# Patient Record
Sex: FEMALE | Race: WHITE | Employment: UNEMPLOYED | URBAN - METROPOLITAN AREA
[De-identification: names, ages, dates, MRNs, and addresses within clinical notes are randomized per-mention and may not be internally consistent; named-entity substitution may affect disease eponyms.]

---

## 2017-03-16 ENCOUNTER — TELEPHONE (OUTPATIENT)
Dept: FAMILY MEDICINE CLINIC | Age: 57
End: 2017-03-16

## 2017-08-18 RX ORDER — VALSARTAN 320 MG/1
TABLET ORAL
Qty: 90 TABLET | Refills: 0 | Status: SHIPPED | OUTPATIENT
Start: 2017-08-18 | End: 2017-10-26 | Stop reason: SDUPTHER

## 2017-08-18 RX ORDER — ROSUVASTATIN CALCIUM 10 MG/1
TABLET, COATED ORAL
Qty: 45 TABLET | Refills: 0 | Status: SHIPPED | OUTPATIENT
Start: 2017-08-18 | End: 2017-10-26 | Stop reason: SDUPTHER

## 2017-10-13 ENCOUNTER — TELEPHONE (OUTPATIENT)
Dept: FAMILY MEDICINE CLINIC | Age: 57
End: 2017-10-13

## 2017-10-25 NOTE — TELEPHONE ENCOUNTER
Patient called to request a 90 day prescriptions for    metFORMIN (GLUCOPHAGE) 1000 MG tablet [[id number]]    rosuvastatin (CRESTOR) 10 MG tablet [[id number]]     valsartan (DIOVAN) 320 MG tablet [[id number]]     Insulin Pen Needle (B-D ULTRAFINE III SHORT PEN) 31G X 8 MM MISC [[id number]]     She needs this sent in to [address] as soon as possible. She had an appointment scheduled with Dr. Steve Cope on 10/12/17 which due to his schedule change was canceled and rescheduled for 11/15/17. She will be out of these medications soon because of that.

## 2017-10-26 RX ORDER — ROSUVASTATIN CALCIUM 10 MG/1
TABLET, COATED ORAL
Qty: 45 TABLET | Refills: 0 | Status: SHIPPED | OUTPATIENT
Start: 2017-10-26 | End: 2018-01-24 | Stop reason: SDUPTHER

## 2017-10-26 RX ORDER — VALSARTAN 320 MG/1
TABLET ORAL
Qty: 90 TABLET | Refills: 0 | Status: SHIPPED | OUTPATIENT
Start: 2017-10-26 | End: 2018-01-24 | Stop reason: SDUPTHER

## 2017-12-26 ENCOUNTER — TELEPHONE (OUTPATIENT)
Dept: FAMILY MEDICINE CLINIC | Age: 57
End: 2017-12-26

## 2017-12-26 NOTE — TELEPHONE ENCOUNTER
Patient is needing to know if we have any samples of Lantus Solostar to hold her over for three weeks because insurance will not pay again until January 2018. Please call [phone] to advise. Can speak to patient's mother, Yazmin Walker. Patient is at work and is unable to speak on the phone.

## 2018-02-17 ENCOUNTER — HOSPITAL ENCOUNTER (OUTPATIENT)
Dept: MAMMOGRAPHY | Age: 58
Discharge: HOME | End: 2018-02-17
Attending: FAMILY MEDICINE | Admitting: FAMILY MEDICINE

## 2018-02-17 DIAGNOSIS — Z12.31 ENCOUNTER FOR SCREENING MAMMOGRAM FOR BREAST CANCER: ICD-10-CM

## 2018-02-23 ENCOUNTER — PRE-PROCEDURE TELEPHONE (OUTPATIENT)
Dept: CASE MANAGEMENT | Age: 58
End: 2018-02-23

## 2018-02-23 ENCOUNTER — HOSPITAL ENCOUNTER (OUTPATIENT)
Dept: MAMMOGRAPHY | Age: 58
Discharge: HOME | End: 2018-02-23
Attending: RADIOLOGY | Admitting: RADIOLOGY

## 2018-02-23 DIAGNOSIS — R92.8 ABNORMAL MAMMOGRAM: ICD-10-CM

## 2018-02-23 DIAGNOSIS — R92.0 ABNORMAL FINDING ON MAMMOGRAPHY, MICROCALCIFICATION: ICD-10-CM

## 2018-02-24 DIAGNOSIS — N63.20 LEFT BREAST MASS: ICD-10-CM

## 2018-02-26 ENCOUNTER — TELEPHONE (OUTPATIENT)
Dept: FAMILY MEDICINE CLINIC | Age: 58
End: 2018-02-26

## 2018-02-26 DIAGNOSIS — N63.20 MASS OF BREAST, LEFT: Primary | ICD-10-CM

## 2018-03-01 ENCOUNTER — TELEPHONE (OUTPATIENT)
Dept: FAMILY MEDICINE CLINIC | Age: 58
End: 2018-03-01

## 2018-03-01 ENCOUNTER — HOSPITAL ENCOUNTER (OUTPATIENT)
Dept: INTERVENTIONAL RADIOLOGY/VASCULAR | Age: 58
Discharge: HOME | End: 2018-03-01
Attending: FAMILY MEDICINE | Admitting: FAMILY MEDICINE

## 2018-03-01 ENCOUNTER — HOSPITAL ENCOUNTER (OUTPATIENT)
Dept: ULTRASOUND IMAGING | Age: 58
Discharge: HOME | End: 2018-02-24

## 2018-03-01 DIAGNOSIS — R92.0 ABNORMAL FINDING ON MAMMOGRAPHY, MICROCALCIFICATION: ICD-10-CM

## 2018-03-01 DIAGNOSIS — D05.12 DUCTAL CARCINOMA IN SITU (DCIS) OF LEFT BREAST: ICD-10-CM

## 2018-03-01 DIAGNOSIS — N63.20 LEFT BREAST MASS: ICD-10-CM

## 2018-03-01 RX ORDER — BACITRACIN ZINC AND POLYMYXIN B SULFATE 500; 1000 [USP'U]/G; [USP'U]/G
OINTMENT TOPICAL ONCE
Status: COMPLETED | OUTPATIENT
Start: 2018-03-01 | End: 2018-03-01

## 2018-03-01 RX ORDER — LIDOCAINE HYDROCHLORIDE 10 MG/ML
5 INJECTION, SOLUTION EPIDURAL; INFILTRATION; INTRACAUDAL; PERINEURAL ONCE
Status: COMPLETED | OUTPATIENT
Start: 2018-03-01 | End: 2018-03-01

## 2018-03-01 RX ADMIN — LIDOCAINE HYDROCHLORIDE 5 ML: 10 INJECTION, SOLUTION EPIDURAL; INFILTRATION; INTRACAUDAL; PERINEURAL at 13:10

## 2018-03-01 RX ADMIN — BACITRACIN ZINC AND POLYMYXIN B SULFATE: 500; 1000 OINTMENT TOPICAL at 13:40

## 2018-03-01 ASSESSMENT — PAIN SCALES - GENERAL: PAINLEVEL_OUTOF10: 3

## 2018-03-01 NOTE — PROGRESS NOTES
If it continues, or your breast is enlarging or becoming hard on the inside, you may have bleeding inside the breast. You should continue to hold pressure and go to the emergency department. 11. Watch for signs of infection, swelling, pain, fever, tenderness, heat or redness around the biopsy site. Call a  Nurse Navigator, Nirav Cunningham or Isa Bowie at [phone] for questions. 12. If you have a problem that cant wait until the next business day, call the Radiology Department at [phone] and ask to speak to the radiologist.  13. The final results of your biopsy are usually available in two to three working days. The results of your biopsy will be sent to your referring physician and either they or the nurse navigator will call you. Patient verbalized understanding and was given a copy of these instructions.

## 2018-03-01 NOTE — TELEPHONE ENCOUNTER
----- Message from Kristina Owens MD sent at 2/25/2018  5:17 PM EST -----  This was forwarded to me; looks like CM ordered stereotactic, but I don't see US guided bx order. Not sure if that was needed still? Just wanted to make sure that Gwenevere Phalen was taken care of. Please copy note in chart message that she is all set or let me know if she is not. Thanks!  ----- Message -----  From: Lakesha Rogers MD  Sent: 2/24/2018   5:12 PM  To: Kristina Owens MD    See note  ----- Message -----  From: Alcira Hurtado RN  Sent: 2/23/2018   5:35 PM  To: Kristina Owesn MD, Lakesha Rogers MD    Hi Dr. Dyana Brennan,   I don't believe you have met Gwenevere Phalen yet but I think she is going to be under your care, following Dr. Green Net assisted. She also needs an order both both a left stereotactic and left ultrasound guided biopsy put in EPIC. She is on the schedule for both procedures on Thursday. Thank you.   Aicha Kate

## 2018-03-05 PROBLEM — D05.12 DUCTAL CARCINOMA IN SITU (DCIS) OF LEFT BREAST: Status: ACTIVE | Noted: 2018-03-05

## 2018-03-05 PROBLEM — N63.20 LEFT BREAST MASS: Status: RESOLVED | Noted: 2018-02-24 | Resolved: 2018-03-05

## 2018-03-06 ENCOUNTER — OFFICE VISIT (OUTPATIENT)
Dept: FAMILY MEDICINE CLINIC | Age: 58
End: 2018-03-06

## 2018-03-06 VITALS
SYSTOLIC BLOOD PRESSURE: 170 MMHG | DIASTOLIC BLOOD PRESSURE: 90 MMHG | HEART RATE: 85 BPM | WEIGHT: 155 LBS | HEIGHT: 62 IN | BODY MASS INDEX: 28.52 KG/M2 | OXYGEN SATURATION: 97 %

## 2018-03-06 DIAGNOSIS — I10 ESSENTIAL HYPERTENSION: ICD-10-CM

## 2018-03-06 DIAGNOSIS — E78.5 HYPERLIPIDEMIA, UNSPECIFIED HYPERLIPIDEMIA TYPE: ICD-10-CM

## 2018-03-06 DIAGNOSIS — D05.12 DUCTAL CARCINOMA IN SITU (DCIS) OF LEFT BREAST: ICD-10-CM

## 2018-03-06 PROCEDURE — 99214 OFFICE O/P EST MOD 30 MIN: CPT | Performed by: FAMILY MEDICINE

## 2018-03-06 NOTE — PROGRESS NOTES
Landon Rivers  : 1960  Encounter date: 3/6/2018    This is a 61 y.o. female who presents for a chronic condition visit. Chief Complaint   Patient presents with    3 Month Follow-Up     Has reached out to HR already with concerns for [facility] paperwork. Has been very stressful and hectic with getting diagnosed with breast cancer last night. Trying to deal with this. Father passed away in September. Didn't get bloodwork in midst of all this. Just forgot. Diabetes Mellitus Type 2: Current symptoms/problems include none. Stress at work plays into sugar control she feels. Medication compliance:  compliant most of the time  Diabetic diet compliance:  noncompliant: not always compliant; likes sweets,  Weight trend: stable  Current exercise: walks 5 time(s) per week  Barriers: lack of motivation    Home blood sugar records: patient does not test  Any episodes of hypoglycemia? no  Eye exam current (within one year): unknown   reports that she has never smoked. She has never used smokeless tobacco.   Daily Aspirin? Yes    Hypertension:  Home blood pressure monitoring: No.  She is not adherent to a low sodium diet. Patient denies chest pain, shortness of breath, headache and peripheral edema. Antihypertensive medication side effects: no medication side effects noted. Use of agents associated with hypertension: none. Hyperlipidemia:  No new myalgias or GI upset on rosuvastatin (Crestor).        A1C:  Lab Results   Component Value Date    LABA1C 10.0 03/10/2017    LABA1C 10.7 2015     Micro:   Lab Results   Component Value Date    LABMICR 49.7 03/10/2017    CREATININE 0.71 03/10/2017     CBC:   Lab Results   Component Value Date    WBC 5.7 03/10/2017    HGB 13.7 03/10/2017    HCT 40.7 03/10/2017    MCH 30.0 03/10/2017    MCHC 33.7 03/10/2017    RDW 13.6 03/10/2017     03/10/2017     CMP:   Lab Results   Component Value Date     03/10/2017    K 4.2 03/10/2017    CL 99 03/10/2017 tablet Take 20 mg by mouth daily.  Omega 3-6-9 Fatty Acids (OMEGA-3 & OMEGA-6 FISH OIL PO) Take  by mouth daily. Review of Systems      Objective:    BP (!) 170/90   Pulse 85   Ht 5' 2\" (1.575 m)   Wt 155 lb (70.3 kg)   SpO2 97%   BMI 28.35 kg/m²   Weight: 155 lb (70.3 kg)     BP Readings from Last 3 Encounters:   03/06/18 (!) 170/90   11/15/17 (!) 158/96   03/16/17 134/80     Wt Readings from Last 3 Encounters:   03/06/18 155 lb (70.3 kg)   11/15/17 154 lb (69.9 kg)   03/16/17 154 lb (69.9 kg)       Physical Exam    Assessment/Plan:  Health Maintenance Due   Topic Date Due    Hepatitis C screen  1960    Diabetic retinal exam  12/29/1969    HIV screen  12/29/1974    DTaP/Tdap/Td vaccine (1 - Tdap) 12/29/1978    Pneumococcal med risk (1 of 1 - PPSV23) 12/29/1978    Cervical cancer screen  12/29/1980    Colon cancer screen colonoscopy  12/29/2009    Shingles Vaccine (1 of 2 - 2 Dose Series) 01/19/2010    Diabetic foot exam  07/10/2014    A1C test (Diabetic or Prediabetic)  06/10/2017    Diabetic microalbuminuria test  03/10/2018    Lipid screen  03/10/2018    Potassium monitoring  03/10/2018    Creatinine monitoring  03/10/2018     Health Maintenance reviewed     Goals Addressed     None          1. Uncontrolled type 2 diabetes mellitus without complication, with long-term current use of insulin ([facility])  Complete blood work. Further changes to medications may be made pending these results. - insulin glargine (LANTUS SOLOSTAR) 100 UNIT/ML injection pen; INJECT 47 UNITS UNDER THE SKIN EVERY NIGHT  Dispense: 10 pen; Refill: 5  - GLYCOSYLATED HGB  - Lipid Panel; Future  - Albumin, Random Urine; Future  - Comprehensive Metabolic Panel; Future  - CBC with Differential; Future  -  DIABETES FOOT EXAM    2. Essential hypertension  Check daily at home and report numbers back to me in 1-2 weeks' time. - CBC with Differential; Future    3.  Hyperlipidemia, unspecified hyperlipidemia type  -

## 2018-03-07 ENCOUNTER — TELEPHONE (OUTPATIENT)
Dept: FAMILY MEDICINE CLINIC | Age: 58
End: 2018-03-07

## 2018-03-08 ENCOUNTER — INITIAL CONSULT (OUTPATIENT)
Dept: SURGERY | Age: 58
End: 2018-03-08

## 2018-03-08 VITALS
WEIGHT: 155 LBS | HEART RATE: 88 BPM | DIASTOLIC BLOOD PRESSURE: 80 MMHG | HEIGHT: 62 IN | BODY MASS INDEX: 28.52 KG/M2 | TEMPERATURE: 96.9 F | SYSTOLIC BLOOD PRESSURE: 150 MMHG

## 2018-03-08 DIAGNOSIS — C50.912 PRIMARY BREAST MALIGNANCY, LEFT (HCC): Primary | ICD-10-CM

## 2018-03-08 PROCEDURE — 99245 OFF/OP CONSLTJ NEW/EST HI 55: CPT | Performed by: SURGERY

## 2018-03-08 RX ORDER — ACETAMINOPHEN 160 MG
TABLET,DISINTEGRATING ORAL DAILY
COMMUNITY

## 2018-03-08 ASSESSMENT — ENCOUNTER SYMPTOMS
EYES NEGATIVE: 1
RESPIRATORY NEGATIVE: 1
GASTROINTESTINAL NEGATIVE: 1

## 2018-03-08 NOTE — PROGRESS NOTES
(ASR)  disclaimer: The use of one or more reagents in the above tests is  regulated as an analyte specific reagent. These tests were developed and  their performance characteristics determined by the [facility]  of [facility]. They have not been cleared by the [employer] and Drug  Administration. The FDA has determined that such clearance or approval is  not necessary.      ELIU/ELIU        Department of Pathology  FINAL SURGICAL PATHOLOGY REPORT  Patient Name: Dena Marina        Accession No:  [id number]   Age Sex:   1960    57 Y / F       Location:      [facility]  Account No:   [de-identified]                 Collected:     2018  Med Rec No:    [id number]                 Received:      2018  Attend Phys:   Victoria Shaw MD         Completed:     2018  Perform Phys: Victoria Shaw MD            FINAL DIAGNOSIS:    Breast, left, biopsy (stereotactic):     - Ductal carcinoma in situ - see comment. COMMENT:  SYNOPTIC REPORT - NEEDLE CORE BIOPSIES, BREAST  Location: left breast  Invasive carcinoma: not identified  Ductal carcinoma in-situ: present      Note: p63 immunohistochemical staining highlights foci of DCIS, while  display an absence of myoepithelial cells in foci of invasive carcinoma. Number of cores involved/total cores: 2 of approximately 6 tissue  fragments involved by DCIS  Nuclear grade: high  Necrosis: present  Architectural pattern: comedo, cribriform  Microcalcifications: present  Ancillary studies  Immunohistochemical staining was performed on the concurrently diagnosed  invasive carcinoma (SFS-).   Studies have not been repeated on the  current case, but are available upon clinical request.  Ethyl Rasp    Past Medical History:   Diagnosis Date    Allergic rhinitis, cause unspecified     Diaphragmatic hernia without mention of obstruction or gangrene     Generalized osteoarthrosis, involving multiple sites     Pure contour. There were no skin changes of the breast or nipple areolar complex. There was no nipple inversion or discharge. Left: There were no new masses or changes in breast contour. There was minimal biopsy related ecchymosis. There were no additional skin changes of the breast or nipple areolar complex. There was no nipple inversion or discharge. There is no axillary lymphadenopathy palpated bilaterally. Head: Normocephalic and atraumatic. Eyes: EOM are normal. Pupils are equal, round, and reactive to light. Neck: Neck supple. No tracheal deviation present. Cardiovascular: regular rate. Extremities appear well perfused. Pulmonary: respirations are non-labored and without audible distress  Lymphatics: no palpable axillary or cervical lymphadenopathy. Skin: No rash noted. No erythema. Neurologic: alert and oriented. Assessment/Plan:  [id number] STAGE:  IA left multicentric breast cancer  ER + UT- HER2 Pending. I have reviewed the results of her most recent breast imaging and pathology with her. The surgical rational and technical details of undergoing breast conservation therapy versus a mastectomy were reviewed. She understood that patients who undergo breast conservation therapy, systemic therapy, and radiotherapy have comparable local recurrences and overall survival to those patients undergoing a mastectomy. Unfortunately due to the multifocal and multicentric nature of the disease, a mastectomy was recommended. We discussed the surgical technique of a mastectomy. We discussed that although we remove the breast in this procedure, there is still a risk of recurrent disease. We reviewed hospitalization and the need for surgical drains. We discussed additional risk and benefits of surgery which include but are not limited to anesthesia related risks, bleeding, infection (<4%), wound complications and unappealing cosmetics. We discussed the risk of contralateral breast cancer.  We also discussed the possibility of future recurrences. We reviewed expectations for recovery. We discussed a sentinel lymph node biopsy in great detail. I described the procedure of both an injection of a radioisotope as well as blue dye with migration to the axilla. I discussed the side effects including discoloration of the urine, stool, and breast as well as the possibility that the axilla would not map. We discussed the possibility of a false negative result and the potential need for further surgery. We reviewed the 3-5% risk of lymphedema. We discussed additional risks such as permanent arm numbness, the potential for nerve injury, and the possibility of a false negative finding. I discussed the fact that if we identified positive nodes, she may require a completion lymph node dissection. I reviewed the details of a level I and II ALND procedure as well as its risk of lymphedema as it compares to SLNB. Systemic therapy including chemotherapy and endocrine therapy were reviewed. I have recommended she undergo a consultation with medical oncology to discuss these treatments. She understands that the results of her HER2 testing may influence systemic recommendations. We discussed that radiation therapy is traditionally given to patients undergoing breast conservation therapy to reduce the risk of local recurrence. I have recommended she undergo a postoperative consultation with radiation oncology to discuss this treatment. Fertility does not apply. The role of genetic consultation was discussed. This will be deferred. Reconstructive options in patients undergoing breast conservation versus a mastectomy were reviewed. We will make her a referral.    Anticipated clinic follow up and survivorship were discussed. The potential need for long term central IV access was briefly reviewed. Self breast evaluation was reviewed.     We will plan a left mastectomy with sentinel lymph node biopsy and

## 2018-03-09 ENCOUNTER — TELEPHONE (OUTPATIENT)
Dept: SURGERY | Age: 58
End: 2018-03-09

## 2018-03-14 ENCOUNTER — TELEPHONE (OUTPATIENT)
Dept: FAMILY MEDICINE CLINIC | Age: 58
End: 2018-03-14

## 2018-03-14 ENCOUNTER — SURG/PROC ORDERS (OUTPATIENT)
Dept: SURGERY | Age: 58
End: 2018-03-14

## 2018-03-14 RX ORDER — SODIUM CHLORIDE, SODIUM LACTATE, POTASSIUM CHLORIDE, CALCIUM CHLORIDE 600; 310; 30; 20 MG/100ML; MG/100ML; MG/100ML; MG/100ML
INJECTION, SOLUTION INTRAVENOUS CONTINUOUS
Status: CANCELLED | OUTPATIENT
Start: 2018-03-14

## 2018-03-14 RX ORDER — LIDOCAINE HYDROCHLORIDE 10 MG/ML
0.1 INJECTION, SOLUTION EPIDURAL; INFILTRATION; INTRACAUDAL; PERINEURAL
Status: CANCELLED | OUTPATIENT
Start: 2018-03-14 | End: 2018-03-14

## 2018-03-14 RX ORDER — SODIUM CHLORIDE 0.9 % (FLUSH) 0.9 %
10 SYRINGE (ML) INJECTION EVERY 12 HOURS SCHEDULED
Status: CANCELLED | OUTPATIENT
Start: 2018-03-14

## 2018-03-14 RX ORDER — SODIUM CHLORIDE 0.9 % (FLUSH) 0.9 %
10 SYRINGE (ML) INJECTION PRN
Status: CANCELLED | OUTPATIENT
Start: 2018-03-14

## 2018-03-15 ENCOUNTER — TELEPHONE (OUTPATIENT)
Dept: SURGERY | Age: 58
End: 2018-03-15

## 2018-03-20 ENCOUNTER — TELEPHONE (OUTPATIENT)
Dept: SURGERY | Age: 58
End: 2018-03-20

## 2018-03-21 ENCOUNTER — TELEPHONE (OUTPATIENT)
Dept: SURGERY | Age: 58
End: 2018-03-21

## 2018-03-21 DIAGNOSIS — Z17.0 MALIGNANT NEOPLASM OF LEFT BREAST IN FEMALE, ESTROGEN RECEPTOR POSITIVE, UNSPECIFIED SITE OF BREAST (HCC): Primary | ICD-10-CM

## 2018-03-21 DIAGNOSIS — C50.912 MALIGNANT NEOPLASM OF LEFT BREAST IN FEMALE, ESTROGEN RECEPTOR POSITIVE, UNSPECIFIED SITE OF BREAST (HCC): Primary | ICD-10-CM

## 2018-03-21 NOTE — TELEPHONE ENCOUNTER
Patient needs a release note to return to work. Patient is returning to work on Monday, March 26th. Patient would like to pick this note up Thursday or Friday. Please call patient when ready to be picked up.

## 2018-03-22 ENCOUNTER — NURSE ONLY (OUTPATIENT)
Dept: FAMILY MEDICINE CLINIC | Age: 58
End: 2018-03-22

## 2018-03-22 NOTE — PROGRESS NOTES
Patient came in office today to learn how to use her blood glucose monitor. Instructed patient how to use monitor to check her glucose level on her own device. Glucose level was 200, patient had just eaten before she came in. Patient had no further questions, and verbalized she understood how to check her glucose with her monitor.

## 2018-04-10 ENCOUNTER — OFFICE VISIT (OUTPATIENT)
Dept: FAMILY MEDICINE CLINIC | Age: 58
End: 2018-04-10

## 2018-04-10 ENCOUNTER — TELEPHONE (OUTPATIENT)
Dept: FAMILY MEDICINE CLINIC | Age: 58
End: 2018-04-10

## 2018-04-10 VITALS
HEIGHT: 62 IN | SYSTOLIC BLOOD PRESSURE: 148 MMHG | DIASTOLIC BLOOD PRESSURE: 82 MMHG | OXYGEN SATURATION: 98 % | WEIGHT: 154 LBS | BODY MASS INDEX: 28.34 KG/M2 | TEMPERATURE: 97.8 F | HEART RATE: 91 BPM

## 2018-04-10 DIAGNOSIS — D05.12 DUCTAL CARCINOMA IN SITU (DCIS) OF LEFT BREAST: ICD-10-CM

## 2018-04-10 DIAGNOSIS — Z01.818 PREOP EXAMINATION: Primary | ICD-10-CM

## 2018-04-10 DIAGNOSIS — E03.9 HYPOTHYROIDISM, UNSPECIFIED TYPE: ICD-10-CM

## 2018-04-10 DIAGNOSIS — I10 ESSENTIAL HYPERTENSION: ICD-10-CM

## 2018-04-10 PROCEDURE — 93000 ELECTROCARDIOGRAM COMPLETE: CPT | Performed by: FAMILY MEDICINE

## 2018-04-10 PROCEDURE — 99243 OFF/OP CNSLTJ NEW/EST LOW 30: CPT | Performed by: FAMILY MEDICINE

## 2018-04-10 RX ORDER — AMLODIPINE BESYLATE 2.5 MG/1
2.5 TABLET ORAL DAILY
Qty: 30 TABLET | Refills: 2 | Status: SHIPPED | OUTPATIENT
Start: 2018-04-10 | End: 2018-07-09 | Stop reason: SDUPTHER

## 2018-04-11 ENCOUNTER — TELEPHONE (OUTPATIENT)
Dept: FAMILY MEDICINE CLINIC | Age: 58
End: 2018-04-11

## 2018-04-12 ENCOUNTER — TELEPHONE (OUTPATIENT)
Dept: SURGERY | Age: 58
End: 2018-04-12

## 2018-04-16 ENCOUNTER — TELEPHONE (OUTPATIENT)
Dept: SURGERY | Age: 58
End: 2018-04-16

## 2018-04-18 ENCOUNTER — HOSPITAL ENCOUNTER (OUTPATIENT)
Dept: NUCLEAR MEDICINE | Age: 58
Discharge: HOME | End: 2018-04-12
Attending: SURGERY | Admitting: SURGERY

## 2018-04-18 DIAGNOSIS — C50.912 MALIGNANT NEOPLASM OF LEFT FEMALE BREAST, UNSPECIFIED ESTROGEN RECEPTOR STATUS, UNSPECIFIED SITE OF BREAST (HCC): ICD-10-CM

## 2018-04-18 DIAGNOSIS — C50.912 MALIGNANT NEOPLASM OF LEFT FEMALE BREAST (HCC): ICD-10-CM

## 2018-04-26 ENCOUNTER — TELEPHONE (OUTPATIENT)
Dept: SURGERY | Age: 58
End: 2018-04-26

## 2018-05-04 ENCOUNTER — OFFICE VISIT (OUTPATIENT)
Dept: SURGERY | Age: 58
End: 2018-05-04

## 2018-05-04 VITALS
TEMPERATURE: 97.3 F | WEIGHT: 155 LBS | DIASTOLIC BLOOD PRESSURE: 85 MMHG | BODY MASS INDEX: 27.46 KG/M2 | HEIGHT: 63 IN | SYSTOLIC BLOOD PRESSURE: 165 MMHG | HEART RATE: 96 BPM

## 2018-05-04 DIAGNOSIS — C50.912 MALIGNANT NEOPLASM OF LEFT BREAST IN FEMALE, ESTROGEN RECEPTOR POSITIVE, UNSPECIFIED SITE OF BREAST (HCC): Primary | ICD-10-CM

## 2018-05-04 DIAGNOSIS — Z17.0 MALIGNANT NEOPLASM OF LEFT BREAST IN FEMALE, ESTROGEN RECEPTOR POSITIVE, UNSPECIFIED SITE OF BREAST (HCC): Primary | ICD-10-CM

## 2018-05-04 PROCEDURE — 99024 POSTOP FOLLOW-UP VISIT: CPT | Performed by: SURGERY

## 2018-05-04 RX ORDER — CIPROFLOXACIN 500 MG/1
TABLET, FILM COATED ORAL
COMMUNITY
Start: 2018-04-03 | End: 2018-10-26 | Stop reason: ALTCHOICE

## 2018-05-08 ENCOUNTER — TELEPHONE (OUTPATIENT)
Dept: FAMILY MEDICINE CLINIC | Age: 58
End: 2018-05-08

## 2018-05-09 ENCOUNTER — TELEPHONE (OUTPATIENT)
Dept: SURGERY | Age: 58
End: 2018-05-09

## 2018-05-09 ENCOUNTER — TELEPHONE (OUTPATIENT)
Dept: ADMINISTRATIVE | Age: 58
End: 2018-05-09

## 2018-05-15 ENCOUNTER — TELEPHONE (OUTPATIENT)
Dept: SURGERY | Age: 58
End: 2018-05-15

## 2018-06-01 ENCOUNTER — TELEPHONE (OUTPATIENT)
Dept: FAMILY MEDICINE CLINIC | Age: 58
End: 2018-06-01

## 2018-06-04 ENCOUNTER — TELEPHONE (OUTPATIENT)
Dept: FAMILY MEDICINE CLINIC | Age: 58
End: 2018-06-04

## 2018-06-12 ENCOUNTER — TELEPHONE (OUTPATIENT)
Dept: FAMILY MEDICINE CLINIC | Age: 58
End: 2018-06-12

## 2018-06-12 ENCOUNTER — NURSE ONLY (OUTPATIENT)
Dept: FAMILY MEDICINE CLINIC | Age: 58
End: 2018-06-12

## 2018-06-12 VITALS — SYSTOLIC BLOOD PRESSURE: 127 MMHG | DIASTOLIC BLOOD PRESSURE: 75 MMHG | HEART RATE: 84 BPM

## 2018-06-20 ENCOUNTER — HOSPITAL ENCOUNTER (OUTPATIENT)
Dept: GENERAL RADIOLOGY | Age: 58
Discharge: HOME | End: 2018-06-20
Attending: INTERNAL MEDICINE | Admitting: INTERNAL MEDICINE

## 2018-06-20 DIAGNOSIS — Z85.00 PERSONAL HISTORY OF MALIGNANT NEOPLASM OF GASTROINTESTINAL TRACT: ICD-10-CM

## 2018-06-20 DIAGNOSIS — M81.0 AGE-RELATED OSTEOPOROSIS WITHOUT CURRENT PATHOLOGICAL FRACTURE: ICD-10-CM

## 2018-07-09 ENCOUNTER — TELEPHONE (OUTPATIENT)
Dept: FAMILY MEDICINE CLINIC | Age: 58
End: 2018-07-09

## 2018-07-09 DIAGNOSIS — I10 ESSENTIAL HYPERTENSION: ICD-10-CM

## 2018-07-09 RX ORDER — AMLODIPINE BESYLATE 2.5 MG/1
2.5 TABLET ORAL DAILY
Qty: 90 TABLET | Refills: 0 | Status: SHIPPED | OUTPATIENT
Start: 2018-07-09 | End: 2018-07-10 | Stop reason: SDUPTHER

## 2018-07-10 RX ORDER — AMLODIPINE BESYLATE 2.5 MG/1
2.5 TABLET ORAL DAILY
Qty: 7 TABLET | Refills: 0 | Status: SHIPPED | OUTPATIENT
Start: 2018-07-10 | End: 2018-09-20 | Stop reason: SDUPTHER

## 2018-07-18 ENCOUNTER — TELEPHONE (OUTPATIENT)
Dept: RHEUMATOLOGY | Age: 58
End: 2018-07-18

## 2018-07-20 ENCOUNTER — TELEPHONE (OUTPATIENT)
Dept: FAMILY MEDICINE CLINIC | Age: 58
End: 2018-07-20

## 2018-07-20 RX ORDER — LOSARTAN POTASSIUM 100 MG/1
100 TABLET ORAL DAILY
Qty: 90 TABLET | Refills: 0 | Status: SHIPPED | OUTPATIENT
Start: 2018-07-20 | End: 2018-10-01 | Stop reason: SDUPTHER

## 2018-07-20 NOTE — TELEPHONE ENCOUNTER
See how much she has left? If she is going to run out in next couple of months, then we can try losartan 100mg daily. I would recommend we send in 30 w 2 refills and have her monitor at home.

## 2018-09-20 DIAGNOSIS — I10 ESSENTIAL HYPERTENSION: ICD-10-CM

## 2018-09-20 RX ORDER — SITAGLIPTIN 100 MG/1
TABLET, FILM COATED ORAL
Qty: 90 TABLET | Refills: 0 | Status: SHIPPED | OUTPATIENT
Start: 2018-09-20 | End: 2018-12-20 | Stop reason: SDUPTHER

## 2018-09-20 RX ORDER — AMLODIPINE BESYLATE 2.5 MG/1
TABLET ORAL
Qty: 90 TABLET | Refills: 0 | Status: ON HOLD | OUTPATIENT
Start: 2018-09-20 | End: 2018-12-19 | Stop reason: SDUPTHER

## 2018-10-01 RX ORDER — LOSARTAN POTASSIUM 100 MG/1
TABLET ORAL
Qty: 90 TABLET | Refills: 0 | Status: SHIPPED | OUTPATIENT
Start: 2018-10-01 | End: 2018-12-06

## 2018-10-03 ENCOUNTER — TELEPHONE (OUTPATIENT)
Dept: SURGERY | Age: 58
End: 2018-10-03

## 2018-10-26 ENCOUNTER — OFFICE VISIT (OUTPATIENT)
Dept: SURGERY | Age: 58
End: 2018-10-26
Payer: COMMERCIAL

## 2018-10-26 VITALS
TEMPERATURE: 96.8 F | SYSTOLIC BLOOD PRESSURE: 132 MMHG | HEIGHT: 63 IN | DIASTOLIC BLOOD PRESSURE: 76 MMHG | HEART RATE: 93 BPM | BODY MASS INDEX: 26.97 KG/M2 | WEIGHT: 152.2 LBS

## 2018-10-26 DIAGNOSIS — Z85.3 PERSONAL HISTORY OF BREAST CANCER: Primary | ICD-10-CM

## 2018-10-26 DIAGNOSIS — Z12.31 SCREENING MAMMOGRAM, ENCOUNTER FOR: ICD-10-CM

## 2018-10-26 PROCEDURE — 99213 OFFICE O/P EST LOW 20 MIN: CPT | Performed by: SURGERY

## 2018-10-26 RX ORDER — VALSARTAN 320 MG/1
320 TABLET ORAL NIGHTLY
COMMUNITY
End: 2019-07-26 | Stop reason: SDUPTHER

## 2018-10-26 NOTE — PROGRESS NOTES
PCP:  Medical Oncology: [facility]  Radiation:  Other: Elina Poster        pT1cN0  STAGE:  IA left breast cancer      Ms. Adelina Dixon is a 61y.o.-year-old woman who initially presented to me with  left breast cancer. Since her postoperative visit Ms. Chopra has been doing quite well. She did unfortunately have to have her expander removed due to an infection. She is interested in other reconstructive options. She has no new complaints today. INTERVAL HISTORY:    On 4/18/2018 she underwent a left mastectomy with sentinel lymph node biopsy. Pathology identified 1.3 cm of grade 2 invasive ductal carcinoma with grade 3 DCIS. ER positive TX negative HER-2 negative (repeat testing). Margins were negative. There were 0/4 lymph nodes involved carcinoma. Exam:  General: no acute distress  Breast:  The patient was examined in the upright and supine position. There is a well healed scar on the  left breast. There is redundant tissue but no sign of infection. There are expected  post surgical changes. She has good range of motion with her arm. Her contralateral breast shows no new masses or changes in breast contour. There were no skin changes of the breast or nipple areolar complex. There was no nipple inversion or discharge. Respiratory: respirations are non-labored and there is no audible distress  Cardiovascular: regular rate, extremities appear well perfused  Neurologic: alert, oriented      Assessment/Plan:  pT1cN0  STAGE:  IA left breast cancer  ER positive TX negative HER 2 negative (repeat testing)  S/p left SS mastectomy with SLNB and TE recon  S/p left breast infection and removal of expander    Taking Arimidex    There are no current signs of recurrence. Signs/symptoms of recurrence were reviewed. She verbalizes understanding that she should notify our office if she identifies any abnormalities on self evaluation as it may require further workup.     I encouraged her to continue self breast

## 2018-10-27 ENCOUNTER — HOSPITAL ENCOUNTER (OUTPATIENT)
Dept: CT IMAGING | Age: 58
Discharge: HOME | End: 2018-10-27
Payer: COMMERCIAL

## 2018-10-27 ENCOUNTER — HOSPITAL ENCOUNTER (OUTPATIENT)
Dept: ULTRASOUND IMAGING | Age: 58
Discharge: HOME | End: 2018-10-27
Payer: COMMERCIAL

## 2018-10-27 DIAGNOSIS — C50.919 MALIGNANT NEOPLASM OF FEMALE BREAST, UNSPECIFIED ESTROGEN RECEPTOR STATUS, UNSPECIFIED LATERALITY, UNSPECIFIED SITE OF BREAST (HCC): ICD-10-CM

## 2018-10-27 DIAGNOSIS — R14.0 ABDOMINAL BLOATING: ICD-10-CM

## 2018-10-27 LAB
BUN BLDV-MCNC: 5 MG/DL (ref 7–20)
CREAT SERPL-MCNC: 0.6 MG/DL (ref 0.6–1.1)
GFR AFRICAN AMERICAN: >60
GFR NON-AFRICAN AMERICAN: >60

## 2018-10-27 PROCEDURE — 76700 US EXAM ABDOM COMPLETE: CPT

## 2018-10-27 PROCEDURE — 84520 ASSAY OF UREA NITROGEN: CPT

## 2018-10-27 PROCEDURE — 82565 ASSAY OF CREATININE: CPT

## 2018-10-27 PROCEDURE — 6360000004 HC RX CONTRAST MEDICATION: Performed by: INTERNAL MEDICINE

## 2018-10-27 PROCEDURE — 74177 CT ABD & PELVIS W/CONTRAST: CPT

## 2018-10-27 PROCEDURE — 36415 COLL VENOUS BLD VENIPUNCTURE: CPT

## 2018-10-27 RX ADMIN — IOPAMIDOL 75 ML: 755 INJECTION, SOLUTION INTRAVENOUS at 10:21

## 2018-10-27 RX ADMIN — IOHEXOL 50 ML: 240 INJECTION, SOLUTION INTRATHECAL; INTRAVASCULAR; INTRAVENOUS; ORAL at 10:22

## 2018-11-14 RX ORDER — ANASTROZOLE 1 MG/1
1 TABLET ORAL NIGHTLY
COMMUNITY

## 2018-11-26 ENCOUNTER — TELEPHONE (OUTPATIENT)
Dept: SURGERY | Age: 58
End: 2018-11-26

## 2018-11-27 ENCOUNTER — TELEPHONE (OUTPATIENT)
Dept: FAMILY MEDICINE CLINIC | Age: 58
End: 2018-11-27

## 2018-11-27 NOTE — TELEPHONE ENCOUNTER
Pt is calling to get advise for her colonoscopy that she is having on Friday. She would like to know if she needs to still take 60 units of her Lantus?

## 2018-11-27 NOTE — TELEPHONE ENCOUNTER
Patient's mother would like to know if patient can take her full dose insulin the night before her colonoscopy. Patient is scheduled for Thursday.  Please advise

## 2018-11-28 ENCOUNTER — TELEPHONE (OUTPATIENT)
Dept: SURGERY | Age: 58
End: 2018-11-28

## 2018-11-29 ENCOUNTER — ANESTHESIA (OUTPATIENT)
Dept: ENDOSCOPY | Age: 58
End: 2018-11-29
Payer: COMMERCIAL

## 2018-11-29 ENCOUNTER — HOSPITAL ENCOUNTER (OUTPATIENT)
Age: 58
Setting detail: OUTPATIENT SURGERY
Discharge: HOME | End: 2018-11-29
Attending: INTERNAL MEDICINE | Admitting: INTERNAL MEDICINE
Payer: COMMERCIAL

## 2018-11-29 ENCOUNTER — ANESTHESIA EVENT (OUTPATIENT)
Dept: ENDOSCOPY | Age: 58
End: 2018-11-29
Payer: COMMERCIAL

## 2018-11-29 VITALS
WEIGHT: 150 LBS | DIASTOLIC BLOOD PRESSURE: 55 MMHG | HEART RATE: 79 BPM | RESPIRATION RATE: 16 BRPM | OXYGEN SATURATION: 100 % | SYSTOLIC BLOOD PRESSURE: 122 MMHG | TEMPERATURE: 98 F | HEIGHT: 63 IN | BODY MASS INDEX: 26.58 KG/M2

## 2018-11-29 VITALS — DIASTOLIC BLOOD PRESSURE: 48 MMHG | OXYGEN SATURATION: 100 % | SYSTOLIC BLOOD PRESSURE: 99 MMHG

## 2018-11-29 LAB
GLUCOSE BLD-MCNC: 225 MG/DL (ref 70–99)
GLUCOSE BLD-MCNC: 250 MG/DL (ref 70–99)
PERFORMED ON: ABNORMAL
PERFORMED ON: ABNORMAL

## 2018-11-29 PROCEDURE — 3700000000 HC ANESTHESIA ATTENDED CARE: Performed by: INTERNAL MEDICINE

## 2018-11-29 PROCEDURE — 7100000010 HC PHASE II RECOVERY - FIRST 15 MIN: Performed by: INTERNAL MEDICINE

## 2018-11-29 PROCEDURE — 7100000011 HC PHASE II RECOVERY - ADDTL 15 MIN: Performed by: INTERNAL MEDICINE

## 2018-11-29 PROCEDURE — 6360000002 HC RX W HCPCS: Performed by: NURSE ANESTHETIST, CERTIFIED REGISTERED

## 2018-11-29 PROCEDURE — 2580000003 HC RX 258: Performed by: NURSE ANESTHETIST, CERTIFIED REGISTERED

## 2018-11-29 PROCEDURE — 2709999900 HC NON-CHARGEABLE SUPPLY: Performed by: INTERNAL MEDICINE

## 2018-11-29 PROCEDURE — 2580000003 HC RX 258: Performed by: INTERNAL MEDICINE

## 2018-11-29 PROCEDURE — 3700000001 HC ADD 15 MINUTES (ANESTHESIA): Performed by: INTERNAL MEDICINE

## 2018-11-29 PROCEDURE — 2500000003 HC RX 250 WO HCPCS: Performed by: NURSE ANESTHETIST, CERTIFIED REGISTERED

## 2018-11-29 PROCEDURE — 3609010600 HC COLONOSCOPY POLYPECTOMY SNARE/COLD BIOPSY: Performed by: INTERNAL MEDICINE

## 2018-11-29 RX ORDER — SODIUM CHLORIDE 9 MG/ML
INJECTION, SOLUTION INTRAVENOUS CONTINUOUS
Status: DISCONTINUED | OUTPATIENT
Start: 2018-11-29 | End: 2018-11-29 | Stop reason: HOSPADM

## 2018-11-29 RX ORDER — PROPOFOL 10 MG/ML
INJECTION, EMULSION INTRAVENOUS PRN
Status: DISCONTINUED | OUTPATIENT
Start: 2018-11-29 | End: 2018-11-29 | Stop reason: SDUPTHER

## 2018-11-29 RX ORDER — SODIUM CHLORIDE 9 MG/ML
INJECTION, SOLUTION INTRAVENOUS CONTINUOUS PRN
Status: DISCONTINUED | OUTPATIENT
Start: 2018-11-29 | End: 2018-11-29 | Stop reason: SDUPTHER

## 2018-11-29 RX ORDER — LIDOCAINE HYDROCHLORIDE 20 MG/ML
INJECTION, SOLUTION INFILTRATION; PERINEURAL PRN
Status: DISCONTINUED | OUTPATIENT
Start: 2018-11-29 | End: 2018-11-29 | Stop reason: SDUPTHER

## 2018-11-29 RX ADMIN — PROPOFOL 20 MG: 10 INJECTION, EMULSION INTRAVENOUS at 07:00

## 2018-11-29 RX ADMIN — PROPOFOL 20 MG: 10 INJECTION, EMULSION INTRAVENOUS at 07:23

## 2018-11-29 RX ADMIN — PROPOFOL 20 MG: 10 INJECTION, EMULSION INTRAVENOUS at 07:08

## 2018-11-29 RX ADMIN — PROPOFOL 30 MG: 10 INJECTION, EMULSION INTRAVENOUS at 07:02

## 2018-11-29 RX ADMIN — PROPOFOL 30 MG: 10 INJECTION, EMULSION INTRAVENOUS at 07:17

## 2018-11-29 RX ADMIN — PROPOFOL 20 MG: 10 INJECTION, EMULSION INTRAVENOUS at 07:10

## 2018-11-29 RX ADMIN — SODIUM CHLORIDE: 9 INJECTION, SOLUTION INTRAVENOUS at 06:27

## 2018-11-29 RX ADMIN — LIDOCAINE HYDROCHLORIDE 100 MG: 20 INJECTION, SOLUTION INFILTRATION; PERINEURAL at 07:00

## 2018-11-29 RX ADMIN — PROPOFOL 20 MG: 10 INJECTION, EMULSION INTRAVENOUS at 07:15

## 2018-11-29 RX ADMIN — PROPOFOL 20 MG: 10 INJECTION, EMULSION INTRAVENOUS at 07:19

## 2018-11-29 RX ADMIN — SODIUM CHLORIDE: 9 INJECTION, SOLUTION INTRAVENOUS at 06:55

## 2018-11-29 RX ADMIN — PROPOFOL 20 MG: 10 INJECTION, EMULSION INTRAVENOUS at 07:01

## 2018-11-29 RX ADMIN — PROPOFOL 30 MG: 10 INJECTION, EMULSION INTRAVENOUS at 07:03

## 2018-11-29 ASSESSMENT — PAIN SCALES - GENERAL: PAINLEVEL_OUTOF10: 0

## 2018-11-29 ASSESSMENT — PAIN - FUNCTIONAL ASSESSMENT: PAIN_FUNCTIONAL_ASSESSMENT: 0-10

## 2018-11-29 NOTE — PROGRESS NOTES
Reviewed patient's medical and surgical history in electronic record and with patient at the bedside. All questions regarding procedure answered. Scope number and equipment verified using a two person system. Family in waiting room.     Electronically signed by Vinh Shields RN on 11/29/2018 at 7:01 AM

## 2018-11-29 NOTE — ANESTHESIA POSTPROCEDURE EVALUATION
Department of Anesthesiology  Postprocedure Note    Patient: Annita Marie  MRN: [id number]  YOB: 1960  Date of evaluation: 11/29/2018  Time:  2:33 PM     Procedure Summary     Date:  11/29/18 Room / Location:  [facility] ENDO 03 / [facility] ENDOSCOPY    Anesthesia Start:  3845 Anesthesia Stop:  0732    Procedure:  COLONOSCOPY POLYPECTOMY SNARE/COLD BIOPSY (N/A ) Diagnosis:  (COLON CANCER SCREENING Z12.11)    Surgeon:  Ilana Davis MD Responsible Provider:  Nigel Ferguson MD    Anesthesia Type:  MAC ASA Status:  3          Anesthesia Type: MAC    Yola Phase I: Yola Score: 10    Yola Phase II: Yola Score: 10    Last vitals: Reviewed and per EMR flowsheets.        Anesthesia Post Evaluation    Patient location during evaluation: PACU  Complications: no  Cardiovascular status: hemodynamically stable  Respiratory status: acceptable

## 2018-11-29 NOTE — ANESTHESIA PRE PROCEDURE
Department of Anesthesiology  Preprocedure Note       Name:  Brittni Randall   Age:  61 y.o.  :  1960                                          MRN:  [id number]         Date:  2018      Surgeon: Yann Lowery):  Elvia Rapp MD    Procedure: COLONSCOPY (N/A )    Medications prior to admission:   Prior to Admission medications    Medication Sig Start Date End Date Taking? Authorizing Provider   Insulin Glargine (LANTUS SOLOSTAR SC) Inject 50 Units into the skin daily   Yes Historical Provider, MD   CALCIUM PO Take by mouth 2 times daily   Yes Historical Provider, MD   anastrozole (ARIMIDEX) 1 MG tablet Take 1 mg by mouth nightly   Yes Historical Provider, MD   valsartan (DIOVAN) 320 MG tablet Take 320 mg by mouth   Yes Historical Provider, MD   Breast Prosthesis MISC by Does not apply route Please dispense maximum amount of breast prosthesis and bras as insurance allows. Personal history of breast cancer- ICD-10-CM: Z85.3 10/26/18  Yes Desean Brooke MD   amLODIPine (NORVASC) 2.5 MG tablet TAKE 1 TABLET DAILY 18  Yes Tameka Castro MD   JANUVIA 100 MG tablet TAKE 1 TABLET DAILY 18  Yes Tameka Castro MD   metFORMIN (GLUCOPHAGE) 1000 MG tablet TAKE 1 TABLET TWICE A DAY WITH MEALS 18  Yes Tameka Castro MD   Simethicone 180 MG CAPS Take by mouth 2 times daily as needed   Yes Historical Provider, MD   Cholecalciferol (VITAMIN D3) 2000 units CAPS Take by mouth daily    Yes Historical Provider, MD   RaNITidine HCl (ZANTAC 150 MAXIMUM STRENGTH PO) Take 150 mg by mouth 2 times daily (before meals)   Yes Historical Provider, MD   B-D ULTRAFINE III SHORT PEN 31G X 8 MM MISC USE ONCE DAILY 18  Yes Tameka Castro MD   Probiotic Product (PROBIOTIC DAILY PO) Take  by mouth. Yes Historical Provider, MD   Glucose Blood (ACCU-CHEK ACTIVE VI) 1 strip by In Vitro route daily.    Yes Historical Provider, MD   Lancet Devices (SOFT TOUCH LANCET DEVICE) MISC by Does not apply

## 2018-12-06 ENCOUNTER — OFFICE VISIT (OUTPATIENT)
Dept: SURGERY | Age: 58
End: 2018-12-06
Payer: COMMERCIAL

## 2018-12-06 VITALS
HEIGHT: 63 IN | WEIGHT: 153 LBS | SYSTOLIC BLOOD PRESSURE: 121 MMHG | DIASTOLIC BLOOD PRESSURE: 65 MMHG | BODY MASS INDEX: 27.11 KG/M2

## 2018-12-06 DIAGNOSIS — K80.20 SYMPTOMATIC CHOLELITHIASIS: Primary | ICD-10-CM

## 2018-12-06 PROCEDURE — 99243 OFF/OP CNSLTJ NEW/EST LOW 30: CPT | Performed by: SURGERY

## 2018-12-06 ASSESSMENT — ENCOUNTER SYMPTOMS
ALLERGIC/IMMUNOLOGIC NEGATIVE: 1
EYES NEGATIVE: 1
GASTROINTESTINAL NEGATIVE: 1
RESPIRATORY NEGATIVE: 1

## 2018-12-06 NOTE — PROGRESS NOTES
Calcification of the abdominal aorta and iliac arteries. GI/Bowel: Fecal loading of the colon. Apparent thickening is seen of the   cecum and ascending colon, likely stool adjacent to intraluminal oral   contrast.  Loops of small and large bowel are nondilated. Appendix is within   normal limits in caliber. Pelvis: Bladder is grossly unremarkable. Uterus is present. No inguinal   adenopathy. Peritoneum/Retroperitoneum: No evidence of free air. Bones/Soft Tissues: Degenerative change of the spine. Impression   Cholelithiasis. Fatty liver. Small hiatal hernia with mural thickening. Correlate for signs or symptoms   of esophagitis. Atherosclerosis, including coronary artery calcification. 2 mm left basilar pulmonary nodule, which may be followed based upon clinical   risk factors. Apparent mural thickening of the cecum and right colon is likely due to stool   deposition. Colitis is felt less likely given the lack of inflammatory   stranding. Correlation with any recent colonoscopy is suggested to ensure   the absence of an underlying mural lesion. IMPRESSION/RECOMMENDATIONS:    Symptomatic cholelithiasis    The patient has symptomatic gallbladder disease for which I have recommended a laparoscopic cholecystectomy with cholangiogram.    The plan for surgery, risks, benefits, and alternatives have been reviewed with the patient. Handouts are reviewed and given to her today. Schedule as outpt at [facility].       Jorge Shields

## 2018-12-12 RX ORDER — ASCORBIC ACID 500 MG
500 TABLET ORAL DAILY
COMMUNITY

## 2018-12-12 NOTE — PROGRESS NOTES
piercing jewelry must be removed. 11. If you have ___dentures, they will be removed before going to the OR; we will provide you a container. If you wear ___contact lenses or ___glasses, they will be removed; please bring a case for them. 12. Please see your family doctor/pediatrician for a history & physical and/or concerning medications. Bring any test results/reports from your physician's office. PCP__________________Phone___________H&P Appt. Date________             13 If you  have a Living Will and Durable Power of  for Healthcare, please bring in a copy. 15. Notify your Surgeon if you develop any illness between now and surgery  time, cough, cold, fever, sore throat, nausea, vomiting, etc.  Please notify your surgeon if you experience dizziness, shortness of breath or blurred vision between now & the time of your surgery             15. DO NOT shave your operative site 96 hours prior to surgery. For face & neck surgery, men may use an electric razor 48 hours prior to surgery. 16. Shower the night before surgery with __X_Antibacterial soap ___Hibiclens             17. To provide excellent care visitors will be limited to one in the room at any given time. 18.  Please bring picture ID and insurance card. 19.  Visit our web site for additional information:  [email or web address]              20.During flu season no children under the age of 15 are permitted in the hospital for the safety of all patients. 21.X If you take a long acting insulin in the evening only  take half of your usual  dose the night  before your procedure              22. If you use a c-pap please bring DOS if staying overnight,             23.For your convenience [facility] has a pharmacy on site to fill your prescriptions.              24. If you use oxygen and have a portable tank please bring it  with you the DOS             25. Bring a complete list of all your medications with name and dose include any supplements. 26. Other__________________________________________   *Please call pre admission testing if you any further questions   [facility]   [address] [place] [address]  [phone]   [address] [place] [address]       All above information reviewed with patient in person or by phone. Patient verbalizes understanding. All questions and concerns addressed.                                                                                                  Patient/Rep__PATIENT________________                                                                                                                                    PRE OP INSTRUCTIONS

## 2018-12-14 ENCOUNTER — OFFICE VISIT (OUTPATIENT)
Dept: FAMILY MEDICINE CLINIC | Age: 58
End: 2018-12-14
Payer: COMMERCIAL

## 2018-12-14 VITALS
TEMPERATURE: 96.9 F | OXYGEN SATURATION: 97 % | HEART RATE: 87 BPM | WEIGHT: 145 LBS | DIASTOLIC BLOOD PRESSURE: 70 MMHG | SYSTOLIC BLOOD PRESSURE: 130 MMHG | BODY MASS INDEX: 25.69 KG/M2 | HEIGHT: 63 IN

## 2018-12-14 DIAGNOSIS — Z01.818 PREOP EXAMINATION: Primary | ICD-10-CM

## 2018-12-14 DIAGNOSIS — I10 ESSENTIAL HYPERTENSION: ICD-10-CM

## 2018-12-14 DIAGNOSIS — K80.20 GALLSTONES: ICD-10-CM

## 2018-12-14 PROCEDURE — 99243 OFF/OP CNSLTJ NEW/EST LOW 30: CPT | Performed by: PHYSICIAN ASSISTANT

## 2018-12-14 RX ORDER — DIPHENHYDRAMINE HCL 25 MG
25 CAPSULE ORAL EVERY 6 HOURS PRN
COMMUNITY
End: 2021-06-09

## 2018-12-14 ASSESSMENT — PATIENT HEALTH QUESTIONNAIRE - PHQ9
SUM OF ALL RESPONSES TO PHQ9 QUESTIONS 1 & 2: 0
2. FEELING DOWN, DEPRESSED OR HOPELESS: 0
SUM OF ALL RESPONSES TO PHQ QUESTIONS 1-9: 0
SUM OF ALL RESPONSES TO PHQ QUESTIONS 1-9: 0
1. LITTLE INTEREST OR PLEASURE IN DOING THINGS: 0

## 2018-12-15 ENCOUNTER — HOSPITAL ENCOUNTER (OUTPATIENT)
Age: 58
Discharge: HOME | End: 2018-12-15
Payer: COMMERCIAL

## 2018-12-15 LAB
A/G RATIO: 1.6 (ref 1.1–2.2)
ALBUMIN SERPL-MCNC: 4.3 G/DL (ref 3.4–5)
ALP BLD-CCNC: 77 U/L (ref 40–129)
ALT SERPL-CCNC: 15 U/L (ref 10–40)
ANION GAP SERPL CALCULATED.3IONS-SCNC: 15 MMOL/L (ref 3–16)
AST SERPL-CCNC: 22 U/L (ref 15–37)
BASOPHILS ABSOLUTE: 0.1 K/UL (ref 0–0.2)
BASOPHILS RELATIVE PERCENT: 1 %
BILIRUB SERPL-MCNC: 0.7 MG/DL (ref 0–1)
BUN BLDV-MCNC: 3 MG/DL (ref 7–20)
CALCIUM SERPL-MCNC: 9.3 MG/DL (ref 8.3–10.6)
CHLORIDE BLD-SCNC: 100 MMOL/L (ref 99–110)
CO2: 24 MMOL/L (ref 21–32)
CREAT SERPL-MCNC: <0.5 MG/DL (ref 0.6–1.1)
EOSINOPHILS ABSOLUTE: 0.4 K/UL (ref 0–0.6)
EOSINOPHILS RELATIVE PERCENT: 6.5 %
GFR AFRICAN AMERICAN: >60
GFR NON-AFRICAN AMERICAN: >60
GLOBULIN: 2.7 G/DL
GLUCOSE BLD-MCNC: 183 MG/DL (ref 70–99)
HCT VFR BLD CALC: 37.9 % (ref 36–48)
HEMOGLOBIN: 13 G/DL (ref 12–16)
LYMPHOCYTES ABSOLUTE: 1.9 K/UL (ref 1–5.1)
LYMPHOCYTES RELATIVE PERCENT: 29.4 %
MCH RBC QN AUTO: 31.2 PG (ref 26–34)
MCHC RBC AUTO-ENTMCNC: 34.3 G/DL (ref 31–36)
MCV RBC AUTO: 90.9 FL (ref 80–100)
MONOCYTES ABSOLUTE: 0.5 K/UL (ref 0–1.3)
MONOCYTES RELATIVE PERCENT: 7.3 %
NEUTROPHILS ABSOLUTE: 3.5 K/UL (ref 1.7–7.7)
NEUTROPHILS RELATIVE PERCENT: 55.8 %
PDW BLD-RTO: 13.6 % (ref 12.4–15.4)
PLATELET # BLD: 261 K/UL (ref 135–450)
PMV BLD AUTO: 9.6 FL (ref 5–10.5)
POTASSIUM SERPL-SCNC: 4 MMOL/L (ref 3.5–5.1)
RBC # BLD: 4.16 M/UL (ref 4–5.2)
SODIUM BLD-SCNC: 139 MMOL/L (ref 136–145)
TOTAL PROTEIN: 7 G/DL (ref 6.4–8.2)
WBC # BLD: 6.3 K/UL (ref 4–11)

## 2018-12-15 PROCEDURE — 85025 COMPLETE CBC W/AUTO DIFF WBC: CPT

## 2018-12-15 PROCEDURE — 36415 COLL VENOUS BLD VENIPUNCTURE: CPT

## 2018-12-15 PROCEDURE — 83036 HEMOGLOBIN GLYCOSYLATED A1C: CPT

## 2018-12-15 PROCEDURE — 80053 COMPREHEN METABOLIC PANEL: CPT

## 2018-12-16 LAB
ESTIMATED AVERAGE GLUCOSE: 220.2 MG/DL
HBA1C MFR BLD: 9.3 %

## 2018-12-19 ENCOUNTER — ANESTHESIA EVENT (OUTPATIENT)
Dept: OPERATING ROOM | Age: 58
End: 2018-12-19
Payer: COMMERCIAL

## 2018-12-19 ENCOUNTER — APPOINTMENT (OUTPATIENT)
Dept: GENERAL RADIOLOGY | Age: 58
End: 2018-12-19
Attending: SURGERY
Payer: COMMERCIAL

## 2018-12-19 ENCOUNTER — ANESTHESIA (OUTPATIENT)
Dept: OPERATING ROOM | Age: 58
End: 2018-12-19
Payer: COMMERCIAL

## 2018-12-19 ENCOUNTER — HOSPITAL ENCOUNTER (OUTPATIENT)
Age: 58
Setting detail: OUTPATIENT SURGERY
Discharge: HOME | End: 2018-12-19
Attending: SURGERY | Admitting: SURGERY
Payer: COMMERCIAL

## 2018-12-19 VITALS
RESPIRATION RATE: 16 BRPM | TEMPERATURE: 97 F | DIASTOLIC BLOOD PRESSURE: 56 MMHG | WEIGHT: 150.7 LBS | OXYGEN SATURATION: 97 % | HEIGHT: 63 IN | BODY MASS INDEX: 26.7 KG/M2 | SYSTOLIC BLOOD PRESSURE: 118 MMHG | HEART RATE: 87 BPM

## 2018-12-19 VITALS
TEMPERATURE: 96.8 F | SYSTOLIC BLOOD PRESSURE: 99 MMHG | DIASTOLIC BLOOD PRESSURE: 50 MMHG | OXYGEN SATURATION: 99 % | RESPIRATION RATE: 14 BRPM

## 2018-12-19 DIAGNOSIS — I10 ESSENTIAL HYPERTENSION: ICD-10-CM

## 2018-12-19 LAB
ANION GAP SERPL CALCULATED.3IONS-SCNC: 16 MMOL/L (ref 3–16)
BUN BLDV-MCNC: 5 MG/DL (ref 7–20)
CALCIUM SERPL-MCNC: 9.6 MG/DL (ref 8.3–10.6)
CHLORIDE BLD-SCNC: 100 MMOL/L (ref 99–110)
CO2: 24 MMOL/L (ref 21–32)
CREAT SERPL-MCNC: <0.5 MG/DL (ref 0.6–1.1)
GFR AFRICAN AMERICAN: >60
GFR NON-AFRICAN AMERICAN: >60
GLUCOSE BLD-MCNC: 176 MG/DL (ref 70–99)
GLUCOSE BLD-MCNC: 190 MG/DL (ref 70–99)
GLUCOSE BLD-MCNC: 238 MG/DL (ref 70–99)
GLUCOSE BLD-MCNC: 277 MG/DL (ref 70–99)
HCT VFR BLD CALC: 39.7 % (ref 36–48)
HEMOGLOBIN: 13.7 G/DL (ref 12–16)
MCH RBC QN AUTO: 31 PG (ref 26–34)
MCHC RBC AUTO-ENTMCNC: 34.5 G/DL (ref 31–36)
MCV RBC AUTO: 89.9 FL (ref 80–100)
PDW BLD-RTO: 13.5 % (ref 12.4–15.4)
PERFORMED ON: ABNORMAL
PLATELET # BLD: 291 K/UL (ref 135–450)
PMV BLD AUTO: 8.7 FL (ref 5–10.5)
POTASSIUM SERPL-SCNC: 4 MMOL/L (ref 3.5–5.1)
RBC # BLD: 4.41 M/UL (ref 4–5.2)
SODIUM BLD-SCNC: 140 MMOL/L (ref 136–145)
WBC # BLD: 6.8 K/UL (ref 4–11)

## 2018-12-19 PROCEDURE — 2709999900 HC NON-CHARGEABLE SUPPLY: Performed by: SURGERY

## 2018-12-19 PROCEDURE — 6360000002 HC RX W HCPCS: Performed by: NURSE ANESTHETIST, CERTIFIED REGISTERED

## 2018-12-19 PROCEDURE — 2580000003 HC RX 258: Performed by: ANESTHESIOLOGY

## 2018-12-19 PROCEDURE — 6360000002 HC RX W HCPCS: Performed by: SURGERY

## 2018-12-19 PROCEDURE — 2580000003 HC RX 258: Performed by: SURGERY

## 2018-12-19 PROCEDURE — 3700000000 HC ANESTHESIA ATTENDED CARE: Performed by: SURGERY

## 2018-12-19 PROCEDURE — 7100000010 HC PHASE II RECOVERY - FIRST 15 MIN: Performed by: SURGERY

## 2018-12-19 PROCEDURE — 6360000002 HC RX W HCPCS

## 2018-12-19 PROCEDURE — 47563 LAPARO CHOLECYSTECTOMY/GRAPH: CPT | Performed by: SURGERY

## 2018-12-19 PROCEDURE — 2500000003 HC RX 250 WO HCPCS: Performed by: NURSE ANESTHETIST, CERTIFIED REGISTERED

## 2018-12-19 PROCEDURE — 7100000001 HC PACU RECOVERY - ADDTL 15 MIN: Performed by: SURGERY

## 2018-12-19 PROCEDURE — 3700000001 HC ADD 15 MINUTES (ANESTHESIA): Performed by: SURGERY

## 2018-12-19 PROCEDURE — 36415 COLL VENOUS BLD VENIPUNCTURE: CPT

## 2018-12-19 PROCEDURE — 7100000000 HC PACU RECOVERY - FIRST 15 MIN: Performed by: SURGERY

## 2018-12-19 PROCEDURE — 88304 TISSUE EXAM BY PATHOLOGIST: CPT

## 2018-12-19 PROCEDURE — 80048 BASIC METABOLIC PNL TOTAL CA: CPT

## 2018-12-19 PROCEDURE — 6370000000 HC RX 637 (ALT 250 FOR IP)

## 2018-12-19 PROCEDURE — 2720000010 HC SURG SUPPLY STERILE: Performed by: SURGERY

## 2018-12-19 PROCEDURE — 6360000002 HC RX W HCPCS: Performed by: ANESTHESIOLOGY

## 2018-12-19 PROCEDURE — 85027 COMPLETE CBC AUTOMATED: CPT

## 2018-12-19 PROCEDURE — 7100000011 HC PHASE II RECOVERY - ADDTL 15 MIN: Performed by: SURGERY

## 2018-12-19 PROCEDURE — 3600000004 HC SURGERY LEVEL 4 BASE: Performed by: SURGERY

## 2018-12-19 PROCEDURE — 6370000000 HC RX 637 (ALT 250 FOR IP): Performed by: ANESTHESIOLOGY

## 2018-12-19 PROCEDURE — 2500000003 HC RX 250 WO HCPCS: Performed by: SURGERY

## 2018-12-19 PROCEDURE — 74300 X-RAY BILE DUCTS/PANCREAS: CPT

## 2018-12-19 PROCEDURE — 6360000004 HC RX CONTRAST MEDICATION: Performed by: SURGERY

## 2018-12-19 PROCEDURE — 3600000014 HC SURGERY LEVEL 4 ADDTL 15MIN: Performed by: SURGERY

## 2018-12-19 RX ORDER — FENTANYL CITRATE 50 UG/ML
25 INJECTION, SOLUTION INTRAMUSCULAR; INTRAVENOUS EVERY 5 MIN PRN
Status: DISCONTINUED | OUTPATIENT
Start: 2018-12-19 | End: 2018-12-19 | Stop reason: HOSPADM

## 2018-12-19 RX ORDER — FENTANYL CITRATE 50 UG/ML
INJECTION, SOLUTION INTRAMUSCULAR; INTRAVENOUS PRN
Status: DISCONTINUED | OUTPATIENT
Start: 2018-12-19 | End: 2018-12-19 | Stop reason: SDUPTHER

## 2018-12-19 RX ORDER — PROMETHAZINE HYDROCHLORIDE 25 MG/ML
6.25 INJECTION, SOLUTION INTRAMUSCULAR; INTRAVENOUS EVERY 10 MIN PRN
Status: DISCONTINUED | OUTPATIENT
Start: 2018-12-19 | End: 2018-12-19 | Stop reason: HOSPADM

## 2018-12-19 RX ORDER — ONDANSETRON 2 MG/ML
INJECTION INTRAMUSCULAR; INTRAVENOUS PRN
Status: DISCONTINUED | OUTPATIENT
Start: 2018-12-19 | End: 2018-12-19 | Stop reason: SDUPTHER

## 2018-12-19 RX ORDER — PROMETHAZINE HYDROCHLORIDE 25 MG/ML
INJECTION, SOLUTION INTRAMUSCULAR; INTRAVENOUS
Status: COMPLETED
Start: 2018-12-19 | End: 2018-12-19

## 2018-12-19 RX ORDER — FENTANYL CITRATE 50 UG/ML
50 INJECTION, SOLUTION INTRAMUSCULAR; INTRAVENOUS EVERY 5 MIN PRN
Status: DISCONTINUED | OUTPATIENT
Start: 2018-12-19 | End: 2018-12-19 | Stop reason: HOSPADM

## 2018-12-19 RX ORDER — PROPOFOL 10 MG/ML
INJECTION, EMULSION INTRAVENOUS PRN
Status: DISCONTINUED | OUTPATIENT
Start: 2018-12-19 | End: 2018-12-19 | Stop reason: SDUPTHER

## 2018-12-19 RX ORDER — ROCURONIUM BROMIDE 10 MG/ML
INJECTION, SOLUTION INTRAVENOUS PRN
Status: DISCONTINUED | OUTPATIENT
Start: 2018-12-19 | End: 2018-12-19 | Stop reason: SDUPTHER

## 2018-12-19 RX ORDER — MAGNESIUM HYDROXIDE 1200 MG/15ML
LIQUID ORAL CONTINUOUS PRN
Status: DISCONTINUED | OUTPATIENT
Start: 2018-12-19 | End: 2018-12-19 | Stop reason: HOSPADM

## 2018-12-19 RX ORDER — SUCCINYLCHOLINE CHLORIDE 20 MG/ML
INJECTION INTRAMUSCULAR; INTRAVENOUS PRN
Status: DISCONTINUED | OUTPATIENT
Start: 2018-12-19 | End: 2018-12-19 | Stop reason: SDUPTHER

## 2018-12-19 RX ORDER — SODIUM CHLORIDE 9 MG/ML
INJECTION, SOLUTION INTRAVENOUS CONTINUOUS
Status: DISCONTINUED | OUTPATIENT
Start: 2018-12-19 | End: 2018-12-19 | Stop reason: HOSPADM

## 2018-12-19 RX ORDER — LIDOCAINE HYDROCHLORIDE 20 MG/ML
INJECTION, SOLUTION INFILTRATION; PERINEURAL PRN
Status: DISCONTINUED | OUTPATIENT
Start: 2018-12-19 | End: 2018-12-19 | Stop reason: SDUPTHER

## 2018-12-19 RX ORDER — SODIUM CHLORIDE 0.9 % (FLUSH) 0.9 %
10 SYRINGE (ML) INJECTION EVERY 12 HOURS SCHEDULED
Status: DISCONTINUED | OUTPATIENT
Start: 2018-12-19 | End: 2018-12-19 | Stop reason: HOSPADM

## 2018-12-19 RX ORDER — BUPIVACAINE HYDROCHLORIDE AND EPINEPHRINE 5; 5 MG/ML; UG/ML
INJECTION, SOLUTION EPIDURAL; INTRACAUDAL; PERINEURAL
Status: COMPLETED | OUTPATIENT
Start: 2018-12-19 | End: 2018-12-19

## 2018-12-19 RX ORDER — HYDROMORPHONE HCL 110MG/55ML
0.25 PATIENT CONTROLLED ANALGESIA SYRINGE INTRAVENOUS EVERY 5 MIN PRN
Status: DISCONTINUED | OUTPATIENT
Start: 2018-12-19 | End: 2018-12-19 | Stop reason: HOSPADM

## 2018-12-19 RX ORDER — AMLODIPINE BESYLATE 2.5 MG/1
TABLET ORAL
Qty: 90 TABLET | Refills: 0 | Status: SHIPPED | OUTPATIENT
Start: 2018-12-19 | End: 2019-03-07 | Stop reason: SDUPTHER

## 2018-12-19 RX ORDER — HYDROMORPHONE HCL 110MG/55ML
0.5 PATIENT CONTROLLED ANALGESIA SYRINGE INTRAVENOUS EVERY 5 MIN PRN
Status: DISCONTINUED | OUTPATIENT
Start: 2018-12-19 | End: 2018-12-19 | Stop reason: HOSPADM

## 2018-12-19 RX ORDER — ONDANSETRON 2 MG/ML
4 INJECTION INTRAMUSCULAR; INTRAVENOUS
Status: COMPLETED | OUTPATIENT
Start: 2018-12-19 | End: 2018-12-19

## 2018-12-19 RX ORDER — SODIUM CHLORIDE, SODIUM LACTATE, POTASSIUM CHLORIDE, AND CALCIUM CHLORIDE .6; .31; .03; .02 G/100ML; G/100ML; G/100ML; G/100ML
IRRIGANT IRRIGATION
Status: COMPLETED | OUTPATIENT
Start: 2018-12-19 | End: 2018-12-19

## 2018-12-19 RX ORDER — SODIUM CHLORIDE, SODIUM LACTATE, POTASSIUM CHLORIDE, CALCIUM CHLORIDE 600; 310; 30; 20 MG/100ML; MG/100ML; MG/100ML; MG/100ML
INJECTION, SOLUTION INTRAVENOUS CONTINUOUS
Status: DISCONTINUED | OUTPATIENT
Start: 2018-12-19 | End: 2018-12-19 | Stop reason: HOSPADM

## 2018-12-19 RX ORDER — SODIUM CHLORIDE 0.9 % (FLUSH) 0.9 %
10 SYRINGE (ML) INJECTION PRN
Status: DISCONTINUED | OUTPATIENT
Start: 2018-12-19 | End: 2018-12-19 | Stop reason: HOSPADM

## 2018-12-19 RX ORDER — LABETALOL HYDROCHLORIDE 5 MG/ML
5 INJECTION, SOLUTION INTRAVENOUS EVERY 10 MIN PRN
Status: DISCONTINUED | OUTPATIENT
Start: 2018-12-19 | End: 2018-12-19 | Stop reason: HOSPADM

## 2018-12-19 RX ORDER — CIPROFLOXACIN 2 MG/ML
400 INJECTION, SOLUTION INTRAVENOUS ONCE
Status: COMPLETED | OUTPATIENT
Start: 2018-12-19 | End: 2018-12-19

## 2018-12-19 RX ORDER — GLYCOPYRROLATE 0.2 MG/ML
INJECTION INTRAMUSCULAR; INTRAVENOUS PRN
Status: DISCONTINUED | OUTPATIENT
Start: 2018-12-19 | End: 2018-12-19 | Stop reason: SDUPTHER

## 2018-12-19 RX ADMIN — PHENYLEPHRINE HYDROCHLORIDE 100 MCG: 10 INJECTION INTRAVENOUS at 13:36

## 2018-12-19 RX ADMIN — ROCURONIUM BROMIDE 5 MG: 10 INJECTION, SOLUTION INTRAVENOUS at 13:17

## 2018-12-19 RX ADMIN — LIDOCAINE HYDROCHLORIDE 50 MG: 20 INJECTION, SOLUTION INFILTRATION; PERINEURAL at 13:17

## 2018-12-19 RX ADMIN — FENTANYL CITRATE 50 MCG: 50 INJECTION, SOLUTION INTRAMUSCULAR; INTRAVENOUS at 14:02

## 2018-12-19 RX ADMIN — INSULIN HUMAN 4 UNITS: 100 INJECTION, SOLUTION PARENTERAL at 10:30

## 2018-12-19 RX ADMIN — PROMETHAZINE HYDROCHLORIDE 6.25 MG: 25 INJECTION INTRAMUSCULAR; INTRAVENOUS at 16:34

## 2018-12-19 RX ADMIN — PROMETHAZINE HYDROCHLORIDE 6.25 MG: 25 INJECTION, SOLUTION INTRAMUSCULAR; INTRAVENOUS at 16:34

## 2018-12-19 RX ADMIN — FENTANYL CITRATE 25 MCG: 50 INJECTION, SOLUTION INTRAMUSCULAR; INTRAVENOUS at 13:41

## 2018-12-19 RX ADMIN — GLYCOPYRROLATE 0.2 MG: 0.2 INJECTION, SOLUTION INTRAMUSCULAR; INTRAVENOUS at 13:25

## 2018-12-19 RX ADMIN — PHENYLEPHRINE HYDROCHLORIDE 100 MCG: 10 INJECTION INTRAVENOUS at 13:24

## 2018-12-19 RX ADMIN — ONDANSETRON HYDROCHLORIDE 4 MG: 2 INJECTION, SOLUTION INTRAMUSCULAR; INTRAVENOUS at 15:39

## 2018-12-19 RX ADMIN — SODIUM CHLORIDE, POTASSIUM CHLORIDE, SODIUM LACTATE AND CALCIUM CHLORIDE: 600; 310; 30; 20 INJECTION, SOLUTION INTRAVENOUS at 10:30

## 2018-12-19 RX ADMIN — CIPROFLOXACIN 400 MG: 2 INJECTION, SOLUTION INTRAVENOUS at 13:10

## 2018-12-19 RX ADMIN — ROCURONIUM BROMIDE 5 MG: 10 INJECTION, SOLUTION INTRAVENOUS at 13:41

## 2018-12-19 RX ADMIN — PHENYLEPHRINE HYDROCHLORIDE 100 MCG: 10 INJECTION INTRAVENOUS at 13:27

## 2018-12-19 RX ADMIN — PHENYLEPHRINE HYDROCHLORIDE 100 MCG: 10 INJECTION INTRAVENOUS at 14:02

## 2018-12-19 RX ADMIN — PROPOFOL 150 MG: 10 INJECTION, EMULSION INTRAVENOUS at 13:17

## 2018-12-19 RX ADMIN — GLYCOPYRROLATE 0.2 MG: 0.2 INJECTION, SOLUTION INTRAMUSCULAR; INTRAVENOUS at 13:36

## 2018-12-19 RX ADMIN — ONDANSETRON 4 MG: 2 INJECTION INTRAMUSCULAR; INTRAVENOUS at 13:41

## 2018-12-19 RX ADMIN — PHENYLEPHRINE HYDROCHLORIDE 100 MCG: 10 INJECTION INTRAVENOUS at 13:30

## 2018-12-19 RX ADMIN — SUCCINYLCHOLINE CHLORIDE 100 MG: 20 INJECTION, SOLUTION INTRAMUSCULAR; INTRAVENOUS at 13:17

## 2018-12-19 RX ADMIN — FENTANYL CITRATE 75 MCG: 50 INJECTION, SOLUTION INTRAMUSCULAR; INTRAVENOUS at 13:17

## 2018-12-19 ASSESSMENT — PULMONARY FUNCTION TESTS
PIF_VALUE: 10
PIF_VALUE: 27
PIF_VALUE: 26
PIF_VALUE: 29
PIF_VALUE: 1
PIF_VALUE: 26
PIF_VALUE: 25
PIF_VALUE: 17
PIF_VALUE: 24
PIF_VALUE: 16
PIF_VALUE: 24
PIF_VALUE: 10
PIF_VALUE: 0
PIF_VALUE: 17
PIF_VALUE: 26
PIF_VALUE: 25
PIF_VALUE: 10
PIF_VALUE: 10
PIF_VALUE: 18
PIF_VALUE: 26
PIF_VALUE: 29
PIF_VALUE: 18
PIF_VALUE: 26
PIF_VALUE: 10
PIF_VALUE: 10
PIF_VALUE: 26
PIF_VALUE: 10
PIF_VALUE: 26
PIF_VALUE: 26
PIF_VALUE: 17
PIF_VALUE: 17
PIF_VALUE: 26
PIF_VALUE: 17
PIF_VALUE: 18
PIF_VALUE: 27
PIF_VALUE: 26
PIF_VALUE: 10
PIF_VALUE: 4
PIF_VALUE: 27
PIF_VALUE: 11
PIF_VALUE: 0
PIF_VALUE: 17
PIF_VALUE: 2
PIF_VALUE: 28
PIF_VALUE: 10
PIF_VALUE: 10
PIF_VALUE: 1
PIF_VALUE: 12
PIF_VALUE: 17
PIF_VALUE: 0
PIF_VALUE: 17
PIF_VALUE: 27
PIF_VALUE: 25
PIF_VALUE: 25
PIF_VALUE: 10
PIF_VALUE: 10
PIF_VALUE: 27
PIF_VALUE: 25
PIF_VALUE: 24
PIF_VALUE: 17
PIF_VALUE: 10
PIF_VALUE: 27
PIF_VALUE: 1
PIF_VALUE: 0
PIF_VALUE: 10
PIF_VALUE: 19
PIF_VALUE: 25
PIF_VALUE: 12
PIF_VALUE: 31

## 2018-12-19 ASSESSMENT — PAIN SCALES - GENERAL
PAINLEVEL_OUTOF10: 0

## 2018-12-19 ASSESSMENT — PAIN - FUNCTIONAL ASSESSMENT: PAIN_FUNCTIONAL_ASSESSMENT: 0-10

## 2018-12-19 ASSESSMENT — PAIN DESCRIPTION - PAIN TYPE: TYPE: SURGICAL PAIN

## 2018-12-19 ASSESSMENT — PAIN DESCRIPTION - LOCATION: LOCATION: ABDOMEN

## 2018-12-20 NOTE — ANESTHESIA POSTPROCEDURE EVALUATION
Department of Anesthesiology  Postprocedure Note    Patient: Sara Mae  MRN: [id number]  YOB: 1960  Date of evaluation: 12/20/2018  Time:  11:20 AM     Procedure Summary     Date:  12/19/18 Room / Location:  [facility] OR  / [facility] OR    Anesthesia Start:  5309 Anesthesia Stop:  9723    Procedure:  LAPAROSCOPIC CHOLECYSTECTOMY; CHOLANGIOGRAM (N/A Abdomen) Diagnosis:  (K80.20 SYMPTOMATIC CHOLELITHIASIS)    Surgeon:  Lolis Lopez MD Responsible Provider:  Светлана Nielsen MD    Anesthesia Type:  general ASA Status:  3          Anesthesia Type: general    Yola Phase I: Yola Score: 10    Yola Phase II: Yola Score: 10    Last vitals: Reviewed and per EMR flowsheets.        Anesthesia Post Evaluation    Patient location during evaluation: PACU  Complications: no  Cardiovascular status: hemodynamically stable  Respiratory status: acceptable

## 2018-12-22 RX ORDER — SITAGLIPTIN 100 MG/1
TABLET, FILM COATED ORAL
Qty: 90 TABLET | Refills: 0 | Status: SHIPPED | OUTPATIENT
Start: 2018-12-22 | End: 2019-03-07 | Stop reason: SDUPTHER

## 2018-12-31 RX ORDER — LOSARTAN POTASSIUM 100 MG/1
TABLET ORAL
Qty: 90 TABLET | Refills: 0 | Status: SHIPPED | OUTPATIENT
Start: 2018-12-31 | End: 2019-03-02

## 2019-01-08 ENCOUNTER — OFFICE VISIT (OUTPATIENT)
Dept: SURGERY | Age: 59
End: 2019-01-08

## 2019-01-08 VITALS — WEIGHT: 150 LBS | BODY MASS INDEX: 26.57 KG/M2 | SYSTOLIC BLOOD PRESSURE: 140 MMHG | DIASTOLIC BLOOD PRESSURE: 75 MMHG

## 2019-01-08 DIAGNOSIS — K80.20 SYMPTOMATIC CHOLELITHIASIS: Primary | ICD-10-CM

## 2019-01-08 PROCEDURE — 99024 POSTOP FOLLOW-UP VISIT: CPT | Performed by: SURGERY

## 2019-01-21 RX ORDER — ROSUVASTATIN CALCIUM 10 MG/1
TABLET, COATED ORAL
Qty: 45 TABLET | Refills: 5 | Status: SHIPPED | OUTPATIENT
Start: 2019-01-21 | End: 2019-03-07 | Stop reason: SDUPTHER

## 2019-01-24 ENCOUNTER — TELEPHONE (OUTPATIENT)
Dept: FAMILY MEDICINE CLINIC | Age: 59
End: 2019-01-24

## 2019-01-31 ENCOUNTER — TELEPHONE (OUTPATIENT)
Dept: FAMILY MEDICINE CLINIC | Age: 59
End: 2019-01-31

## 2019-01-31 DIAGNOSIS — E11.9 TYPE 2 DIABETES MELLITUS WITHOUT COMPLICATION, UNSPECIFIED WHETHER LONG TERM INSULIN USE (HCC): Primary | ICD-10-CM

## 2019-02-18 DIAGNOSIS — E11.9 TYPE 2 DIABETES MELLITUS WITHOUT COMPLICATION, UNSPECIFIED WHETHER LONG TERM INSULIN USE (HCC): ICD-10-CM

## 2019-02-19 DIAGNOSIS — E11.9 TYPE 2 DIABETES MELLITUS WITHOUT COMPLICATION, UNSPECIFIED WHETHER LONG TERM INSULIN USE (HCC): ICD-10-CM

## 2019-02-20 DIAGNOSIS — E11.9 TYPE 2 DIABETES MELLITUS WITHOUT COMPLICATION, UNSPECIFIED WHETHER LONG TERM INSULIN USE (HCC): ICD-10-CM

## 2019-03-02 ENCOUNTER — OFFICE VISIT (OUTPATIENT)
Dept: FAMILY MEDICINE CLINIC | Age: 59
End: 2019-03-02
Payer: COMMERCIAL

## 2019-03-02 VITALS
HEART RATE: 94 BPM | WEIGHT: 148 LBS | OXYGEN SATURATION: 98 % | DIASTOLIC BLOOD PRESSURE: 74 MMHG | SYSTOLIC BLOOD PRESSURE: 170 MMHG | BODY MASS INDEX: 26.22 KG/M2

## 2019-03-02 DIAGNOSIS — E78.2 MIXED HYPERLIPIDEMIA: ICD-10-CM

## 2019-03-02 DIAGNOSIS — I10 ESSENTIAL HYPERTENSION: Primary | ICD-10-CM

## 2019-03-02 DIAGNOSIS — E11.9 TYPE 2 DIABETES MELLITUS WITHOUT COMPLICATION, UNSPECIFIED WHETHER LONG TERM INSULIN USE (HCC): ICD-10-CM

## 2019-03-02 PROCEDURE — 99214 OFFICE O/P EST MOD 30 MIN: CPT | Performed by: FAMILY MEDICINE

## 2019-03-02 ASSESSMENT — ENCOUNTER SYMPTOMS
COUGH: 0
CHEST TIGHTNESS: 0
DIARRHEA: 1
ABDOMINAL PAIN: 0
SINUS PAIN: 0
BLURRED VISION: 0
SINUS PRESSURE: 0
SHORTNESS OF BREATH: 0
WHEEZING: 0
BACK PAIN: 0
CONSTIPATION: 0
RHINORRHEA: 1

## 2019-03-07 DIAGNOSIS — I10 ESSENTIAL HYPERTENSION: ICD-10-CM

## 2019-03-07 RX ORDER — ROSUVASTATIN CALCIUM 10 MG/1
TABLET, COATED ORAL
Qty: 45 TABLET | Refills: 3 | Status: SHIPPED | OUTPATIENT
Start: 2019-03-07 | End: 2019-07-26 | Stop reason: SDUPTHER

## 2019-03-07 RX ORDER — AMLODIPINE BESYLATE 2.5 MG/1
TABLET ORAL
Qty: 90 TABLET | Refills: 3 | Status: SHIPPED | OUTPATIENT
Start: 2019-03-07 | End: 2019-07-26 | Stop reason: SDUPTHER

## 2019-03-29 ENCOUNTER — HOSPITAL ENCOUNTER (OUTPATIENT)
Dept: WOMENS IMAGING | Age: 59
Discharge: HOME | End: 2019-03-29
Payer: COMMERCIAL

## 2019-03-29 DIAGNOSIS — Z12.31 SCREENING MAMMOGRAM, ENCOUNTER FOR: ICD-10-CM

## 2019-03-29 PROCEDURE — 77067 SCR MAMMO BI INCL CAD: CPT

## 2019-04-01 RX ORDER — LOSARTAN POTASSIUM 100 MG/1
TABLET ORAL
Qty: 90 TABLET | Refills: 0 | Status: SHIPPED | OUTPATIENT
Start: 2019-04-01 | End: 2019-04-26 | Stop reason: ALTCHOICE

## 2019-04-22 DIAGNOSIS — E11.9 TYPE 2 DIABETES MELLITUS WITHOUT COMPLICATION, UNSPECIFIED WHETHER LONG TERM INSULIN USE (HCC): ICD-10-CM

## 2019-04-26 ENCOUNTER — OFFICE VISIT (OUTPATIENT)
Dept: INTERNAL MEDICINE CLINIC | Age: 59
End: 2019-04-26
Payer: COMMERCIAL

## 2019-04-26 VITALS
BODY MASS INDEX: 25.78 KG/M2 | SYSTOLIC BLOOD PRESSURE: 138 MMHG | HEIGHT: 64 IN | OXYGEN SATURATION: 99 % | DIASTOLIC BLOOD PRESSURE: 76 MMHG | WEIGHT: 151 LBS | HEART RATE: 92 BPM

## 2019-04-26 DIAGNOSIS — E78.5 HYPERLIPIDEMIA LDL GOAL <70: ICD-10-CM

## 2019-04-26 DIAGNOSIS — I10 ESSENTIAL HYPERTENSION: ICD-10-CM

## 2019-04-26 DIAGNOSIS — E11.9 TYPE 2 DIABETES MELLITUS WITHOUT COMPLICATION, WITHOUT LONG-TERM CURRENT USE OF INSULIN (HCC): ICD-10-CM

## 2019-04-26 DIAGNOSIS — D05.12 DUCTAL CARCINOMA IN SITU (DCIS) OF LEFT BREAST: ICD-10-CM

## 2019-04-26 DIAGNOSIS — R47.01 EXPRESSIVE APHASIA: ICD-10-CM

## 2019-04-26 DIAGNOSIS — E11.9 TYPE 2 DIABETES MELLITUS WITHOUT COMPLICATION, WITHOUT LONG-TERM CURRENT USE OF INSULIN (HCC): Primary | ICD-10-CM

## 2019-04-26 DIAGNOSIS — M81.0 AGE-RELATED OSTEOPOROSIS WITHOUT CURRENT PATHOLOGICAL FRACTURE: ICD-10-CM

## 2019-04-26 DIAGNOSIS — G43.009 MIGRAINE WITHOUT AURA AND WITHOUT STATUS MIGRAINOSUS, NOT INTRACTABLE: ICD-10-CM

## 2019-04-26 PROBLEM — E78.2 MIXED HYPERLIPIDEMIA: Status: RESOLVED | Noted: 2019-03-02 | Resolved: 2019-04-26

## 2019-04-26 LAB
A/G RATIO: 1.6 (ref 1.1–2.2)
ALBUMIN SERPL-MCNC: 4.5 G/DL (ref 3.4–5)
ALP BLD-CCNC: 109 U/L (ref 40–129)
ALT SERPL-CCNC: 25 U/L (ref 10–40)
ANION GAP SERPL CALCULATED.3IONS-SCNC: 15 MMOL/L (ref 3–16)
AST SERPL-CCNC: 32 U/L (ref 15–37)
BASOPHILS ABSOLUTE: 0 K/UL (ref 0–0.2)
BASOPHILS RELATIVE PERCENT: 0.4 %
BILIRUB SERPL-MCNC: 0.9 MG/DL (ref 0–1)
BUN BLDV-MCNC: 7 MG/DL (ref 7–20)
CALCIUM SERPL-MCNC: 10.1 MG/DL (ref 8.3–10.6)
CHLORIDE BLD-SCNC: 99 MMOL/L (ref 99–110)
CO2: 23 MMOL/L (ref 21–32)
CREAT SERPL-MCNC: 0.7 MG/DL (ref 0.6–1.1)
EOSINOPHILS ABSOLUTE: 0.4 K/UL (ref 0–0.6)
EOSINOPHILS RELATIVE PERCENT: 4.6 %
GFR AFRICAN AMERICAN: >60
GFR NON-AFRICAN AMERICAN: >60
GLOBULIN: 2.9 G/DL
GLUCOSE BLD-MCNC: 318 MG/DL (ref 70–99)
HBA1C MFR BLD: 10.2 %
HCT VFR BLD CALC: 39.1 % (ref 36–48)
HEMOGLOBIN: 13.5 G/DL (ref 12–16)
LYMPHOCYTES ABSOLUTE: 1.6 K/UL (ref 1–5.1)
LYMPHOCYTES RELATIVE PERCENT: 20.5 %
MCH RBC QN AUTO: 31.3 PG (ref 26–34)
MCHC RBC AUTO-ENTMCNC: 34.5 G/DL (ref 31–36)
MCV RBC AUTO: 90.6 FL (ref 80–100)
MONOCYTES ABSOLUTE: 0.6 K/UL (ref 0–1.3)
MONOCYTES RELATIVE PERCENT: 7.5 %
NEUTROPHILS ABSOLUTE: 5.4 K/UL (ref 1.7–7.7)
NEUTROPHILS RELATIVE PERCENT: 67 %
PDW BLD-RTO: 13.6 % (ref 12.4–15.4)
PLATELET # BLD: 292 K/UL (ref 135–450)
PMV BLD AUTO: 8.9 FL (ref 5–10.5)
POTASSIUM SERPL-SCNC: 4 MMOL/L (ref 3.5–5.1)
RBC # BLD: 4.32 M/UL (ref 4–5.2)
SODIUM BLD-SCNC: 137 MMOL/L (ref 136–145)
TOTAL PROTEIN: 7.4 G/DL (ref 6.4–8.2)
WBC # BLD: 8 K/UL (ref 4–11)

## 2019-04-26 PROCEDURE — 83036 HEMOGLOBIN GLYCOSYLATED A1C: CPT | Performed by: INTERNAL MEDICINE

## 2019-04-26 PROCEDURE — 99203 OFFICE O/P NEW LOW 30 MIN: CPT | Performed by: INTERNAL MEDICINE

## 2019-04-26 RX ORDER — LANCETS 30 GAUGE
1 EACH MISCELLANEOUS 2 TIMES DAILY
Qty: 100 EACH | Refills: 5 | Status: SHIPPED | OUTPATIENT
Start: 2019-04-26 | End: 2022-06-06

## 2019-04-26 RX ORDER — GLUCOSAMINE HCL/CHONDROITIN SU 500-400 MG
CAPSULE ORAL
Qty: 100 STRIP | Refills: 5 | Status: SHIPPED | OUTPATIENT
Start: 2019-04-26

## 2019-04-26 ASSESSMENT — PATIENT HEALTH QUESTIONNAIRE - PHQ9
SUM OF ALL RESPONSES TO PHQ QUESTIONS 1-9: 0
1. LITTLE INTEREST OR PLEASURE IN DOING THINGS: 0
2. FEELING DOWN, DEPRESSED OR HOPELESS: 0
SUM OF ALL RESPONSES TO PHQ9 QUESTIONS 1 & 2: 0
SUM OF ALL RESPONSES TO PHQ QUESTIONS 1-9: 0

## 2019-04-26 ASSESSMENT — ENCOUNTER SYMPTOMS
DIARRHEA: 1
SHORTNESS OF BREATH: 0
CONSTIPATION: 0
CHEST TIGHTNESS: 0

## 2019-04-26 NOTE — ASSESSMENT & PLAN NOTE
Poorly controlled with p.o. CT hemoglobin A1c 10.2%. Discontinue Januvia and start Trulicity 4.29 mg weekly. Continue metformin 1000 mg b.i.d. and Lantus 60 units q.h.s. Advised to check sugars b.i.d.: Fasting and 2 hours after her largest meal.  Advised to increase Lantus by 2 units every week until fasting sugars are under 150. Will likely need added mealtime insulin. She should bring her blood sugars with her to her next appointment.

## 2019-04-26 NOTE — PROGRESS NOTES
Patient: Arcelia Farnsworth is a 60 y.o. female who presents today with the following Chief Complaint(s):  Chief Complaint   Patient presents with    Established New Doctor       HPI  Here today to establish. Previously saw Dr. Mai Perez. PMHX:  1. DM 2- on Metformin 1,000 mg BID, Jnauvia 100 mg qd, and Lantus 60 units qhs. States that her numbers are high (not sure as she has not been taking her readings). Does have some diarrhea with metformin. Last HbA1c was in December 2018. States that there were no changes made to her medications at that time. Needs a new meter. Last eye exam was in February 2019 [facility]). No current regular exercise. Lab Results   Component Value Date    LABA1C 9.3 12/15/2018     Lab Results   Component Value Date    .2 12/15/2018      2. Breast Cancer- s/p left mastectomy 4/19/18. Does follow with Dr. Valentina Weldon for surgery and Dr. Octavio Ruiz for oncology. Did not need XRT, CTX. Is on Arimidex (is starting to get some hot flashes). 3. HTN- on valsartan 320 mg qd and amlodipine 2.5 mg qd. Takes at [facility]. BP typically runs around 130/80. Occasionally does get lightheaded. 4. Hyperlipidemia- on Crestor 10 mg qd.     5. Osteoporosis- on Prolia. Just started in December 2018. Gets them at [facility]. Last DEXA was in June 2018. Had an episode about 1 month ago where she had a b/l HA and was unable to speak, could not figure out what she wanted to say. Lasted no more than 10 minutes. Does have a h/o infrequent migraines but typically affects her right side of her head and does affect her speech. Was wondering if it could have been related to Arimidex. Colonoscopy January 2019. Allergies   Allergen Reactions    Cefdinir Other (See Comments)     *Cephalosporins*: Pruritus.  Erythromycin Swelling     Throat swelling.  Ezetimibe-Simvastatin Other (See Comments)     Muscle cramps.  Lisinopril Other (See Comments)     Cough.      Niacin And Related Other (See Comments)     Niacin (antihyperlipidemic) *Antihyperlipidmics*: S.ADRIANNE Santizo Other Other (See Comments)     darvocet    Pcn [Penicillins] Hives    Zithromax [Azithromycin] Swelling     Throat swelling.        Past Medical History:   Diagnosis Date    Allergic rhinitis, cause unspecified     Cancer ([facility] [address]     left breast    Generalized osteoarthrosis, involving multiple sites     Pure hypercholesterolemia     Pure hyperglyceridemia     Type II or unspecified type diabetes mellitus without mention of complication, uncontrolled     Unspecified essential hypertension       Past Surgical History:   Procedure Laterality Date    CHOLECYSTECTOMY, LAPAROSCOPIC N/A 12/19/2018    LAPAROSCOPIC CHOLECYSTECTOMY; CHOLANGIOGRAM performed by Camilla Worthington MD at [address] [place] [address]      colonoscopy ordered 6/2010; 5/2011; 11/2011, recommended again and refused at this time on 3/30/12 & 4/2012     COLONOSCOPY N/A 11/29/2018    COLONOSCOPY POLYPECTOMY SNARE/COLD BIOPSY performed by Santana Bartholomew MD at [facility] Left 04/18/2019    Skin sparing mastectomy Sentinal Lymph node Bx Tissue expander    NASAL SEPTUM SURGERY      TONSILLECTOMY      WRIST SURGERY Right       Social History     Socioeconomic History    Marital status: Single     Spouse name: Not on file    Number of children: Not on file    Years of education: Not on file    Highest education level: Not on file   Occupational History    Not on file   Social Needs    Financial resource strain: Not on file    Food insecurity:     Worry: Not on file     Inability: Not on file    Transportation needs:     Medical: Not on file     Non-medical: Not on file   Tobacco Use    Smoking status: Never Smoker    Smokeless tobacco: Never Used   Substance and Sexual Activity    Alcohol use: No     Alcohol/week: 0.0 oz    Drug use: No    Sexual activity: Not on file   Lifestyle    Physical activity:     Days per week: Not on file Minutes per session: Not on file    Stress: Not on file   Relationships    Social connections:     Talks on phone: Not on file     Gets together: Not on file     Attends Restorationist service: Not on file     Active member of club or organization: Not on file     Attends meetings of clubs or organizations: Not on file     Relationship status: Not on file    Intimate partner violence:     Fear of current or ex partner: Not on file     Emotionally abused: Not on file     Physically abused: Not on file     Forced sexual activity: Not on file   Other Topics Concern    Not on file   Social History Narrative    Not on file     Family History   Problem Relation Age of Onset    Asthma Maternal Grandmother     Hypertension Maternal Grandmother     Breast Cancer Maternal Grandmother 76    Heart Disease Maternal Grandfather     Hypertension Paternal Grandmother     Heart Disease Paternal Grandfather     Hypertension Paternal Grandfather     COPD Father         Outpatient Medications Prior to Visit   Medication Sig Dispense Refill    insulin glargine (LANTUS SOLOSTAR) 100 UNIT/ML injection pen Inject 60 Units into the skin nightly 5 pen 2    rosuvastatin (CRESTOR) 10 MG tablet TAKE 1 TABLET EVERY OTHER DAY 45 tablet 3    amLODIPine (NORVASC) 2.5 MG tablet TAKE 1 TABLET DAILY 90 tablet 3    Insulin Pen Needle (B-D ULTRAFINE III SHORT PEN) 31G X 8 MM MISC USE ONCE DAILY 90 each 3    metFORMIN (GLUCOPHAGE) 1000 MG tablet TAKE 1 TABLET TWICE A DAY WITH MEALS 180 tablet 3    diphenhydrAMINE (BENADRYL) 25 MG capsule Take 25 mg by mouth every 6 hours as needed for Itching      vitamin C (ASCORBIC ACID) 500 MG tablet Take 500 mg by mouth daily      CALCIUM PO Take by mouth 2 times daily      Denosumab (PROLIA SC) Inject into the skin every 6 months      anastrozole (ARIMIDEX) 1 MG tablet Take 1 mg by mouth nightly      valsartan (DIOVAN) 320 MG tablet Take 320 mg by mouth nightly       Simethicone 180 MG CAPS Take by mouth 2 times daily as needed      Cholecalciferol (VITAMIN D3) 2000 units CAPS Take by mouth daily       RaNITidine HCl (ZANTAC 150 MAXIMUM STRENGTH PO) Take 150 mg by mouth 2 times daily (before meals)      Probiotic Product (PROBIOTIC DAILY PO) Take by mouth daily       Lancet Devices (SOFT TOUCH LANCET DEVICE) MISC by Does not apply route daily.  losartan (COZAAR) 100 MG tablet TAKE 1 TABLET DAILY 90 tablet 0    SITagliptin (JANUVIA) 100 MG tablet TAKE 1 TABLET DAILY 90 tablet 3    ibuprofen (ADVIL;MOTRIN) 600 MG tablet Take 1 tablet by mouth 3 times daily as needed 90 tablet 1    Glucose Blood (ACCU-CHEK ACTIVE VI) 1 strip by In Vitro route daily. No facility-administered medications prior to visit. Patient'spast medical history, surgical history, family history, medications,  and allergies  were all reviewed and updated as appropriate today. Review of Systems   Constitutional: Negative for appetite change, fatigue and fever. Respiratory: Negative for chest tightness and shortness of breath. Cardiovascular: Negative for chest pain. Gastrointestinal: Positive for diarrhea (occasional). Negative for constipation. Skin: Negative for rash. /76   Pulse 92   Ht 5' 3.5\" (1.613 m)   Wt 151 lb (68.5 kg)   SpO2 99%   BMI 26.33 kg/m²   Physical Exam   Constitutional: She is oriented to person, place, and time. She appears well-developed and well-nourished. She is cooperative. She does not appear ill. No distress. HENT:   Head: Normocephalic. Right Ear: Tympanic membrane, external ear and ear canal normal.   Left Ear: Tympanic membrane, external ear and ear canal normal.   Nose: Nose normal. No mucosal edema or rhinorrhea. Mouth/Throat: Oropharynx is clear and moist and mucous membranes are normal.   Eyes: Pupils are equal, round, and reactive to light. Conjunctivae and EOM are normal. Right eye exhibits no discharge. Left eye exhibits no discharge.  No scleral Continue metformin 1000 mg b.i.d. and Lantus 60 units q.h.s. Advised to check sugars b.i.d.: Fasting and 2 hours after her largest meal.  Advised to increase Lantus by 2 units every week until fasting sugars are under 150. Will likely need added mealtime insulin. She should bring her blood sugars with her to her next appointment. Relevant Medications    Dulaglutide (TRULICITY) 3.62 OD/2.6WN SOPN    Other Relevant Orders    POCT glycosylated hemoglobin (Hb A1C)    Hemoglobin A1C    Comprehensive Metabolic Panel    Hemoglobin A1C    Microalbumin / Creatinine Urine Ratio    Essential hypertension     Under fair control. On valsartan 320 mg q. day with amlodipine 2.5 mg q. day. Relevant Orders    Comprehensive Metabolic Panel    CBC Auto Differential    Comprehensive Metabolic Panel    Hyperlipidemia LDL goal <70     Treated with Crestor 10 mg q. day. Is overdue for labs but she is not fasting today. We'll check lipid panel prior to her return in July. Relevant Orders    Comprehensive Metabolic Panel    Lipid Panel    Expressive aphasia     Patient had an episode in late March 2019 consisting of significant headache with expressive aphasia. She states this episode lasted about 10 minutes. She states it was different from her typical migraines are not her typical migraines are typically right-sided. While this certainly could've been an atypical migraine, she does have risk factors for stroke including hypertension, hyperlipidemia, and uncontrolled diabetes. Check MRI of her head. Advised if she has recurrent similar symptoms she should go to the emergency department. Relevant Orders    MRI Brain WO Contrast    Migraine without aura and without status migrainosus, not intractable     Typically right-sided with associated expressive aphasia. Infrequent. Age-related osteoporosis without current pathological fracture     Last DEXA scan was in June 2018.   She started temporally injections with Dr. Fouzia Flores in December 2018. Current Outpatient Medications   Medication Sig Dispense Refill    blood glucose monitor kit and supplies Test BID times a day & as needed for symptoms of irregular blood glucose. 1 kit 0    blood glucose monitor strips Test BID  times a day & as needed for symptoms of irregular blood glucose. 100 strip 5    Lancets MISC 1 each by Does not apply route 2 times daily 100 each 5    Dulaglutide (TRULICITY) 3.16 LB/9.4SX SOPN Inject 0.75 mg into the skin once a week 4 pen 1    insulin glargine (LANTUS SOLOSTAR) 100 UNIT/ML injection pen Inject 60 Units into the skin nightly 5 pen 2    rosuvastatin (CRESTOR) 10 MG tablet TAKE 1 TABLET EVERY OTHER DAY 45 tablet 3    amLODIPine (NORVASC) 2.5 MG tablet TAKE 1 TABLET DAILY 90 tablet 3    Insulin Pen Needle (B-D ULTRAFINE III SHORT PEN) 31G X 8 MM MISC USE ONCE DAILY 90 each 3    metFORMIN (GLUCOPHAGE) 1000 MG tablet TAKE 1 TABLET TWICE A DAY WITH MEALS 180 tablet 3    diphenhydrAMINE (BENADRYL) 25 MG capsule Take 25 mg by mouth every 6 hours as needed for Itching      vitamin C (ASCORBIC ACID) 500 MG tablet Take 500 mg by mouth daily      CALCIUM PO Take by mouth 2 times daily      Denosumab (PROLIA SC) Inject into the skin every 6 months      anastrozole (ARIMIDEX) 1 MG tablet Take 1 mg by mouth nightly      valsartan (DIOVAN) 320 MG tablet Take 320 mg by mouth nightly       Simethicone 180 MG CAPS Take by mouth 2 times daily as needed      Cholecalciferol (VITAMIN D3) 2000 units CAPS Take by mouth daily       RaNITidine HCl (ZANTAC 150 MAXIMUM STRENGTH PO) Take 150 mg by mouth 2 times daily (before meals)      Probiotic Product (PROBIOTIC DAILY PO) Take by mouth daily       Lancet Devices (SOFT TOUCH LANCET DEVICE) MISC by Does not apply route daily. No current facility-administered medications for this visit.         Return in about 3 months (around 7/26/2019) for labs

## 2019-04-26 NOTE — PATIENT INSTRUCTIONS
Trulicity is once weekly, it does not matter what day of the week. Mornings are the best time to take it. This will replace Januvia. Check blood sugars twice daily: prior to breakfast and 2 hours after largest meal;  and prn feeling \"bad\". Normal blood sugar (non-diabetic) is under 100 before breakfast and under 120 after meal, goal blood sugar with diabetes is under 150 (ideal under 120) before breakfast and after meals. If your morning blood sugars are not running under 150, please increase your Lantus by 2 units every week until your fasting sugars are under 150.

## 2019-04-26 NOTE — ASSESSMENT & PLAN NOTE
Treated with Crestor 10 mg q. day. Is overdue for labs but she is not fasting today. We'll check lipid panel prior to her return in July.

## 2019-04-26 NOTE — ASSESSMENT & PLAN NOTE
Last DEXA scan was in June 2018. She started temporally injections with Dr. Tamiko Wade in December 2018.

## 2019-04-26 NOTE — ASSESSMENT & PLAN NOTE
Patient had an episode in late March 2019 consisting of significant headache with expressive aphasia. She states this episode lasted about 10 minutes. She states it was different from her typical migraines are not her typical migraines are typically right-sided. While this certainly could've been an atypical migraine, she does have risk factors for stroke including hypertension, hyperlipidemia, and uncontrolled diabetes. Check MRI of her head. Advised if she has recurrent similar symptoms she should go to the emergency department.

## 2019-04-27 LAB
ESTIMATED AVERAGE GLUCOSE: 257.5 MG/DL
HBA1C MFR BLD: 10.6 %

## 2019-05-11 ENCOUNTER — HOSPITAL ENCOUNTER (OUTPATIENT)
Dept: MRI IMAGING | Age: 59
Discharge: HOME | End: 2019-05-11
Payer: COMMERCIAL

## 2019-05-11 DIAGNOSIS — R47.01 EXPRESSIVE APHASIA: ICD-10-CM

## 2019-05-11 PROCEDURE — 70551 MRI BRAIN STEM W/O DYE: CPT

## 2019-05-16 ENCOUNTER — TELEPHONE (OUTPATIENT)
Dept: RHEUMATOLOGY | Age: 59
End: 2019-05-16

## 2019-06-14 DIAGNOSIS — E11.9 TYPE 2 DIABETES MELLITUS WITHOUT COMPLICATION, WITHOUT LONG-TERM CURRENT USE OF INSULIN (HCC): ICD-10-CM

## 2019-06-14 RX ORDER — DULAGLUTIDE 0.75 MG/.5ML
INJECTION, SOLUTION SUBCUTANEOUS
Qty: 4 PEN | Refills: 0 | Status: SHIPPED | OUTPATIENT
Start: 2019-06-14 | End: 2019-07-26 | Stop reason: DRUGHIGH

## 2019-06-17 NOTE — PROGRESS NOTES
PCP:  Medical Oncology: [facility]  Radiation:  Other: Katherine Phillips        pT1cN0  STAGE:  IA left breast cancer      Ms. Venice Clarke is a 60y.o.-year-old woman who initially presented to me with  left breast cancer. Since her postoperative visit Ms. Chopra has been doing quite well. She did unfortunately have to have her expander removed due to an infection. She is interested in other reconstructive options. She has no new complaints today. INTERVAL HISTORY:    On 4/18/2018 she underwent a left mastectomy with sentinel lymph node biopsy. Pathology identified 1.3 cm of grade 2 invasive ductal carcinoma with grade 3 DCIS. ER positive WA negative HER-2 negative (repeat testing). Margins were negative. There were 0/4 lymph nodes involved carcinoma. On 3/29/2019 she underwent screening right breast mammogram. There are no concerning findings suggestive of malignancy. BI-RADS 1. Exam:  The physical exam has been reviewed and updated where needed. General: no acute distress  Breast:  The patient was examined in the upright and supine position. There is a well healed scar on the  left breast. There is redundant tissue but no sign of infection. There are expected  post surgical changes. She has good range of motion with her arm. Her contralateral breast shows no new masses or changes in breast contour. There were no skin changes of the breast or nipple areolar complex. There was no nipple inversion or discharge. Respiratory: respirations are non-labored and there is no audible distress  Cardiovascular: regular rate, extremities appear well perfused  Neurologic: alert, oriented      Assessment/Plan:  pT1cN0  STAGE:  IA left breast cancer  ER positive WA negative HER 2 negative (repeat testing)  S/p left SS mastectomy with SLNB and TE recon  S/p left breast infection and removal of expander    Taking Arimidex    There are no current signs of recurrence. Signs/symptoms of recurrence were reviewed.  She verbalizes understanding that she should notify our office if she identifies any abnormalities on self evaluation as it may require further workup. I encouraged her to continue self breast evaluation. At this time she is not interested in further reconstruction but may reconsider in the future. She currently uses a prosthesis. Follow up surveillance was discussed. Our plan at this time is to follow up with surgical breast oncology office in 6 months for a clinical breast exam. Her right screening mammogram is due in March 2020. All of the patient's questions were answered at this time however, she was encouraged to call the office with any further inquiries. Approximately 15 minutes of time were spent in this visit of which 50% or more of the time was related to coordination of care.

## 2019-06-19 ENCOUNTER — TELEPHONE (OUTPATIENT)
Dept: FAMILY MEDICINE CLINIC | Age: 59
End: 2019-06-19

## 2019-06-20 ENCOUNTER — OFFICE VISIT (OUTPATIENT)
Dept: SURGERY | Age: 59
End: 2019-06-20
Payer: COMMERCIAL

## 2019-06-20 VITALS
WEIGHT: 147 LBS | OXYGEN SATURATION: 99 % | DIASTOLIC BLOOD PRESSURE: 80 MMHG | BODY MASS INDEX: 25.63 KG/M2 | SYSTOLIC BLOOD PRESSURE: 141 MMHG | HEART RATE: 88 BPM

## 2019-06-20 DIAGNOSIS — Z85.3 PERSONAL HISTORY OF BREAST CANCER: Primary | ICD-10-CM

## 2019-06-20 DIAGNOSIS — Z85.3 ENCOUNTER FOR FOLLOW-UP SURVEILLANCE OF BREAST CANCER: ICD-10-CM

## 2019-06-20 DIAGNOSIS — Z08 ENCOUNTER FOR FOLLOW-UP SURVEILLANCE OF BREAST CANCER: ICD-10-CM

## 2019-06-20 PROCEDURE — 99213 OFFICE O/P EST LOW 20 MIN: CPT | Performed by: SURGERY

## 2019-07-19 ENCOUNTER — TELEPHONE (OUTPATIENT)
Dept: INTERNAL MEDICINE CLINIC | Age: 59
End: 2019-07-19

## 2019-07-19 DIAGNOSIS — E11.9 TYPE 2 DIABETES MELLITUS WITHOUT COMPLICATION, WITHOUT LONG-TERM CURRENT USE OF INSULIN (HCC): ICD-10-CM

## 2019-07-19 DIAGNOSIS — I10 ESSENTIAL HYPERTENSION: ICD-10-CM

## 2019-07-19 DIAGNOSIS — E78.5 HYPERLIPIDEMIA LDL GOAL <70: ICD-10-CM

## 2019-07-19 LAB
A/G RATIO: 2 (ref 1.1–2.2)
ALBUMIN SERPL-MCNC: 4.6 G/DL (ref 3.4–5)
ALP BLD-CCNC: 113 U/L (ref 40–129)
ALT SERPL-CCNC: 20 U/L (ref 10–40)
ANION GAP SERPL CALCULATED.3IONS-SCNC: 14 MMOL/L (ref 3–16)
AST SERPL-CCNC: 26 U/L (ref 15–37)
BILIRUB SERPL-MCNC: 0.6 MG/DL (ref 0–1)
BUN BLDV-MCNC: 6 MG/DL (ref 7–20)
CALCIUM SERPL-MCNC: 9.6 MG/DL (ref 8.3–10.6)
CHLORIDE BLD-SCNC: 101 MMOL/L (ref 99–110)
CHOLESTEROL, TOTAL: 136 MG/DL (ref 0–199)
CO2: 24 MMOL/L (ref 21–32)
CREAT SERPL-MCNC: 0.5 MG/DL (ref 0.6–1.1)
CREATININE URINE: 28 MG/DL (ref 28–259)
GFR AFRICAN AMERICAN: >60
GFR NON-AFRICAN AMERICAN: >60
GLOBULIN: 2.3 G/DL
GLUCOSE BLD-MCNC: 249 MG/DL (ref 70–99)
HDLC SERPL-MCNC: 32 MG/DL (ref 40–60)
LDL CHOLESTEROL CALCULATED: ABNORMAL MG/DL
LDL CHOLESTEROL DIRECT: 54 MG/DL
MICROALBUMIN UR-MCNC: 1.9 MG/DL
MICROALBUMIN/CREAT UR-RTO: 67.9 MG/G (ref 0–30)
POTASSIUM SERPL-SCNC: 4.2 MMOL/L (ref 3.5–5.1)
SODIUM BLD-SCNC: 139 MMOL/L (ref 136–145)
TOTAL PROTEIN: 6.9 G/DL (ref 6.4–8.2)
TRIGL SERPL-MCNC: 472 MG/DL (ref 0–150)
VLDLC SERPL CALC-MCNC: ABNORMAL MG/DL

## 2019-07-20 LAB
ESTIMATED AVERAGE GLUCOSE: 217.3 MG/DL
HBA1C MFR BLD: 9.2 %

## 2019-07-23 ENCOUNTER — TELEPHONE (OUTPATIENT)
Dept: INTERNAL MEDICINE CLINIC | Age: 59
End: 2019-07-23

## 2019-07-26 ENCOUNTER — TELEPHONE (OUTPATIENT)
Dept: INTERNAL MEDICINE CLINIC | Age: 59
End: 2019-07-26

## 2019-07-26 ENCOUNTER — OFFICE VISIT (OUTPATIENT)
Dept: INTERNAL MEDICINE CLINIC | Age: 59
End: 2019-07-26
Payer: MEDICAID

## 2019-07-26 VITALS
HEART RATE: 87 BPM | DIASTOLIC BLOOD PRESSURE: 78 MMHG | WEIGHT: 148 LBS | BODY MASS INDEX: 25.81 KG/M2 | SYSTOLIC BLOOD PRESSURE: 140 MMHG | OXYGEN SATURATION: 98 %

## 2019-07-26 DIAGNOSIS — E11.9 TYPE 2 DIABETES MELLITUS WITHOUT COMPLICATION, UNSPECIFIED WHETHER LONG TERM INSULIN USE (HCC): ICD-10-CM

## 2019-07-26 DIAGNOSIS — E11.9 TYPE 2 DIABETES MELLITUS WITHOUT COMPLICATION, WITHOUT LONG-TERM CURRENT USE OF INSULIN (HCC): ICD-10-CM

## 2019-07-26 DIAGNOSIS — I10 ESSENTIAL HYPERTENSION: ICD-10-CM

## 2019-07-26 DIAGNOSIS — E78.5 HYPERLIPIDEMIA LDL GOAL <70: Primary | ICD-10-CM

## 2019-07-26 PROCEDURE — 99214 OFFICE O/P EST MOD 30 MIN: CPT | Performed by: INTERNAL MEDICINE

## 2019-07-26 RX ORDER — ROSUVASTATIN CALCIUM 10 MG/1
10 TABLET, COATED ORAL DAILY
Qty: 30 TABLET | Refills: 5 | Status: SHIPPED | OUTPATIENT
Start: 2019-07-26 | End: 2019-12-16 | Stop reason: SDUPTHER

## 2019-07-26 RX ORDER — AMLODIPINE BESYLATE 2.5 MG/1
2.5 TABLET ORAL DAILY
Qty: 30 TABLET | Refills: 5 | Status: SHIPPED | OUTPATIENT
Start: 2019-07-26 | End: 2019-10-28 | Stop reason: ALTCHOICE

## 2019-07-26 RX ORDER — VALSARTAN 320 MG/1
320 TABLET ORAL NIGHTLY
Qty: 30 TABLET | Refills: 5 | Status: SHIPPED | OUTPATIENT
Start: 2019-07-26 | End: 2020-04-08 | Stop reason: SDUPTHER

## 2019-07-26 ASSESSMENT — ENCOUNTER SYMPTOMS
ABDOMINAL PAIN: 0
SHORTNESS OF BREATH: 0

## 2019-07-26 NOTE — PROGRESS NOTES
Right Foot:   Protective Sensation: 10 sites tested. 10 sites sensed. Skin Integrity: Negative for ulcer, blister, skin breakdown, erythema, warmth, callus or dry skin. Left Foot:   Protective Sensation: 10 sites tested. 10 sites sensed. Skin Integrity: Negative for ulcer, blister, skin breakdown, erythema, warmth, callus or dry skin. Lymphadenopathy:     She has no cervical adenopathy. Neurological: She is alert and oriented to person, place, and time. Skin: Skin is warm and dry. No pallor. Psychiatric: She has a normal mood and affect. Her behavior is normal. Judgment and thought content normal.       ASSESSMENT/PLAN:    Problem List Items Addressed This Visit     Essential hypertension     Increase valsartan to 320 mg daily. Hold amlodipine 2.5 mg daily. Monitor blood pressure at home. If not at goal, resume amlodipine. Relevant Medications    rosuvastatin (CRESTOR) 10 MG tablet    amLODIPine (NORVASC) 2.5 MG tablet    valsartan (DIOVAN) 320 MG tablet    Hyperlipidemia LDL goal <70 - Primary     Change Crestor from 10 mg every other day to 10 mg daily. Elevated triglycerides likely reflect poor blood sugar control but if they do remain elevated may need to add fenofibrate. Relevant Medications    rosuvastatin (CRESTOR) 10 MG tablet    amLODIPine (NORVASC) 2.5 MG tablet    valsartan (DIOVAN) 320 MG tablet    Other Relevant Orders    Lipid Panel    TSH with Reflex    Type 2 diabetes mellitus without complication, without long-term current use of insulin (HCC)     Improved but still above goal with hemoglobin A1c of 9.2%. Increase Trulicity from 8.88 to 1.5 mg weekly. Continue Lantus 60 units nightly and metformin 1000 mg twice daily. Discussed may need to add mealtime sugar. Will hold off on adding short acting insulin until her insurance situation gets resolved. She is applying for patient assistance for Lantus and Trulicity.          Relevant Medications    Dulaglutide

## 2019-07-26 NOTE — PATIENT INSTRUCTIONS
test?  The test result is usually given as a percentage. The normal A1c is less than 5.7%. You have a higher risk for diabetes if your A1c is 5.7% to 6.4%. If your level is 6.5% or higher, you have diabetes. The A1c test result also can be used to find your estimated average glucose, or eAG. Your eAG and A1c show the same thing in two different ways. They both help you learn more about your average blood sugar range over the past 2 to 3 months. A1c is shown as a percentage, while eAG uses the same units (mg/dl) as your glucose meter. Examples:  · 6% A1c = 126 mg/dL  · 7% A1c = 154 mg/dL  · 8% A1c = 183 mg/dL  · 9% A1c = 212 mg/dL  · 10% A1c = 240 mg/dL  · 11% A1c = 269 mg/dL  · 12% A1c = 298 mg/dL  Where can you learn more? Go to [email or web address] org and sign in to your [employer] account. Enter U216 in the [employer] box to learn more about \"Hemoglobin A1c: About This Test.\"     If you do not have an account, please click on the \"Sign Up Now\" link. Current as of: July 25, 2018  Content Version: 12.0  © 1245-5480 [employer]wise, Incorporated. Care instructions adapted under license by [facility] ([facility]). If you have questions about a medical condition or this instruction, always ask your healthcare professional. [place] [address] any warranty or liability for your use of this information.

## 2019-07-30 ENCOUNTER — TELEPHONE (OUTPATIENT)
Dept: INTERNAL MEDICINE CLINIC | Age: 59
End: 2019-07-30

## 2019-07-30 NOTE — TELEPHONE ENCOUNTER
Pt said [employer] has not received the form that was completed last week.  It should be faxed to [phone]

## 2019-07-31 ENCOUNTER — TELEPHONE (OUTPATIENT)
Dept: INTERNAL MEDICINE CLINIC | Age: 59
End: 2019-07-31

## 2019-07-31 NOTE — TELEPHONE ENCOUNTER
Pt called and said that she will not able to afford the new med trulicity that dr Gregoria Andre prescribed     She said even with good rx and coupon,  It would still be $800 a month  Would like to discuss another option

## 2019-08-01 ENCOUNTER — TELEPHONE (OUTPATIENT)
Dept: INTERNAL MEDICINE CLINIC | Age: 59
End: 2019-08-01

## 2019-08-01 NOTE — TELEPHONE ENCOUNTER
Patient states she spoke with Al[employer] Nugent. They have not received the paperwork yet to approve her for Medicaid. They told her it could take 24-48 hours to receive forms and up to 30 days for a decision. Just wanting to let  know.

## 2019-08-12 ENCOUNTER — TELEPHONE (OUTPATIENT)
Dept: INTERNAL MEDICINE CLINIC | Age: 59
End: 2019-08-12

## 2019-08-22 ENCOUNTER — TELEPHONE (OUTPATIENT)
Dept: INTERNAL MEDICINE CLINIC | Age: 59
End: 2019-08-22

## 2019-08-27 ENCOUNTER — TELEPHONE (OUTPATIENT)
Dept: INTERNAL MEDICINE CLINIC | Age: 59
End: 2019-08-27

## 2019-08-27 ENCOUNTER — TELEPHONE (OUTPATIENT)
Dept: RHEUMATOLOGY | Age: 59
End: 2019-08-27

## 2019-08-28 ENCOUNTER — TELEPHONE (OUTPATIENT)
Dept: INTERNAL MEDICINE CLINIC | Age: 59
End: 2019-08-28

## 2019-08-28 NOTE — TELEPHONE ENCOUNTER
Pt stopped by the office, Stated she needed a refill for her Lantus sent to [facility] on [address]. She stated her insurance company is saying they sent another request for authorization for the Trulicity or a possible replacement for it. Please give her a call when it goes through. Thank you.

## 2019-08-29 ENCOUNTER — TELEPHONE (OUTPATIENT)
Dept: RHEUMATOLOGY | Age: 59
End: 2019-08-29

## 2019-08-29 DIAGNOSIS — E11.9 TYPE 2 DIABETES MELLITUS WITHOUT COMPLICATION, WITHOUT LONG-TERM CURRENT USE OF INSULIN (HCC): Primary | ICD-10-CM

## 2019-09-05 ENCOUNTER — TELEPHONE (OUTPATIENT)
Dept: ORTHOPEDIC SURGERY | Age: 59
End: 2019-09-05

## 2019-09-19 NOTE — TELEPHONE ENCOUNTER
Per [address] [place] [address] this was changed to [facility] 8/2019.

## 2019-10-28 ENCOUNTER — OFFICE VISIT (OUTPATIENT)
Dept: INTERNAL MEDICINE CLINIC | Age: 59
End: 2019-10-28
Payer: COMMERCIAL

## 2019-10-28 VITALS
WEIGHT: 149.2 LBS | BODY MASS INDEX: 26.02 KG/M2 | HEART RATE: 88 BPM | SYSTOLIC BLOOD PRESSURE: 150 MMHG | OXYGEN SATURATION: 97 % | DIASTOLIC BLOOD PRESSURE: 80 MMHG
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
## 2019-10-28 DIAGNOSIS — Z11.59 ENCOUNTER FOR HEPATITIS C SCREENING TEST FOR LOW RISK PATIENT: ICD-10-CM

## 2019-10-28 DIAGNOSIS — E78.5 HYPERLIPIDEMIA LDL GOAL <70: ICD-10-CM

## 2019-10-28 DIAGNOSIS — Z11.4 SCREENING FOR HIV (HUMAN IMMUNODEFICIENCY VIRUS): ICD-10-CM

## 2019-10-28 DIAGNOSIS — I10 ESSENTIAL HYPERTENSION: ICD-10-CM

## 2019-10-28 DIAGNOSIS — E11.9 TYPE 2 DIABETES MELLITUS WITHOUT COMPLICATION, WITHOUT LONG-TERM CURRENT USE OF INSULIN (HCC): Primary | ICD-10-CM

## 2019-10-28 DIAGNOSIS — R14.0 ABDOMINAL BLOATING: ICD-10-CM

## 2019-10-28 LAB — HBA1C MFR BLD: 9.4 %

## 2019-10-28 PROCEDURE — G8419 CALC BMI OUT NRM PARAM NOF/U: HCPCS | Performed by: INTERNAL MEDICINE

## 2019-10-28 PROCEDURE — 2022F DILAT RTA XM EVC RTNOPTHY: CPT | Performed by: INTERNAL MEDICINE

## 2019-10-28 PROCEDURE — 99214 OFFICE O/P EST MOD 30 MIN: CPT | Performed by: INTERNAL MEDICINE

## 2019-10-28 PROCEDURE — 1036F TOBACCO NON-USER: CPT | Performed by: INTERNAL MEDICINE

## 2019-10-28 PROCEDURE — G8484 FLU IMMUNIZE NO ADMIN: HCPCS | Performed by: INTERNAL MEDICINE

## 2019-10-28 PROCEDURE — G8427 DOCREV CUR MEDS BY ELIG CLIN: HCPCS | Performed by: INTERNAL MEDICINE

## 2019-10-28 PROCEDURE — 3017F COLORECTAL CA SCREEN DOC REV: CPT | Performed by: INTERNAL MEDICINE

## 2019-10-28 PROCEDURE — 3046F HEMOGLOBIN A1C LEVEL >9.0%: CPT | Performed by: INTERNAL MEDICINE

## 2019-10-28 PROCEDURE — 83036 HEMOGLOBIN GLYCOSYLATED A1C: CPT | Performed by: INTERNAL MEDICINE

## 2019-10-28 RX ORDER — OMEPRAZOLE 40 MG/1
40 CAPSULE, DELAYED RELEASE ORAL
Qty: 30 CAPSULE | Refills: 5 | Status: SHIPPED | OUTPATIENT
Start: 2019-10-28 | End: 2020-02-19 | Stop reason: SDUPTHER

## 2019-10-28 RX ORDER — MUPIROCIN CALCIUM 20 MG/G
CREAM TOPICAL
Qty: 30 G | Refills: 1 | Status: SHIPPED | OUTPATIENT
Start: 2019-10-28 | End: 2019-11-27

## 2019-10-28 RX ORDER — METFORMIN HYDROCHLORIDE 750 MG/1
1500 TABLET, EXTENDED RELEASE ORAL
Qty: 60 TABLET | Refills: 3 | Status: SHIPPED | OUTPATIENT
Start: 2019-10-28 | End: 2020-02-19 | Stop reason: SDUPTHER

## 2019-10-28 ASSESSMENT — ENCOUNTER SYMPTOMS
CHEST TIGHTNESS: 0
DIARRHEA: 0
CONSTIPATION: 0
SHORTNESS OF BREATH: 0
ABDOMINAL PAIN: 0

## 2019-12-02 RX ORDER — INSULIN GLARGINE 100 [IU]/ML
INJECTION, SOLUTION SUBCUTANEOUS
Qty: 15 ML | Refills: 0 | Status: SHIPPED | OUTPATIENT
Start: 2019-12-02 | End: 2019-12-30

## 2019-12-06 ENCOUNTER — OFFICE VISIT (OUTPATIENT)
Dept: SURGERY | Age: 59
End: 2019-12-06
Payer: COMMERCIAL

## 2019-12-06 VITALS
BODY MASS INDEX: 25.34 KG/M2 | WEIGHT: 143 LBS | SYSTOLIC BLOOD PRESSURE: 151 MMHG | HEART RATE: 86 BPM | DIASTOLIC BLOOD PRESSURE: 76 MMHG | HEIGHT: 63 IN | TEMPERATURE: 97.1 F

## 2019-12-06 DIAGNOSIS — Z12.31 SCREENING MAMMOGRAM, ENCOUNTER FOR: Primary | ICD-10-CM

## 2019-12-06 DIAGNOSIS — Z85.3 ENCOUNTER FOR FOLLOW-UP SURVEILLANCE OF BREAST CANCER: ICD-10-CM

## 2019-12-06 DIAGNOSIS — Z85.3 PERSONAL HISTORY OF BREAST CANCER: ICD-10-CM

## 2019-12-06 DIAGNOSIS — Z12.39 SCREENING BREAST EXAMINATION: ICD-10-CM

## 2019-12-06 DIAGNOSIS — N63.21 LUMP IN UPPER OUTER QUADRANT OF LEFT BREAST: ICD-10-CM

## 2019-12-06 DIAGNOSIS — Z08 ENCOUNTER FOR FOLLOW-UP SURVEILLANCE OF BREAST CANCER: ICD-10-CM

## 2019-12-06 PROCEDURE — G8484 FLU IMMUNIZE NO ADMIN: HCPCS | Performed by: SURGERY

## 2019-12-06 PROCEDURE — G8419 CALC BMI OUT NRM PARAM NOF/U: HCPCS | Performed by: SURGERY

## 2019-12-06 PROCEDURE — 3017F COLORECTAL CA SCREEN DOC REV: CPT | Performed by: SURGERY

## 2019-12-06 PROCEDURE — 99213 OFFICE O/P EST LOW 20 MIN: CPT | Performed by: SURGERY

## 2019-12-06 PROCEDURE — G8427 DOCREV CUR MEDS BY ELIG CLIN: HCPCS | Performed by: SURGERY

## 2019-12-06 PROCEDURE — 1036F TOBACCO NON-USER: CPT | Performed by: SURGERY

## 2019-12-16 ENCOUNTER — TELEPHONE (OUTPATIENT)
Dept: INTERNAL MEDICINE CLINIC | Age: 59
End: 2019-12-16

## 2019-12-16 RX ORDER — ROSUVASTATIN CALCIUM 10 MG/1
10 TABLET, COATED ORAL DAILY
Qty: 90 TABLET | Refills: 3 | Status: SHIPPED | OUTPATIENT
Start: 2019-12-16 | End: 2019-12-16 | Stop reason: SDUPTHER

## 2019-12-16 RX ORDER — ROSUVASTATIN CALCIUM 10 MG/1
10 TABLET, COATED ORAL DAILY
Qty: 90 TABLET | Refills: 3 | Status: SHIPPED | OUTPATIENT
Start: 2019-12-16 | End: 2020-02-12 | Stop reason: SDUPTHER

## 2019-12-26 ENCOUNTER — HOSPITAL ENCOUNTER (OUTPATIENT)
Dept: WOMENS IMAGING | Age: 59
End: 2019-12-26
Payer: COMMERCIAL

## 2019-12-26 ENCOUNTER — HOSPITAL ENCOUNTER (OUTPATIENT)
Dept: ULTRASOUND IMAGING | Age: 59
Discharge: HOME | End: 2019-12-26
Payer: COMMERCIAL

## 2019-12-26 DIAGNOSIS — N63.21 LUMP IN UPPER OUTER QUADRANT OF LEFT BREAST: ICD-10-CM

## 2019-12-26 PROCEDURE — 76642 ULTRASOUND BREAST LIMITED: CPT

## 2019-12-30 RX ORDER — INSULIN GLARGINE 100 [IU]/ML
INJECTION, SOLUTION SUBCUTANEOUS
Qty: 15 ML | Refills: 0 | Status: SHIPPED | OUTPATIENT
Start: 2019-12-30 | End: 2020-01-22

## 2020-01-14 ENCOUNTER — TELEPHONE (OUTPATIENT)
Dept: ADMINISTRATIVE | Age: 60
End: 2020-01-14

## 2020-01-16 NOTE — TELEPHONE ENCOUNTER
Pt stated that the short pen needles are out of stock at [address].   She is requesting to have needles sent to [facility] [address]

## 2020-01-27 ENCOUNTER — OFFICE VISIT (OUTPATIENT)
Dept: INTERNAL MEDICINE CLINIC | Age: 60
End: 2020-01-27
Payer: COMMERCIAL

## 2020-01-27 VITALS
BODY MASS INDEX: 26.04 KG/M2 | DIASTOLIC BLOOD PRESSURE: 62 MMHG | OXYGEN SATURATION: 97 % | HEART RATE: 92 BPM | WEIGHT: 147 LBS | SYSTOLIC BLOOD PRESSURE: 128 MMHG

## 2020-01-27 DIAGNOSIS — E11.9 TYPE 2 DIABETES MELLITUS WITHOUT COMPLICATION, WITHOUT LONG-TERM CURRENT USE OF INSULIN (HCC): ICD-10-CM

## 2020-01-27 LAB
A/G RATIO: 1.8 (ref 1.1–2.2)
ALBUMIN SERPL-MCNC: 4.7 G/DL (ref 3.4–5)
ALP BLD-CCNC: 81 U/L (ref 40–129)
ALT SERPL-CCNC: 19 U/L (ref 10–40)
ANION GAP SERPL CALCULATED.3IONS-SCNC: 16 MMOL/L (ref 3–16)
AST SERPL-CCNC: 24 U/L (ref 15–37)
BILIRUB SERPL-MCNC: 0.8 MG/DL (ref 0–1)
BUN BLDV-MCNC: 11 MG/DL (ref 7–20)
CALCIUM SERPL-MCNC: 9.7 MG/DL (ref 8.3–10.6)
CHLORIDE BLD-SCNC: 102 MMOL/L (ref 99–110)
CO2: 25 MMOL/L (ref 21–32)
CREAT SERPL-MCNC: 0.6 MG/DL (ref 0.6–1.2)
CREATININE URINE: 38.7 MG/DL (ref 28–259)
ESTIMATED AVERAGE GLUCOSE: 162.8 MG/DL
GFR AFRICAN AMERICAN: >60
GFR NON-AFRICAN AMERICAN: >60
GLOBULIN: 2.6 G/DL
GLUCOSE BLD-MCNC: 126 MG/DL (ref 70–99)
HBA1C MFR BLD: 7.3 %
MICROALBUMIN UR-MCNC: 3.4 MG/DL
MICROALBUMIN/CREAT UR-RTO: 87.9 MG/G (ref 0–30)
POTASSIUM SERPL-SCNC: 4.3 MMOL/L (ref 3.5–5.1)
SODIUM BLD-SCNC: 143 MMOL/L (ref 136–145)
TOTAL PROTEIN: 7.3 G/DL (ref 6.4–8.2)

## 2020-01-27 PROCEDURE — 99214 OFFICE O/P EST MOD 30 MIN: CPT | Performed by: INTERNAL MEDICINE

## 2020-01-27 PROCEDURE — 1036F TOBACCO NON-USER: CPT | Performed by: INTERNAL MEDICINE

## 2020-01-27 PROCEDURE — 2022F DILAT RTA XM EVC RTNOPTHY: CPT | Performed by: INTERNAL MEDICINE

## 2020-01-27 PROCEDURE — 3017F COLORECTAL CA SCREEN DOC REV: CPT | Performed by: INTERNAL MEDICINE

## 2020-01-27 PROCEDURE — G8419 CALC BMI OUT NRM PARAM NOF/U: HCPCS | Performed by: INTERNAL MEDICINE

## 2020-01-27 PROCEDURE — G8484 FLU IMMUNIZE NO ADMIN: HCPCS | Performed by: INTERNAL MEDICINE

## 2020-01-27 PROCEDURE — G8427 DOCREV CUR MEDS BY ELIG CLIN: HCPCS | Performed by: INTERNAL MEDICINE

## 2020-01-27 SDOH — ECONOMIC STABILITY: FOOD INSECURITY: WITHIN THE PAST 12 MONTHS, YOU WORRIED THAT YOUR FOOD WOULD RUN OUT BEFORE YOU GOT MONEY TO BUY MORE.: NEVER TRUE

## 2020-01-27 SDOH — ECONOMIC STABILITY: TRANSPORTATION INSECURITY
IN THE PAST 12 MONTHS, HAS LACK OF TRANSPORTATION KEPT YOU FROM MEETINGS, WORK, OR FROM GETTING THINGS NEEDED FOR DAILY LIVING?: NO

## 2020-01-27 SDOH — ECONOMIC STABILITY: FOOD INSECURITY: WITHIN THE PAST 12 MONTHS, THE FOOD YOU BOUGHT JUST DIDN'T LAST AND YOU DIDN'T HAVE MONEY TO GET MORE.: NEVER TRUE

## 2020-01-27 SDOH — ECONOMIC STABILITY: TRANSPORTATION INSECURITY
IN THE PAST 12 MONTHS, HAS THE LACK OF TRANSPORTATION KEPT YOU FROM MEDICAL APPOINTMENTS OR FROM GETTING MEDICATIONS?: NO

## 2020-01-27 SDOH — ECONOMIC STABILITY: INCOME INSECURITY: HOW HARD IS IT FOR YOU TO PAY FOR THE VERY BASICS LIKE FOOD, HOUSING, MEDICAL CARE, AND HEATING?: NOT HARD AT ALL

## 2020-01-27 ASSESSMENT — ENCOUNTER SYMPTOMS
SHORTNESS OF BREATH: 0
CHEST TIGHTNESS: 0
ABDOMINAL PAIN: 0
DIARRHEA: 0
CONSTIPATION: 0

## 2020-01-27 ASSESSMENT — PATIENT HEALTH QUESTIONNAIRE - PHQ9
SUM OF ALL RESPONSES TO PHQ QUESTIONS 1-9: 0
SUM OF ALL RESPONSES TO PHQ9 QUESTIONS 1 & 2: 0
SUM OF ALL RESPONSES TO PHQ QUESTIONS 1-9: 0
1. LITTLE INTEREST OR PLEASURE IN DOING THINGS: 0
2. FEELING DOWN, DEPRESSED OR HOPELESS: 0

## 2020-01-27 NOTE — PROGRESS NOTES
Patient: Narciso Cope is a 60 y.o. female who presents today with the following Chief Complaint(s):  Chief Complaint   Patient presents with    Check-Up       HPI     Here today for follow up. Is trying to apply for disability- is working with an  and is trying to use diabetes and breast cancer as reasons for disability. Is worried that her ears are clogged as she has not had them cleaned in a while. Has not noticed any changes in her hearing but her mom as needed to repeat herself several times. DM- has not been checking her sugars. Just had labs done this morning so not available. No difficulty getting DM medications. Needs to change Basaglar to Lantus d/t insurance formulary change. Remains on Basaglar 60 units qhs for the time being. HTN- has been checking BP and have been running in the 130's/70's. HLD- remains on Crestor w/out difficutly. Osteoporosis- still getting Prolia injections with Dr. Cherelle Herr. No side effects. Allergies   Allergen Reactions    Cefdinir Other (See Comments)     *Cephalosporins*: Pruritus.  Erythromycin Swelling     Throat swelling.  Ezetimibe-Simvastatin Other (See Comments)     Muscle cramps.  Lisinopril Other (See Comments)     Cough.  Niacin And Related Other (See Comments)     Niacin (antihyperlipidemic) *Antihyperlipidmics*: [place]   [employer]uel[employer] Other Other (See Comments)     darvocet    Pcn [Penicillins] Hives    Zithromax [Azithromycin] Swelling     Throat swelling.        Past Medical History:   Diagnosis Date    Allergic rhinitis, cause unspecified     Cancer ([facility] [address]     left breast    Generalized osteoarthrosis, involving multiple sites     Pure hypercholesterolemia     Pure hyperglyceridemia     Type II or unspecified type diabetes mellitus without mention of complication, uncontrolled     Unspecified essential hypertension       Past Surgical History:   Procedure Laterality Date    CHOLECYSTECTOMY, LAPAROSCOPIC N/A sounds: Normal breath sounds. Abdominal:      Palpations: Abdomen is soft. Tenderness: There is no abdominal tenderness. Feet:      Right foot:      Protective Sensation: 10 sites tested. 10 sites sensed. Skin integrity: Skin integrity normal.      Toenail Condition: Right toenails are normal.      Left foot:      Protective Sensation: 10 sites tested. 10 sites sensed. Skin integrity: Skin integrity normal.      Toenail Condition: Left toenails are normal.   Lymphadenopathy:      Cervical: No cervical adenopathy. Neurological:      Mental Status: She is alert. Psychiatric:         Mood and Affect: Mood normal.         Behavior: Behavior normal. Behavior is cooperative. ASSESSMENT/PLAN:    Problem List Items Addressed This Visit     Age-related osteoporosis without current pathological fracture     Remains on Prolia injections per Dr. Nery Watson. Relevant Orders    VITAMIN D 25 HYDROXY    Essential hypertension     Under fair control. Continue valsartan 320 mg qd. Hyperlipidemia LDL goal <70     Labs are pending. Continue Crestor 10 mg qd. Relevant Orders    Lipid Panel    Type 2 diabetes mellitus without complication, without long-term current use of insulin ([facility] [address] - Primary     Labs pending. Continue Trulicity 1.5 mg weekly, metformin ER 1500 mg qd, and Jardiance 25 mg qd, change Basaglar to Lantus d/t insurance (60 units qd).           Relevant Medications    insulin glargine (LANTUS SOLOSTAR) 100 UNIT/ML injection pen    Other Relevant Orders     DIABETES FOOT EXAM (Completed)    Comprehensive Metabolic Panel    Hemoglobin A1C          Current Outpatient Medications   Medication Sig Dispense Refill    insulin glargine (LANTUS SOLOSTAR) 100 UNIT/ML injection pen Inject 60 Units into the skin nightly 10 pen 5    Insulin Pen Needle (B-D ULTRAFINE III SHORT PEN) 31G X 8 MM MISC USE ONCE DAILY 90 each 3    rosuvastatin (CRESTOR) 10 MG tablet Take 1 tablet

## 2020-01-30 NOTE — ASSESSMENT & PLAN NOTE
Labs pending. Continue Trulicity 1.5 mg weekly, metformin ER 1500 mg qd, and Jardiance 25 mg qd, change Basaglar to Lantus d/t insurance (60 units qd).

## 2020-02-12 RX ORDER — ROSUVASTATIN CALCIUM 10 MG/1
10 TABLET, COATED ORAL DAILY
Qty: 90 TABLET | Refills: 3 | Status: SHIPPED | OUTPATIENT
Start: 2020-02-12 | End: 2020-02-13 | Stop reason: SDUPTHER

## 2020-02-12 NOTE — TELEPHONE ENCOUNTER
Med refill  Med: rosuvastatin, Dulaglutide, insulin glargine  Pharmacy: [facility]  Last ov: 1/24/20  Next ov: 4/27/20

## 2020-02-13 ENCOUNTER — TELEPHONE (OUTPATIENT)
Dept: INTERNAL MEDICINE CLINIC | Age: 60
End: 2020-02-13

## 2020-02-13 RX ORDER — ROSUVASTATIN CALCIUM 10 MG/1
10 TABLET, COATED ORAL DAILY
Qty: 90 TABLET | Refills: 3 | Status: SHIPPED | OUTPATIENT
Start: 2020-02-13 | End: 2020-12-14 | Stop reason: SDUPTHER

## 2020-02-14 ENCOUNTER — TELEPHONE (OUTPATIENT)
Dept: INTERNAL MEDICINE CLINIC | Age: 60
End: 2020-02-14

## 2020-02-19 RX ORDER — OMEPRAZOLE 40 MG/1
40 CAPSULE, DELAYED RELEASE ORAL
Qty: 90 CAPSULE | Refills: 3 | Status: SHIPPED | OUTPATIENT
Start: 2020-02-19 | End: 2020-12-14 | Stop reason: SDUPTHER

## 2020-02-19 RX ORDER — METFORMIN HYDROCHLORIDE 750 MG/1
1500 TABLET, EXTENDED RELEASE ORAL
Qty: 60 TABLET | Refills: 3 | Status: SHIPPED | OUTPATIENT
Start: 2020-02-19 | End: 2020-06-15

## 2020-02-19 NOTE — TELEPHONE ENCOUNTER
Patient requesting a medication refill.   Medication: 1. metFORMIN (GLUCOPHAGE-XR) 750 MG extended release tablet   2.omeprazole (PRILOSEC) 40 MG delayed release capsule   3.empagliflozin (JARDIANCE) 25 MG tablet   Pharmacy: [facility] [address] [facility] [phone]   Last office visit: 1/27/20  Next office visit: 4/27/2020

## 2020-02-26 ENCOUNTER — TELEPHONE (OUTPATIENT)
Dept: INTERNAL MEDICINE CLINIC | Age: 60
End: 2020-02-26

## 2020-03-10 ENCOUNTER — TELEPHONE (OUTPATIENT)
Dept: INTERNAL MEDICINE CLINIC | Age: 60
End: 2020-03-10

## 2020-03-10 RX ORDER — PEN NEEDLE, DIABETIC 31 GX5/16"
NEEDLE, DISPOSABLE MISCELLANEOUS
Qty: 90 EACH | Refills: 3 | Status: SHIPPED | OUTPATIENT
Start: 2020-03-10 | End: 2021-04-06

## 2020-04-08 ENCOUNTER — TELEPHONE (OUTPATIENT)
Dept: INTERNAL MEDICINE CLINIC | Age: 60
End: 2020-04-08

## 2020-04-08 RX ORDER — VALSARTAN 320 MG/1
320 TABLET ORAL NIGHTLY
Qty: 30 TABLET | Refills: 5 | Status: SHIPPED | OUTPATIENT
Start: 2020-04-08 | End: 2020-10-12

## 2020-05-07 ENCOUNTER — TELEPHONE (OUTPATIENT)
Dept: INTERNAL MEDICINE CLINIC | Age: 60
End: 2020-05-07

## 2020-05-07 NOTE — TELEPHONE ENCOUNTER
I think that is fine. I am checking her vitamin D level in June and will confirm if this dose is appropriate at this time.  saw

## 2020-06-01 ENCOUNTER — HOSPITAL ENCOUNTER (OUTPATIENT)
Dept: WOMENS IMAGING | Age: 60
Discharge: HOME | End: 2020-06-01
Payer: COMMERCIAL

## 2020-06-01 PROCEDURE — 77063 BREAST TOMOSYNTHESIS BI: CPT

## 2020-06-08 ENCOUNTER — OFFICE VISIT (OUTPATIENT)
Dept: SURGERY | Age: 60
End: 2020-06-08
Payer: COMMERCIAL

## 2020-06-08 VITALS
BODY MASS INDEX: 25.52 KG/M2 | TEMPERATURE: 97.8 F | SYSTOLIC BLOOD PRESSURE: 134 MMHG | WEIGHT: 144 LBS | HEART RATE: 90 BPM | HEIGHT: 63 IN | OXYGEN SATURATION: 98 % | DIASTOLIC BLOOD PRESSURE: 73 MMHG

## 2020-06-08 PROCEDURE — G8427 DOCREV CUR MEDS BY ELIG CLIN: HCPCS | Performed by: SURGERY

## 2020-06-08 PROCEDURE — 99213 OFFICE O/P EST LOW 20 MIN: CPT | Performed by: SURGERY

## 2020-06-08 PROCEDURE — 3017F COLORECTAL CA SCREEN DOC REV: CPT | Performed by: SURGERY

## 2020-06-08 PROCEDURE — 1036F TOBACCO NON-USER: CPT | Performed by: SURGERY

## 2020-06-08 PROCEDURE — G8419 CALC BMI OUT NRM PARAM NOF/U: HCPCS | Performed by: SURGERY

## 2020-06-15 ENCOUNTER — TELEPHONE (OUTPATIENT)
Dept: INTERNAL MEDICINE CLINIC | Age: 60
End: 2020-06-15

## 2020-06-15 RX ORDER — METFORMIN HYDROCHLORIDE 750 MG/1
TABLET, EXTENDED RELEASE ORAL
Qty: 60 TABLET | Refills: 2 | Status: SHIPPED
Start: 2020-06-15 | End: 2020-06-16 | Stop reason: RX

## 2020-06-29 ENCOUNTER — TELEPHONE (OUTPATIENT)
Dept: INTERNAL MEDICINE CLINIC | Age: 60
End: 2020-06-29

## 2020-07-20 ENCOUNTER — TELEPHONE (OUTPATIENT)
Dept: INTERNAL MEDICINE CLINIC | Age: 60
End: 2020-07-20

## 2020-07-20 RX ORDER — DULAGLUTIDE 1.5 MG/.5ML
1.5 INJECTION, SOLUTION SUBCUTANEOUS WEEKLY
Qty: 4 PEN | Refills: 5 | Status: SHIPPED | OUTPATIENT
Start: 2020-07-20 | End: 2020-12-14 | Stop reason: SDUPTHER

## 2020-07-20 NOTE — TELEPHONE ENCOUNTER
----- Message from Randy Brown sent at 7/20/2020 10:19 AM EDT -----  Subject: Refill Request    QUESTIONS  Name of Medication? Dulaglutide (TRULICITY) 1.5 AA/7.1EN SOPN  Patient-reported dosage and instructions? 1.5 mgs  How many days do you have left? 1  Preferred Pharmacy? [facility] [address] [phone]  Pharmacy phone number (if available)? [phone]  Additional Information for Provider? pt called [facility] pharmacy and they   stated they were waiting on the office staff to send over the script   ---------------------------------------------------------------------------  --------------  [address]  What is the best way for the office to contact you? OK to leave message on   voicemail  Preferred Call Back Phone Number?  [phone]

## 2020-10-15 ENCOUNTER — TELEPHONE (OUTPATIENT)
Dept: ADMINISTRATIVE | Age: 60
End: 2020-10-15

## 2020-10-15 NOTE — TELEPHONE ENCOUNTER
PA SUBMITTED VIA [facility] FOR Trulicity 0.4WK/4.8IM pen-injectors  Key: [id number]  STATUS: PENDING

## 2020-10-19 NOTE — TELEPHONE ENCOUNTER
Trulicity 4.3QB/1.8LU pen-injectors  Approved on October 15   CaseId:[id number];Status:Approved; Review Type:Qty;Coverage Start Date:10/15/2020; Coverage End Date:10/15/2021;

## 2020-11-09 ENCOUNTER — OFFICE VISIT (OUTPATIENT)
Dept: INTERNAL MEDICINE CLINIC | Age: 60
End: 2020-11-09
Payer: COMMERCIAL

## 2020-11-09 VITALS
OXYGEN SATURATION: 98 % | BODY MASS INDEX: 26 KG/M2 | HEART RATE: 92 BPM | WEIGHT: 146.8 LBS | SYSTOLIC BLOOD PRESSURE: 120 MMHG | DIASTOLIC BLOOD PRESSURE: 80 MMHG

## 2020-11-09 DIAGNOSIS — E11.9 TYPE 2 DIABETES MELLITUS WITHOUT COMPLICATION, WITHOUT LONG-TERM CURRENT USE OF INSULIN (HCC): ICD-10-CM

## 2020-11-09 DIAGNOSIS — E78.5 HYPERLIPIDEMIA LDL GOAL <70: ICD-10-CM

## 2020-11-09 DIAGNOSIS — I10 ESSENTIAL HYPERTENSION: ICD-10-CM

## 2020-11-09 LAB — HBA1C MFR BLD: 7.9 %

## 2020-11-09 PROCEDURE — G8427 DOCREV CUR MEDS BY ELIG CLIN: HCPCS | Performed by: INTERNAL MEDICINE

## 2020-11-09 PROCEDURE — 3017F COLORECTAL CA SCREEN DOC REV: CPT | Performed by: INTERNAL MEDICINE

## 2020-11-09 PROCEDURE — 83036 HEMOGLOBIN GLYCOSYLATED A1C: CPT | Performed by: INTERNAL MEDICINE

## 2020-11-09 PROCEDURE — G8419 CALC BMI OUT NRM PARAM NOF/U: HCPCS | Performed by: INTERNAL MEDICINE

## 2020-11-09 PROCEDURE — G8482 FLU IMMUNIZE ORDER/ADMIN: HCPCS | Performed by: INTERNAL MEDICINE

## 2020-11-09 PROCEDURE — 2022F DILAT RTA XM EVC RTNOPTHY: CPT | Performed by: INTERNAL MEDICINE

## 2020-11-09 PROCEDURE — 1036F TOBACCO NON-USER: CPT | Performed by: INTERNAL MEDICINE

## 2020-11-09 PROCEDURE — 3051F HG A1C>EQUAL 7.0%<8.0%: CPT | Performed by: INTERNAL MEDICINE

## 2020-11-09 PROCEDURE — 99214 OFFICE O/P EST MOD 30 MIN: CPT | Performed by: INTERNAL MEDICINE

## 2020-11-09 RX ORDER — GLIPIZIDE 5 MG/1
5 TABLET ORAL
Qty: 60 TABLET | Refills: 5 | Status: SHIPPED | OUTPATIENT
Start: 2020-11-09 | End: 2021-05-03

## 2020-11-09 ASSESSMENT — ENCOUNTER SYMPTOMS
DIARRHEA: 0
CHEST TIGHTNESS: 0
SHORTNESS OF BREATH: 0
ABDOMINAL PAIN: 0
CONSTIPATION: 0

## 2020-11-09 NOTE — PROGRESS NOTES
Patient: Lobo Sofia is a 60 y.o. female who presents today with the following Chief Complaint(s):  Chief Complaint   Patient presents with    Check-Up       HPI     Here today for follow up. Has not been seen since 1/2020- care has been delayed d/t COVID. Remains in remission from breast cancer and is hoping to go back to yearly mammograms    DM- has not been checking her sugars. Does not feel like they are high or low. Has not noticed any symptoms of hyper or hypoglycemia. Is wondering if Jardiance may be slowing her urinary stream. Does drink a lot of water. Remains on Trulicity with Lantus 60 units qhs. Metformin ER was changed to metformin d/t recall. No issues. HLD- due for lipid panel. No side effects with Crestor qhs. HTN- doing well on Diovan which she takes at night. Allergies   Allergen Reactions    Cefdinir Other (See Comments)     *Cephalosporins*: Pruritus.  Erythromycin Swelling     Throat swelling.  Ezetimibe-Simvastatin Other (See Comments)     Muscle cramps.  Lisinopril Other (See Comments)     Cough.  Niacin And Related Other (See Comments)     Niacin (antihyperlipidemic) *Antihyperlipidmics*: [facility] Other Other (See Comments)     darvocet    Pcn [Penicillins] Hives    Zithromax [Azithromycin] Swelling     Throat swelling.        Past Medical History:   Diagnosis Date    Allergic rhinitis, cause unspecified     Cancer ([facility] [address]     left breast    Generalized osteoarthrosis, involving multiple sites     Pure hypercholesterolemia     Pure hyperglyceridemia     Type II or unspecified type diabetes mellitus without mention of complication, uncontrolled     Unspecified essential hypertension       Past Surgical History:   Procedure Laterality Date    CHOLECYSTECTOMY, LAPAROSCOPIC N/A 12/19/2018    LAPAROSCOPIC CHOLECYSTECTOMY; CHOLANGIOGRAM performed by Duran Rodriguez MD at [address] [place] [address], [address]      colonoscopy ordered 6/2010; 5/2011; 11/2011, recommended again and refused at this time on 3/30/12 & 4/2012     COLONOSCOPY N/A 11/29/2018    COLONOSCOPY POLYPECTOMY SNARE/COLD BIOPSY performed by Stephy Albert MD at [address] Left 04/18/2019    Skin sparing mastectomy Sentinal Lymph node Bx Tissue expander    NASAL SEPTUM SURGERY      TONSILLECTOMY      WRIST SURGERY Right       Social History     Socioeconomic History    Marital status: Single     Spouse name: Not on file    Number of children: Not on file    Years of education: Not on file    Highest education level: Not on file   Occupational History    Not on file   Social Needs    Financial resource strain: Not hard at all    Food insecurity     Worry: Never true     Inability: Never true   [employer] needs     Medical: No     Non-medical: No   Tobacco Use    Smoking status: Never Smoker    Smokeless tobacco: Never Used   Substance and Sexual Activity    Alcohol use: No     Alcohol/week: 0.0 standard drinks    Drug use: No    Sexual activity: Not on file   Lifestyle    Physical activity     Days per week: Not on file     Minutes per session: Not on file    Stress: Not on file   Relationships    Social connections     Talks on phone: Not on file     Gets together: Not on file     Attends Mandaeism service: Not on file     Active member of club or organization: Not on file     Attends meetings of clubs or organizations: Not on file     Relationship status: Not on file    Intimate partner violence     Fear of current or ex partner: Not on file     Emotionally abused: Not on file     Physically abused: Not on file     Forced sexual activity: Not on file   Other Topics Concern    Not on file   Social History Narrative    Not on file     Family History   Problem Relation Age of Onset    Asthma Maternal Grandmother     Hypertension Maternal Grandmother     Breast Cancer Maternal Grandmother 76    Heart Disease Maternal Grandfather     Hypertension Paternal Grandmother     Heart Disease Paternal Grandfather     Hypertension Paternal Grandfather     COPD Father         Outpatient Medications Prior to Visit   Medication Sig Dispense Refill    valsartan (DIOVAN) 320 MG tablet TAKE ONE TABLET BY MOUTH ONCE NIGHTLY 30 tablet 2    metFORMIN (GLUCOPHAGE) 850 MG tablet TAKE ONE TABLET BY MOUTH TWICE A DAY WITH MEALS 60 tablet 4    Dulaglutide (TRULICITY) 1.5 SB/2.6TT SOPN Inject 1.5 mg into the skin once a week 4 pen 5    Insulin Pen Needle (B-D ULTRAFINE III SHORT PEN) 31G X 8 MM MISC USE ONCE DAILY 90 each 3    empagliflozin (JARDIANCE) 25 MG tablet Take 1 tablet by mouth daily 90 tablet 3    omeprazole (PRILOSEC) 40 MG delayed release capsule Take 1 capsule by mouth every morning (before breakfast) 90 capsule 3    rosuvastatin (CRESTOR) 10 MG tablet Take 1 tablet by mouth daily 90 tablet 3    insulin glargine (LANTUS SOLOSTAR) 100 UNIT/ML injection pen Inject 60 Units into the skin nightly 10 pen 5    blood glucose monitor kit and supplies Test BID times a day & as needed for symptoms of irregular blood glucose. 1 kit 0    blood glucose monitor strips Test BID  times a day & as needed for symptoms of irregular blood glucose. 100 strip 5    Lancets MISC 1 each by Does not apply route 2 times daily 100 each 5    diphenhydrAMINE (BENADRYL) 25 MG capsule Take 25 mg by mouth every 6 hours as needed for Itching      vitamin C (ASCORBIC ACID) 500 MG tablet Take 500 mg by mouth daily      CALCIUM PO Take by mouth 2 times daily      Denosumab (PROLIA SC) Inject into the skin every 6 months      anastrozole (ARIMIDEX) 1 MG tablet Take 1 mg by mouth nightly      Simethicone 180 MG CAPS Take by mouth 2 times daily as needed      Cholecalciferol (VITAMIN D3) 2000 units CAPS Take by mouth daily       Probiotic Product (PROBIOTIC DAILY PO) Take by mouth daily       Lancet Devices (SOFT TOUCH LANCET DEVICE) MISC by Does not apply route daily.        No facility-administered medications prior to visit. Patient'spast medical history, surgical history, family history, medications,  and allergies  were all reviewed and updated as appropriate today. Review of Systems   Constitutional: Negative for appetite change, fatigue, fever and unexpected weight change. Eyes: Negative for visual disturbance. Respiratory: Negative for chest tightness and shortness of breath. Cardiovascular: Negative for chest pain. Gastrointestinal: Negative for abdominal pain, constipation and diarrhea. Endocrine: Negative for cold intolerance, heat intolerance, polydipsia, polyphagia and polyuria. No low blood sugars   Skin: Negative for rash. /80   Pulse 92   Wt 146 lb 12.8 oz (66.6 kg)   SpO2 98%   BMI 26.00 kg/m²   Physical Exam  Vitals signs and nursing note reviewed. Constitutional:       Appearance: She is well-developed. She is not toxic-appearing. HENT:      Head: Normocephalic. Right Ear: Tympanic membrane, ear canal and external ear normal.      Left Ear: Tympanic membrane, ear canal and external ear normal.      Nose: Nose normal.      Mouth/Throat:      Mouth: Mucous membranes are moist.      Pharynx: No oropharyngeal exudate or posterior oropharyngeal erythema. Neck:      Thyroid: No thyroid mass or thyromegaly. Vascular: No carotid bruit. Cardiovascular:      Rate and Rhythm: Normal rate and regular rhythm. Pulses:           Dorsalis pedis pulses are 2+ on the right side and 2+ on the left side. Posterior tibial pulses are 2+ on the right side and 2+ on the left side. Heart sounds: Normal heart sounds. No murmur. Comments: No LE edema  Pulmonary:      Effort: Pulmonary effort is normal.      Breath sounds: Normal breath sounds. Musculoskeletal:      Right foot: Normal range of motion. No deformity, bunion, Charcot foot, foot drop or prominent metatarsal heads.       Left foot: Normal range of motion. No deformity, bunion, Charcot foot, foot drop or prominent metatarsal heads. Feet:      Right foot:      Protective Sensation: 10 sites tested. 10 sites sensed. Skin integrity: Skin integrity normal.      Toenail Condition: Right toenails are normal.      Left foot:      Protective Sensation: 10 sites tested. 10 sites sensed. Skin integrity: Skin integrity normal.      Toenail Condition: Left toenails are normal.   Lymphadenopathy:      Cervical: No cervical adenopathy. Neurological:      Mental Status: She is alert. Psychiatric:         Behavior: Behavior is cooperative. ASSESSMENT/PLAN:    Problem List Items Addressed This Visit     Ductal carcinoma in situ (DCIS) of left breast     Stable. Continues to follow with Dr. Kristen Simon and Dr. Hermilo Morillo. Is hopefully completing her last 6-month mammogram before changing to yearly. Remains on Arimidex. Essential hypertension - Primary     Well-controlled on valsartan 320 mg daily. Relevant Orders    COMPREHENSIVE METABOLIC PANEL    Hyperlipidemia LDL goal <70     Continue Crestor 10 mg daily. Check lipid panel and CMP. Relevant Orders    COMPREHENSIVE METABOLIC PANEL    Lipid Panel    Type 2 diabetes mellitus without complication, without long-term current use of insulin ([address]     Above goal with hemoglobin A1c of 7.9%. Add glipizide 5 mg twice daily. Continue metformin 850 mg twice daily, Jardiance 25 mg daily, Trulicity 1.5 mg weekly, and Lantus 60 units nightly. Discussed improving diet.          Relevant Medications    glipiZIDE (GLUCOTROL) 5 MG tablet    Other Relevant Orders    POCT glycosylated hemoglobin (Hb A1C)    Hemoglobin A1C    HM DIABETES FOOT EXAM (Completed)    Microalbumin / Creatinine Urine Ratio    Comprehensive Metabolic Panel    COMPREHENSIVE METABOLIC PANEL          Current Outpatient Medications   Medication Sig Dispense Refill    glipiZIDE (GLUCOTROL) 5 MG tablet Take 1 tablet by mouth 2 times daily (before meals) 60 tablet 5    valsartan (DIOVAN) 320 MG tablet TAKE ONE TABLET BY MOUTH ONCE NIGHTLY 30 tablet 2    metFORMIN (GLUCOPHAGE) 850 MG tablet TAKE ONE TABLET BY MOUTH TWICE A DAY WITH MEALS 60 tablet 4    Dulaglutide (TRULICITY) 1.5 GD/5.2TV SOPN Inject 1.5 mg into the skin once a week 4 pen 5    Insulin Pen Needle (B-D ULTRAFINE III SHORT PEN) 31G X 8 MM MISC USE ONCE DAILY 90 each 3    empagliflozin (JARDIANCE) 25 MG tablet Take 1 tablet by mouth daily 90 tablet 3    omeprazole (PRILOSEC) 40 MG delayed release capsule Take 1 capsule by mouth every morning (before breakfast) 90 capsule 3    rosuvastatin (CRESTOR) 10 MG tablet Take 1 tablet by mouth daily 90 tablet 3    insulin glargine (LANTUS SOLOSTAR) 100 UNIT/ML injection pen Inject 60 Units into the skin nightly 10 pen 5    blood glucose monitor kit and supplies Test BID times a day & as needed for symptoms of irregular blood glucose. 1 kit 0    blood glucose monitor strips Test BID  times a day & as needed for symptoms of irregular blood glucose. 100 strip 5    Lancets MISC 1 each by Does not apply route 2 times daily 100 each 5    diphenhydrAMINE (BENADRYL) 25 MG capsule Take 25 mg by mouth every 6 hours as needed for Itching      vitamin C (ASCORBIC ACID) 500 MG tablet Take 500 mg by mouth daily      CALCIUM PO Take by mouth 2 times daily      Denosumab (PROLIA SC) Inject into the skin every 6 months      anastrozole (ARIMIDEX) 1 MG tablet Take 1 mg by mouth nightly      Simethicone 180 MG CAPS Take by mouth 2 times daily as needed      Cholecalciferol (VITAMIN D3) 2000 units CAPS Take by mouth daily       Probiotic Product (PROBIOTIC DAILY PO) Take by mouth daily       Lancet Devices (SOFT TOUCH LANCET DEVICE) MISC by Does not apply route daily. No current facility-administered medications for this visit. Return in about 3 months (around 2/9/2021) for labs prior.

## 2020-11-09 NOTE — ASSESSMENT & PLAN NOTE
Stable. Continues to follow with Dr. Dipak Rees and Dr. Melinda Rodriguez. Is hopefully completing her last 6-month mammogram before changing to yearly. Remains on Arimidex.

## 2020-11-09 NOTE — ASSESSMENT & PLAN NOTE
Above goal with hemoglobin A1c of 7.9%. Add glipizide 5 mg twice daily. Continue metformin 850 mg twice daily, Jardiance 25 mg daily, Trulicity 1.5 mg weekly, and Lantus 60 units nightly. Discussed improving diet.

## 2020-11-10 LAB
A/G RATIO: 2 (ref 1.1–2.2)
ALBUMIN SERPL-MCNC: 5.1 G/DL (ref 3.4–5)
ALP BLD-CCNC: 90 U/L (ref 40–129)
ALT SERPL-CCNC: 15 U/L (ref 10–40)
ANION GAP SERPL CALCULATED.3IONS-SCNC: 14 MMOL/L (ref 3–16)
AST SERPL-CCNC: 22 U/L (ref 15–37)
BILIRUB SERPL-MCNC: 1 MG/DL (ref 0–1)
BUN BLDV-MCNC: 10 MG/DL (ref 7–20)
CALCIUM SERPL-MCNC: 9.9 MG/DL (ref 8.3–10.6)
CHLORIDE BLD-SCNC: 106 MMOL/L (ref 99–110)
CHOLESTEROL, TOTAL: 136 MG/DL (ref 0–199)
CO2: 22 MMOL/L (ref 21–32)
CREAT SERPL-MCNC: 0.7 MG/DL (ref 0.6–1.2)
GFR AFRICAN AMERICAN: >60
GFR NON-AFRICAN AMERICAN: >60
GLOBULIN: 2.6 G/DL
GLUCOSE BLD-MCNC: 147 MG/DL (ref 70–99)
HDLC SERPL-MCNC: 43 MG/DL (ref 40–60)
LDL CHOLESTEROL CALCULATED: 55 MG/DL
POTASSIUM SERPL-SCNC: 4.3 MMOL/L (ref 3.5–5.1)
SODIUM BLD-SCNC: 142 MMOL/L (ref 136–145)
TOTAL PROTEIN: 7.7 G/DL (ref 6.4–8.2)
TRIGL SERPL-MCNC: 191 MG/DL (ref 0–150)
VLDLC SERPL CALC-MCNC: 38 MG/DL

## 2020-12-14 ENCOUNTER — TELEPHONE (OUTPATIENT)
Dept: INTERNAL MEDICINE CLINIC | Age: 60
End: 2020-12-14

## 2020-12-14 RX ORDER — DULAGLUTIDE 1.5 MG/.5ML
1.5 INJECTION, SOLUTION SUBCUTANEOUS WEEKLY
Qty: 4 PEN | Refills: 5 | Status: SHIPPED | OUTPATIENT
Start: 2020-12-14 | End: 2021-06-28

## 2020-12-14 RX ORDER — OMEPRAZOLE 40 MG/1
40 CAPSULE, DELAYED RELEASE ORAL
Qty: 90 CAPSULE | Refills: 1 | Status: SHIPPED | OUTPATIENT
Start: 2020-12-14 | End: 2021-06-28

## 2020-12-14 RX ORDER — INSULIN GLARGINE 100 [IU]/ML
60 INJECTION, SOLUTION SUBCUTANEOUS NIGHTLY
Qty: 10 PEN | Refills: 5 | Status: SHIPPED | OUTPATIENT
Start: 2020-12-14 | End: 2021-07-12 | Stop reason: SDUPTHER

## 2020-12-14 RX ORDER — VALSARTAN 320 MG/1
320 TABLET ORAL DAILY
Qty: 90 TABLET | Refills: 1 | Status: SHIPPED | OUTPATIENT
Start: 2020-12-14 | End: 2021-06-28

## 2020-12-14 RX ORDER — ROSUVASTATIN CALCIUM 10 MG/1
10 TABLET, COATED ORAL DAILY
Qty: 90 TABLET | Refills: 1 | Status: SHIPPED | OUTPATIENT
Start: 2020-12-14 | End: 2021-06-28

## 2020-12-14 NOTE — TELEPHONE ENCOUNTER
Patient states she needs refill of lantus, trulicity, metformin, jardiance, rosuvastatin, valsartan & omeprozole sent to [facility] on [address] in [place]. She states she is down to last pen of lantus.

## 2020-12-21 ENCOUNTER — HOSPITAL ENCOUNTER (OUTPATIENT)
Dept: GENERAL RADIOLOGY | Age: 60
Discharge: HOME | End: 2020-12-21
Payer: COMMERCIAL

## 2020-12-21 PROCEDURE — 77080 DXA BONE DENSITY AXIAL: CPT

## 2021-04-06 RX ORDER — PEN NEEDLE, DIABETIC 31 GX5/16"
NEEDLE, DISPOSABLE MISCELLANEOUS
Qty: 100 EACH | Refills: 0 | Status: SHIPPED | OUTPATIENT
Start: 2021-04-06 | End: 2021-07-01

## 2021-05-03 RX ORDER — GLIPIZIDE 5 MG/1
TABLET ORAL
Qty: 60 TABLET | Refills: 4 | Status: SHIPPED | OUTPATIENT
Start: 2021-05-03 | End: 2021-07-12 | Stop reason: SDUPTHER

## 2021-06-08 ENCOUNTER — TELEPHONE (OUTPATIENT)
Dept: INTERNAL MEDICINE CLINIC | Age: 61
End: 2021-06-08

## 2021-06-08 NOTE — TELEPHONE ENCOUNTER
Can you please call her and get more details? Can add at end of day tomorrow as either VV or in person if warranted.  saw

## 2021-06-08 NOTE — TELEPHONE ENCOUNTER
----- Message from Michaela Bryant sent at 6/8/2021 12:11 PM EDT -----  Subject: Message to Provider    QUESTIONS  Information for Provider? Pt states she has a cough since Sunday,   veronica wouldn't allow an appt to be scheduled since was symptoms hasn't   been more than 5 days  ---------------------------------------------------------------------------  --------------  CALL BACK INFO  What is the best way for the office to contact you? OK to leave message on   voicemail  Preferred Call Back Phone Number? [phone]  ---------------------------------------------------------------------------  --------------  SCRIPT ANSWERS  Relationship to Patient? Self  Appointment reason? Symptomatic  Select script based on patient symptoms? Adult Cough/Cold Symptoms [Runny   Nose, Sore Throat, Flu, Sinus, Sinus Infection, Upper Respiratory   Infection [URI], Congestion]   Are you currently unable to finish sentences due to any difficulty   breathing? No  Are you unable to swallow liquids? No  Are you having fevers (100.4 or greater), chills, or sweats? No  Do you have COPD, asthma or a chronic lung condition? No  Have your symptoms been present for more than 5 days?  No

## 2021-06-09 ENCOUNTER — OFFICE VISIT (OUTPATIENT)
Dept: INTERNAL MEDICINE CLINIC | Age: 61
End: 2021-06-09
Payer: COMMERCIAL

## 2021-06-09 ENCOUNTER — NURSE TRIAGE (OUTPATIENT)
Dept: OTHER | Facility: CLINIC | Age: 61
End: 2021-06-09

## 2021-06-09 ENCOUNTER — TELEPHONE (OUTPATIENT)
Dept: INTERNAL MEDICINE CLINIC | Age: 61
End: 2021-06-09

## 2021-06-09 VITALS
SYSTOLIC BLOOD PRESSURE: 138 MMHG | HEIGHT: 62 IN | HEART RATE: 95 BPM | DIASTOLIC BLOOD PRESSURE: 80 MMHG | BODY MASS INDEX: 26.39 KG/M2 | OXYGEN SATURATION: 97 % | TEMPERATURE: 97.4 F | WEIGHT: 143.4 LBS

## 2021-06-09 DIAGNOSIS — R09.81 NASAL CONGESTION: ICD-10-CM

## 2021-06-09 DIAGNOSIS — J30.89 SEASONAL ALLERGIC RHINITIS DUE TO OTHER ALLERGIC TRIGGER: ICD-10-CM

## 2021-06-09 DIAGNOSIS — R05.9 COUGH: Primary | ICD-10-CM

## 2021-06-09 DIAGNOSIS — R06.7 SNEEZING: ICD-10-CM

## 2021-06-09 PROCEDURE — 3017F COLORECTAL CA SCREEN DOC REV: CPT | Performed by: NURSE PRACTITIONER

## 2021-06-09 PROCEDURE — G8419 CALC BMI OUT NRM PARAM NOF/U: HCPCS | Performed by: NURSE PRACTITIONER

## 2021-06-09 PROCEDURE — G8427 DOCREV CUR MEDS BY ELIG CLIN: HCPCS | Performed by: NURSE PRACTITIONER

## 2021-06-09 PROCEDURE — 99213 OFFICE O/P EST LOW 20 MIN: CPT | Performed by: NURSE PRACTITIONER

## 2021-06-09 PROCEDURE — 1036F TOBACCO NON-USER: CPT | Performed by: NURSE PRACTITIONER

## 2021-06-09 RX ORDER — LORATADINE 10 MG/1
10 TABLET ORAL DAILY
Qty: 90 TABLET | Refills: 0 | Status: SHIPPED | OUTPATIENT
Start: 2021-06-09 | End: 2021-09-07 | Stop reason: SDUPTHER

## 2021-06-09 RX ORDER — FLUTICASONE PROPIONATE 50 MCG
1 SPRAY, SUSPENSION (ML) NASAL DAILY
Qty: 1 BOTTLE | Refills: 0 | Status: SHIPPED | OUTPATIENT
Start: 2021-06-09 | End: 2021-07-06

## 2021-06-09 ASSESSMENT — ENCOUNTER SYMPTOMS
ABDOMINAL PAIN: 0
VOMITING: 0
CONSTIPATION: 0
EYE PAIN: 0
SORE THROAT: 0
COUGH: 1
WHEEZING: 0
NAUSEA: 0
SHORTNESS OF BREATH: 0
DIARRHEA: 0
SINUS PRESSURE: 1
RHINORRHEA: 1
TROUBLE SWALLOWING: 0

## 2021-06-09 ASSESSMENT — PATIENT HEALTH QUESTIONNAIRE - PHQ9
1. LITTLE INTEREST OR PLEASURE IN DOING THINGS: 0
SUM OF ALL RESPONSES TO PHQ QUESTIONS 1-9: 0
SUM OF ALL RESPONSES TO PHQ QUESTIONS 1-9: 0
SUM OF ALL RESPONSES TO PHQ9 QUESTIONS 1 & 2: 0
SUM OF ALL RESPONSES TO PHQ QUESTIONS 1-9: 0
2. FEELING DOWN, DEPRESSED OR HOPELESS: 0

## 2021-06-09 NOTE — TELEPHONE ENCOUNTER
Reason for Disposition   Chest pain present when not coughing    Answer Assessment - Initial Assessment Questions  1. ONSET: \"When did the cough begin? \"       Started on Sunday after mowing her grass    2. SEVERITY: \"How bad is the cough today? \"   Cough is a constant cough and will have a hard time catching her breath. Unable to cough any phlegm up. 3. RESPIRATORY DISTRESS: \"Describe your breathing. \"       Chest tightness will stay and sob when coughing-off and on    4. FEVER: \"Do you have a fever? \" If so, ask: \"What is your temperature, how was it measured, and when did it start? \"  No fever    5. HEMOPTYSIS: \"Are you coughing up any blood? \" If so ask: \"How much? \" (flecks, streaks, tablespoons, etc.)      6. TREATMENT: \"What have you done so far to treat the cough? \" (e.g., meds, fluids, humidifier)  Has taken allergy medication-Benadryl    7. CARDIAC HISTORY: \"Do you have any history of heart disease? \" (e.g., heart attack, congestive heart failure)   No    8. LUNG HISTORY: \"Do you have any history of lung disease? \"  (e.g., pulmonary embolus, asthma, emphysema)  No    9. PE RISK FACTORS: \"Do you have a history of blood clots? \" (or: recent major surgery, recent prolonged travel, bedridden)    No    10. OTHER SYMPTOMS: \"Do you have any other symptoms? (e.g., runny nose, wheezing, chest pain)       Has congestion,   No wheezing    11. PREGNANCY: \"Is there any chance you are pregnant? \" \"When was your last menstrual period? \"     12. TRAVEL: \"Have you traveled out of the country in the last month? \" (e.g., travel history, exposures)    Protocols used: COUGH-ADULT-[place]    [facility] from [address] [facility] [place] with Red Flag Complaint. Brief description of triage: Pt with cough since Sunday. Cough is constant. Is also having chest tightness that is all the time. Will have sob off and on. No fever. Some congestion. Triage indicates for patient to go to ED.  Pt refusing ED, wants to see PCP.    Care advice provided, patient verbalizes understanding; denies any other questions or concerns; instructed to call back for any new or worsening symptoms. Writer provided warm transfer to Aj Singleton at PCP office. Attention Provider: Thank you for allowing me to participate in the care of your patient. The patient was connected to triage in response to information provided to the ECC. Please do not respond through this encounter as the response is not directed to a shared pool.

## 2021-06-09 NOTE — PROGRESS NOTES
Acute Office Visit  6/9/2021    SUBJECTIVE:    Patient ID: Altaf Lake is a 63 y.o. female. Chief Complaint   Patient presents with    URI     cough; used to get URI all the times, mowing grass over the weekend, seen an allergist for this in the past     HPI: The patient presents to the office for an acute visit. Pt reports that she has a history of URI. She states that she used to get allergy shots but she stopped. She was mowing the grass 3 days ago and states that she had chest tightness and a dry cough. Sneezing present intermittent. Nasal congestion present. Worse in the morning. Denies sore throat. Denies fevers or chills. Denies N/V, diarrhea or abdominal pain. Denies CP, SOB or wheezing. Fully vaccinated for COVID-19 per pt. Treatment tried and response - benadryl as needed. Used to take Flonase but stopped years ago  Recent ill contacts? Mother  No recent travel  Tobacco use or second hand smoke exposure - No  Condition better, worsening, or stable -  better    Allergies   Allergen Reactions    Cefdinir Other (See Comments)     *Cephalosporins*: Pruritus.  Erythromycin Swelling     Throat swelling.  Ezetimibe-Simvastatin Other (See Comments)     Muscle cramps.  Lisinopril Other (See Comments)     Cough.  Niacin And Related Other (See Comments)     Niacin (antihyperlipidemic) *Antihyperlipidmics*: S.E.   McDonald Osmar Other Other (See Comments)     darvocet    Pcn [Penicillins] Hives    Zithromax [Azithromycin] Swelling     Throat swelling.       Current Outpatient Medications   Medication Sig Dispense Refill    loratadine (CLARITIN) 10 MG tablet Take 1 tablet by mouth daily 90 tablet 0    fluticasone (FLONASE) 50 MCG/ACT nasal spray 1 spray by Each Nostril route daily 1 Bottle 0    glipiZIDE (GLUCOTROL) 5 MG tablet TAKE ONE TABLET BY MOUTH TWICE A DAY BEFORE A MEAL 60 tablet 4    Insulin Pen Needle ([employer] PEN NEEDLES 31G) 31G X 8 MM MISC USE ONCE DAILY 100 each 0    LANTUS SOLOSTAR 100 UNIT/ML injection pen INJECT 60 UNITS UNDER THE SKIN NIGHTLY 5 pen 5    insulin glargine (LANTUS SOLOSTAR) 100 UNIT/ML injection pen Inject 60 Units into the skin nightly 10 pen 5    Dulaglutide (TRULICITY) 1.5 [place]/1.3RP SOPN Inject 1.5 mg into the skin once a week 4 pen 5    metFORMIN (GLUCOPHAGE) 850 MG tablet Take 1 tablet by mouth 2 times daily (with meals) 180 tablet 1    empagliflozin (JARDIANCE) 25 MG tablet Take 1 tablet by mouth daily 90 tablet 1    valsartan (DIOVAN) 320 MG tablet Take 1 tablet by mouth daily 90 tablet 1    omeprazole (PRILOSEC) 40 MG delayed release capsule Take 1 capsule by mouth every morning (before breakfast) 90 capsule 1    rosuvastatin (CRESTOR) 10 MG tablet Take 1 tablet by mouth daily 90 tablet 1    blood glucose monitor kit and supplies Test BID times a day & as needed for symptoms of irregular blood glucose. 1 kit 0    blood glucose monitor strips Test BID  times a day & as needed for symptoms of irregular blood glucose. 100 strip 5    Lancets MISC 1 each by Does not apply route 2 times daily 100 each 5    vitamin C (ASCORBIC ACID) 500 MG tablet Take 500 mg by mouth daily      CALCIUM PO Take by mouth 2 times daily      Denosumab (PROLIA SC) Inject into the skin every 6 months      anastrozole (ARIMIDEX) 1 MG tablet Take 1 mg by mouth nightly      Simethicone 180 MG CAPS Take by mouth 2 times daily as needed      Cholecalciferol (VITAMIN D3) 2000 units CAPS Take by mouth daily       Probiotic Product (PROBIOTIC DAILY PO) Take by mouth daily       Lancet Devices (SOFT TOUCH LANCET DEVICE) MISC by Does not apply route daily. No current facility-administered medications for this visit. Review of Systems   Constitutional: Negative for appetite change, chills, diaphoresis, fatigue and fever. HENT: Positive for congestion, rhinorrhea, sinus pressure and sneezing.  Negative for drooling, ear discharge, ear pain, hearing loss, postnasal drip, sore throat and trouble swallowing. Eyes: Negative for pain and visual disturbance. Respiratory: Positive for cough. Negative for shortness of breath and wheezing. Cardiovascular: Negative for chest pain and palpitations. Gastrointestinal: Negative for abdominal pain, constipation, diarrhea, nausea and vomiting. Skin: Negative for pallor. Neurological: Negative for dizziness, light-headedness and headaches. OBJECTIVE:  BP (!) 148/80   Pulse 95   Temp 97.4 °F (36.3 °C)   Ht 5' 2\" (1.575 m)   Wt 143 lb 6.4 oz (65 kg)   SpO2 97%   BMI 26.23 kg/m²    Physical Exam  Vitals reviewed. Constitutional:       General: She is not in acute distress. Appearance: She is well-developed. She is not diaphoretic. HENT:      Head: Normocephalic and atraumatic. Right Ear: Hearing, tympanic membrane and external ear normal.      Left Ear: Hearing, tympanic membrane and external ear normal.   Eyes:      Conjunctiva/sclera: Conjunctivae normal.      Pupils: Pupils are equal, round, and reactive to light. Cardiovascular:      Rate and Rhythm: Normal rate and regular rhythm. Pulmonary:      Effort: Pulmonary effort is normal. No respiratory distress. Breath sounds: Normal breath sounds. No stridor. No wheezing. Abdominal:      General: Bowel sounds are normal.      Palpations: Abdomen is soft. Tenderness: There is no abdominal tenderness. Skin:     General: Skin is warm and dry. Neurological:      Mental Status: She is alert and oriented to person, place, and time. ASSESSMENT/PLAN:  Avanell Cranker was seen today for uri. Diagnoses and all orders for this visit:    Cough/Nasal congestion/Sneezing/Seasonal allergic rhinitis due to other allergic trigger  -    Afebrile. Lungs clear to auscultation. Oxygen saturation 97% on room air.  - Symptoms for 3 days. - Chest tightness when coughing. Recommended EKG- pt defers. Pt states chest tightness is from coughing too much.  Risks vs benefits reviewed. - Started when she was outside. Worse in the AM. Likely allergic rhinitis. Recommend patient restart Flonase and take Claritin daily. Reviewed risks versus benefits of Claritin versus Benadryl. She is agreeable to Claritin daily. - loratadine (CLARITIN) 10 MG tablet; Take 1 tablet by mouth daily - patient education handout provided and reviewed with the pt. -     fluticasone (FLONASE) 50 MCG/ACT nasal spray; 1 spray by Each Nostril route daily - patient education handout provided and reviewed with the pt. - Pt will call if symptoms worsen or fail to improve  - Red flag warning signs reviewed with the pt and she will go to the ER if these occur. Return for as previously scheduled or sooner if needed. Pt informed to call if symptoms worsen or fail to improve. All questions answered. Patient states no further questions or concerns at this time.     Electronically signed by MAKENZIE Pederson CNP 06/09/21

## 2021-06-09 NOTE — TELEPHONE ENCOUNTER
Spoke with pt and she was fine with seeing a NP. Pt is scheduled today at 9:20 to see [address] [place] [address].

## 2021-06-09 NOTE — PATIENT INSTRUCTIONS
Patient Education        loratadine  Pronunciation:  praveen AT a demarcus  Brand:  Alavert, Claritin, Claritin Reditab, Clear-Atadine, Dimetapp ND, ohm Allergy Relief, QlearQuil All Day & Night, Tavist ND, Maryjaneitin  What is the most important information I should know about loratadine? Follow all directions on your medicine label and package. Tell each of your healthcare providers about all your medical conditions, allergies, and all medicines you use. What is loratadine? Loratadine is an antihistamine that reduces the effects of natural chemical histamine in the body. Histamine can produce symptoms of sneezing, itching, watery eyes, and runny nose. Loratadine is used to treat sneezing, runny nose, watery eyes, hives, skin rash, itching, and other cold or allergy symptoms. Loratadine is also used to treat skin hives and itching in people with chronic skin reactions. Loratadine may also be used for purposes not listed in this medication guide. What should I discuss with my healthcare provider before taking loratadine? You should not take this medicine if you are allergic to loratadine or to desloratadine (Clarinex). Ask a doctor or pharmacist if it is safe for you to use this medicine if you have other medical conditions, especially:  · asthma;  · kidney disease; or  · liver disease. This medicine is not expected to harm an unborn baby. Tell your doctor if you are pregnant or plan to become pregnant. Loratadine can pass into breast milk and may harm a nursing baby. Tell your doctor if you are breast-feeding a baby. Some forms of loratadine may contain phenylalanine. Talk to your doctor before taking loratadine if you have phenylketonuria (PKU). Do not give this medicine to a child younger than 10years old without the advice of a doctor. How should I take loratadine? Use exactly as directed on the label, or as prescribed by your doctor. Do not use in larger or smaller amounts or for longer than recommended. Cold or allergy medicine is usually taken only for a short time until your symptoms clear up. Do not give this medicine to a child younger than 3years old. Always ask a doctor before giving a cough or cold medicine to a child. Death can occur from the misuse of cough and cold medicines in very young children. Loratadine is usually taken once per day. Follow your doctor's instructions. Do not crush, chew, or break the regular tablet. Swallow the pill whole. Measure liquid medicine with the dosing syringe provided, or with a special dose-measuring spoon or medicine cup. If you do not have a dose-measuring device, ask your pharmacist for one. The chewable tablet must be chewed before you swallow it. To take the orally disintegrating tablet (Claritin RediTab, Alavert):  · Keep the tablet in its blister pack until you are ready to take it. Open the package and peel back the foil. Do not push a tablet through the foil or you may damage the tablet. · Use dry hands to remove the tablet and place it in your mouth. · Do not swallow the tablet whole. Allow it to dissolve in your mouth without chewing. If desired, you may drink liquid to help swallow the dissolved tablet. Call your doctor if your symptoms do not improve, or if they get worse. Store at room temperature away from moisture and heat. What happens if I miss a dose? Take the missed dose as soon as you remember. Skip the missed dose if it is almost time for your next scheduled dose. Do not  take extra medicine to make up the missed dose. What happens if I overdose? Seek emergency medical attention or call the Poison Help line at 1-792.956.8498. Overdose symptoms may include headache, drowsiness, and fast or pounding heartbeat. What should I avoid while taking loratadine? Follow your doctor's instructions about any restrictions on food, beverages, or activity. What are the possible side effects of loratadine?   Get emergency medical help if you have signs of an allergic reaction:  hives; difficult breathing; swelling of your face, lips, tongue, or throat. Stop using loratadine and call your doctor at once if you have:  · fast or uneven heart rate;  · severe headache; or  · a light-headed feeling, like you might pass out;  Common side effects may include:  · headache;  · feeling tired or drowsy;  · stomach pain, vomiting;  · dry mouth; or  · feeling nervous or hyperactive. This is not a complete list of side effects and others may occur. Call your doctor for medical advice about side effects. You may report side effects to FDA at 7-667-FDA-5171. What other drugs will affect loratadine? Other drugs may interact with loratadine, including prescription and over-the-counter medicines, vitamins, and herbal products. Tell each of your health care providers about all medicines you use now and any medicine you start or stop using. Where can I get more information? Your pharmacist can provide more information about loratadine. Remember, keep this and all other medicines out of the reach of children, never share your medicines with others, and use this medication only for the indication prescribed. Every effort has been made to ensure that the information provided by [address] is accurate, up-to-date, and complete, but no guarantee is made to that effect. Drug information contained herein may be time sensitive. [employer] information has been compiled for use by healthcare practitioners and consumers in the [facility] and therefore [employer] does not warrant that uses outside of the [facility] are appropriate, unless specifically indicated otherwise. [employer] drug information does not endorse drugs, diagnose patients or recommend therapy.  [employer] drug information is an informational resource designed to assist licensed healthcare practitioners in caring for their patients and/or to serve consumers viewing this service as a supplement to, and not a substitute for, the expertise, skill, knowledge and judgment of healthcare practitioners. The absence of a warning for a given drug or drug combination in no way should be construed to indicate that the drug or drug combination is safe, effective or appropriate for any given patient. [facility] does not assume any responsibility for any aspect of healthcare administered with the aid of information [facility] provides. The information contained herein is not intended to cover all possible uses, directions, precautions, warnings, drug interactions, allergic reactions, or adverse effects. If you have questions about the drugs you are taking, check with your doctor, nurse or pharmacist.  Copyright 9596-4711 [address]: 9.02. Revision date: 2/20/2015. Care instructions adapted under license by [facility] ([facility]). If you have questions about a medical condition or this instruction, always ask your healthcare professional. [place] [address] any warranty or liability for your use of this information. Patient Education        fluticasone nasal  Pronunciation:  floo TIK a svetlanae  Brand:  Flonase, Veramyst, Prieto Gerhard  What is the most important information I should know about fluticasone nasal?  Follow all directions on your medicine label and package. Tell each of your healthcare providers about all your medical conditions, allergies, and all medicines you use. What is fluticasone nasal?  Fluticasone nasal (for the nose) is a steroid medicine that is used to treat nasal congestion, sneezing, runny nose, and itchy or watery eyes caused by seasonal or year-round allergies. The Prieto Gerhard brand of this medicine is for use only in adults. Veramyst may be used in children as young as 3years old. Flonase is for use in adults and children who are at least 3years old. Fluticasone nasal may also be used for purposes not listed in this medication guide.   What should I discuss with my healthcare provider and heat. Throw the spray bottle away after you have used 120 sprays, even if there is still medicine left in the bottle. What happens if I miss a dose? Use the medicine as soon as you can, but skip the missed dose if it is almost time for your next dose. Do not use two doses at one time. What happens if I overdose? Seek emergency medical attention or call the Poison Help line at 1-389.595.5732. An overdose of fluticasone nasal is not expected to produce life threatening symptoms. Long term use of steroid medicine can lead to glaucoma, cataracts, thinning skin, easy bruising, changes in body fat (especially in your face, neck, back, and waist), increased acne or facial hair, menstrual problems, impotence, or loss of interest in sex. What should I avoid while using fluticasone nasal?  Avoid getting the spray in your eyes or mouth. If this does happen, rinse with water. Avoid being near people who are sick or have infections. Call your doctor for preventive treatment if you are exposed to chickenpox or measles. These conditions can be serious or even fatal in people who are using fluticasone nasal.  What are the possible side effects of fluticasone nasal?  Get emergency medical help if you have signs of an allergic reaction: hives, rash; feeling light-headed; difficult breathing; swelling of your face, lips, tongue, or throat. Call your doctor at once if you have:  · severe or ongoing nosebleeds;  · noisy breathing, runny nose, or crusting around your nostrils;  · redness, sores, or white patches in your mouth or throat;  · fever, chills, body aches;  · blurred vision, eye pain, or seeing halos around lights;  · any wound that will not heal; or  · signs of a hormonal disorder --worsening tiredness or muscle weakness, feeling light-headed, nausea, vomiting. Steroid medicine can affect growth in children. Tell your doctor if your child is not growing at a normal rate while using this medicine.   Common side effects may include:  · minor nosebleed, burning or itching in your nose;  · sores or white patches inside or around your nose;  · cough, trouble breathing;  · headache, back pain;  · sinus pain, sore throat, fever; or  · nausea, vomiting. This is not a complete list of side effects and others may occur. Call your doctor for medical advice about side effects. You may report side effects to FDA at 8-459-SZA-4264. What other drugs will affect fluticasone nasal?  Tell your doctor about all your other medicines, especially:  · antifungal medicine; or  · antiviral medicine to treat hepatis C or HIV/AIDS. This list is not complete. Other drugs may affect fluticasone nasal, including prescription and over-the-counter medicines, vitamins, and herbal products. Not all possible drug interactions are listed here. Where can I get more information? Your pharmacist can provide more information about fluticasone nasal.  Remember, keep this and all other medicines out of the reach of children, never share your medicines with others, and use this medication only for the indication prescribed. Every effort has been made to ensure that the information provided by [address] is accurate, up-to-date, and complete, but no guarantee is made to that effect. Drug information contained herein may be time sensitive. [employer] information has been compiled for use by healthcare practitioners and consumers in the [place] and therefore [employer] does not warrant that uses outside of the [place] are appropriate, unless specifically indicated otherwise. [employer] drug information does not endorse drugs, diagnose patients or recommend therapy.  [employer] drug information is an informational resource designed to assist licensed healthcare practitioners in caring for their patients and/or to serve consumers viewing this service as a supplement to, and not a substitute for, the expertise, skill, knowledge and judgment of healthcare practitioners. The absence of a warning for a given drug or drug combination in no way should be construed to indicate that the drug or drug combination is safe, effective or appropriate for any given patient. [facility] does not assume any responsibility for any aspect of healthcare administered with the aid of information [facility] provides. The information contained herein is not intended to cover all possible uses, directions, precautions, warnings, drug interactions, allergic reactions, or adverse effects. If you have questions about the drugs you are taking, check with your doctor, nurse or pharmacist.  Copyright 6246-8494 [address]: 10.02. Revision date: 12/30/2019. Care instructions adapted under license by [facility] ([facility]). If you have questions about a medical condition or this instruction, always ask your healthcare professional. [place] [address] any warranty or liability for your use of this information.

## 2021-06-09 NOTE — TELEPHONE ENCOUNTER
Patient called to report a cough with chest tightness. Patient states she didn't want to go to the ER and wanted to get in to see her Provider. I dont see any current openings. Please advise if she can be seen today by Dr. Adrian Salazar.

## 2021-06-10 NOTE — PROGRESS NOTES
PCP:  [facility]  Radiation:  Other: Pranay Abdalla        pT1cN0  STAGE:  IA left breast cancer      Ms. Rohit Mcmillan is a 63y.o.-year-old woman who initially presented to me with  left breast cancer. Since her last encounter Ms. Chopra has been doing quite well. She did unfortunately have to have her expander removed due to an infection. She has been interested in other reconstructive options in the past but has not decided to proceed. She has had a benign evaluation of a palpable concern in the past.  There are no new breast related concerns today. INTERVAL HISTORY:    On 4/18/2018 she underwent a left mastectomy with sentinel lymph node biopsy. Pathology identified 1.3 cm of grade 2 invasive ductal carcinoma with grade 3 DCIS. ER positive HI negative HER-2 negative (repeat testing). Margins were negative. There were 0/4 lymph nodes involved carcinoma. On 3/29/2019 she underwent screening right breast mammogram. There are no concerning findings suggestive of malignancy. BI-RADS 1. On 12/26/2019 she underwent ultrasound of the left breast wall for a palpable concern. There is a benign-appearing cystic lesion noted in her area of palpable concern. BI-RADS 2. On 6/1/2020 she underwent unilateral right screening mammography. The parenchymal pattern is stable. There are no new concerning findings suggestive of malignancy. BI-RADS 2. On 6/14/2021 she underwent unilateral right screening mammography. There were no concerning findings suggestive of malignancy. BI-RADS 1. Exam:  The physical exam has been reviewed and updated where needed. General: no acute distress  Breast:  The patient was examined in the upright and supine position. There is a well healed scar on the  left breast. There is a small amount of medial redundant tissue. In the upper outer quadrant of the chest wall approaching the axilla there is a small subcentimeter firm palpable nodule.   This has been reviewed radiologically and characterized with benign features. The exam features are unchanged. There are additional expected  post surgical changes. She has good range of motion with her arm. Her contralateral breast shows no new masses or changes in breast contour. There were no skin changes of the breast or nipple areolar complex. There was no nipple inversion or discharge. Respiratory: respirations are non-labored and there is no audible distress  Cardiovascular: regular rate, extremities appear well perfused  Neurologic: alert, oriented      Assessment/Plan:  pT1cN0  STAGE:  IA left breast cancer  ER positive NE negative HER 2 negative (repeat testing)  S/p left SS mastectomy with SLNB and TE recon  S/p left breast infection and removal of expander    Taking Arimidex    We reviewed her physical exam findings and most recent breast imaging. There are no signs of recurrence. Her physical exam is stable. Signs/symptoms of recurrence were reviewed. She verbalizes understanding that she should notify our office if she identifies any abnormalities on self evaluation as it may require further workup. I encouraged her to continue self breast evaluation. She currently uses a prosthesis. Follow up surveillance was discussed. At this point we will plan to have her follow-up in 1 year with clinical exam and unilateral right screening mammography. All of the patient's questions were answered at this time however, she was encouraged to call the office with any further inquiries. Approximately 20 minutes of time were spent in preparation, direct patient contact, care coordination, documentation and activities otherwise related to this encounter.

## 2021-06-14 ENCOUNTER — TELEPHONE (OUTPATIENT)
Dept: INTERNAL MEDICINE CLINIC | Age: 61
End: 2021-06-14

## 2021-06-14 ENCOUNTER — HOSPITAL ENCOUNTER (OUTPATIENT)
Dept: WOMENS IMAGING | Age: 61
Discharge: HOME | End: 2021-06-14
Payer: COMMERCIAL

## 2021-06-14 ENCOUNTER — OFFICE VISIT (OUTPATIENT)
Dept: SURGERY | Age: 61
End: 2021-06-14
Payer: COMMERCIAL

## 2021-06-14 VITALS
BODY MASS INDEX: 25.95 KG/M2 | TEMPERATURE: 97.9 F | OXYGEN SATURATION: 95 % | HEART RATE: 100 BPM | WEIGHT: 141 LBS | SYSTOLIC BLOOD PRESSURE: 139 MMHG | DIASTOLIC BLOOD PRESSURE: 78 MMHG | RESPIRATION RATE: 18 BRPM | HEIGHT: 62 IN

## 2021-06-14 DIAGNOSIS — Z85.3 H/O MALIGNANT NEOPLASM OF BREAST: ICD-10-CM

## 2021-06-14 DIAGNOSIS — Z85.3 PERSONAL HISTORY OF BREAST CANCER: ICD-10-CM

## 2021-06-14 DIAGNOSIS — Z12.39 SCREENING BREAST EXAMINATION: ICD-10-CM

## 2021-06-14 DIAGNOSIS — Z08 ENCOUNTER FOR FOLLOW-UP SURVEILLANCE OF BREAST CANCER: Primary | ICD-10-CM

## 2021-06-14 DIAGNOSIS — Z12.31 SCREENING MAMMOGRAM, ENCOUNTER FOR: ICD-10-CM

## 2021-06-14 DIAGNOSIS — Z12.31 ENCOUNTER FOR SCREENING MAMMOGRAM FOR MALIGNANT NEOPLASM OF BREAST: Primary | ICD-10-CM

## 2021-06-14 DIAGNOSIS — Z85.3 ENCOUNTER FOR FOLLOW-UP SURVEILLANCE OF BREAST CANCER: Primary | ICD-10-CM

## 2021-06-14 PROCEDURE — 99213 OFFICE O/P EST LOW 20 MIN: CPT | Performed by: SURGERY

## 2021-06-14 PROCEDURE — G8427 DOCREV CUR MEDS BY ELIG CLIN: HCPCS | Performed by: SURGERY

## 2021-06-14 PROCEDURE — G8419 CALC BMI OUT NRM PARAM NOF/U: HCPCS | Performed by: SURGERY

## 2021-06-14 PROCEDURE — 1036F TOBACCO NON-USER: CPT | Performed by: SURGERY

## 2021-06-14 PROCEDURE — 3017F COLORECTAL CA SCREEN DOC REV: CPT | Performed by: SURGERY

## 2021-06-14 PROCEDURE — 77063 BREAST TOMOSYNTHESIS BI: CPT

## 2021-06-14 NOTE — TELEPHONE ENCOUNTER
----- Message from Roni Arvizu sent at 6/14/2021 12:43 PM EDT -----  Subject: Message to Provider    QUESTIONS  Information for Provider? pt was seen on 6/9/21 for cough and chest   tightness. she was treated for the cold. She was seen by CHRISTINE Paris and was told to give her a call if the cough has not gotten   better. please advise. pt said you can text the mobile number   [phone]. please try mobile first then landline after 3pm today. 6/14/21.  ---------------------------------------------------------------------------  --------------  CALL BACK INFO  What is the best way for the office to contact you? Do not leave any   message, patient will call back for answer  Preferred Call Back Phone Number? [phone]  ---------------------------------------------------------------------------  --------------  SCRIPT ANSWERS  Relationship to Patient?  Self

## 2021-06-14 NOTE — TELEPHONE ENCOUNTER
I recommend that she be tested for COVID, even if she has been vaccinated. Please see what symptoms she is having- cough and chest tightness are the only things that I see in the note.      Thanks saw

## 2021-06-15 ENCOUNTER — TELEPHONE (OUTPATIENT)
Dept: INTERNAL MEDICINE CLINIC | Age: 61
End: 2021-06-15

## 2021-06-15 NOTE — TELEPHONE ENCOUNTER
----- Message from Nikita Jimenez sent at 6/15/2021  4:33 PM EDT -----  Subject: Message to Provider    QUESTIONS  Information for Provider? Pt wants office and Dr Kayla Richardson to know that her   COVID test came back negative and the cough is confirmed not due to   corona. ---------------------------------------------------------------------------  --------------  [employer]  What is the best way for the office to contact you? OK to leave message on   voicemail  Preferred Call Back Phone Number? [phone]  ---------------------------------------------------------------------------  --------------  SCRIPT ANSWERS  Relationship to Patient?  Self

## 2021-06-16 ENCOUNTER — OFFICE VISIT (OUTPATIENT)
Dept: INTERNAL MEDICINE CLINIC | Age: 61
End: 2021-06-16
Payer: COMMERCIAL

## 2021-06-16 VITALS
WEIGHT: 143.4 LBS | SYSTOLIC BLOOD PRESSURE: 146 MMHG | DIASTOLIC BLOOD PRESSURE: 76 MMHG | OXYGEN SATURATION: 94 % | HEART RATE: 116 BPM | BODY MASS INDEX: 26.23 KG/M2

## 2021-06-16 DIAGNOSIS — R05.8 DRY COUGH: ICD-10-CM

## 2021-06-16 DIAGNOSIS — J30.2 SEASONAL ALLERGIES: Primary | ICD-10-CM

## 2021-06-16 PROCEDURE — G8419 CALC BMI OUT NRM PARAM NOF/U: HCPCS | Performed by: INTERNAL MEDICINE

## 2021-06-16 PROCEDURE — 3017F COLORECTAL CA SCREEN DOC REV: CPT | Performed by: INTERNAL MEDICINE

## 2021-06-16 PROCEDURE — 99213 OFFICE O/P EST LOW 20 MIN: CPT | Performed by: INTERNAL MEDICINE

## 2021-06-16 PROCEDURE — 1036F TOBACCO NON-USER: CPT | Performed by: INTERNAL MEDICINE

## 2021-06-16 PROCEDURE — G8427 DOCREV CUR MEDS BY ELIG CLIN: HCPCS | Performed by: INTERNAL MEDICINE

## 2021-06-16 RX ORDER — ECHINACEA PURPUREA EXTRACT 125 MG
1 TABLET ORAL PRN
Qty: 1 BOTTLE | Refills: 3 | Status: SHIPPED | OUTPATIENT
Start: 2021-06-16 | End: 2021-06-17 | Stop reason: SDUPTHER

## 2021-06-16 RX ORDER — MONTELUKAST SODIUM 10 MG/1
10 TABLET ORAL DAILY
Qty: 30 TABLET | Refills: 3 | Status: SHIPPED | OUTPATIENT
Start: 2021-06-16 | End: 2021-07-12 | Stop reason: SDUPTHER

## 2021-06-16 NOTE — PATIENT INSTRUCTIONS
When you come in from doing yard work, shower immediately and rinse your nose out with saline nose spray. This will wash out pollen and grass. Continue Claritin and Flonase (if using Flonase with [employer], use the [employer] first so that you do not wash the Flonase out of your nose). Add Singular (montelukast) 10 mg 1 daily for 2 weeks. This should help with your allergies and cough. After 2 weeks you may take this on an as needed basis. You may take this with your Claritin and should not interact with any of your other medications. Continue with Robitussin DM- try taking it at bedtime as well.

## 2021-06-16 NOTE — PROGRESS NOTES
Patient: Clare Brown is a 63 y.o. female who presents today with the following Chief Complaint(s):  Chief Complaint   Patient presents with    Cough       HPI     Here today for acute appointment. C/o dry cough starting on Sunday after mowing her lawn on Saturday. Started with a cough and allergy symptoms on Saturday. Cough has been persistent. Cough seems to be worse in the morning and evenings. Did see Karyle Lance last week who started her on Claritin and Flonase. Allergy symptoms improved but cough did not. Was tested for COVID and was negative. Cough is starting to do better the past 2 days. Cough is worse when she lays flat, better when she elevates her head or lays on her side. No fevers. Is taking Robitussin DM prn when she starts to cough. Is also using sugar free cough drops. Allergies   Allergen Reactions    Cefdinir Other (See Comments)     *Cephalosporins*: Pruritus.  Erythromycin Swelling     Throat swelling.  Ezetimibe-Simvastatin Other (See Comments)     Muscle cramps.  Lisinopril Other (See Comments)     Cough.  Niacin And Related Other (See Comments)     Niacin (antihyperlipidemic) *Antihyperlipidmics*: S.E.   Bethena Lobstein Other Other (See Comments)     darvocet    Pcn [Penicillins] Hives    Zithromax [Azithromycin] Swelling     Throat swelling.        Past Medical History:   Diagnosis Date    Allergic rhinitis, cause unspecified     Cancer ([facility] [address]     left breast    Generalized osteoarthrosis, involving multiple sites     Pure hypercholesterolemia     Pure hyperglyceridemia     Type II or unspecified type diabetes mellitus without mention of complication, uncontrolled     Unspecified essential hypertension       Past Surgical History:   Procedure Laterality Date    CHOLECYSTECTOMY, LAPAROSCOPIC N/A 12/19/2018    LAPAROSCOPIC CHOLECYSTECTOMY; CHOLANGIOGRAM performed by Missy Adorno MD at [address] [place] [address],[address]      colonoscopy ordered 6/2010; 5/2011; 11/2011, recommended again and refused at this time on 3/30/12 & 4/2012     COLONOSCOPY N/A 11/29/2018    COLONOSCOPY POLYPECTOMY SNARE/COLD BIOPSY performed by Jailyn Christina MD at [address] Left 04/18/2019    Skin sparing mastectomy Sentinal Lymph node Bx Tissue expander    NASAL SEPTUM SURGERY      TONSILLECTOMY      WRIST SURGERY Right       Social History     Socioeconomic History    Marital status: Single     Spouse name: Not on file    Number of children: Not on file    Years of education: Not on file    Highest education level: Not on file   Occupational History    Not on file   Tobacco Use    Smoking status: Never Smoker    Smokeless tobacco: Never Used   Vaping Use    Vaping Use: Never used   Substance and Sexual Activity    Alcohol use: No     Alcohol/week: 0.0 standard drinks    Drug use: No    Sexual activity: Not on file   Other Topics Concern    Not on file   Social History Narrative    Not on file     Social Determinants of Health     Financial Resource Strain:     Difficulty of Paying Living Expenses:    Food Insecurity:     Worried About Running Out of Food in the Last Year:     Ran Out of Food in the Last Year:    Transportation Needs:     Lack of Transportation (Medical):      Lack of Transportation (Non-Medical):    Physical Activity:     Days of Exercise per Week:     Minutes of Exercise per Session:    Stress:     Feeling of Stress :    Social Connections:     Frequency of Communication with Friends and Family:     Frequency of Social Gatherings with Friends and Family:     Attends Yarsani Services:     Active Member of Clubs or Organizations:     Attends Club or Organization Meetings:     Marital Status:    Intimate Partner Violence:     Fear of Current or Ex-Partner:     Emotionally Abused:     Physically Abused:     Sexually Abused:      Family History   Problem Relation Age of Onset    Asthma Maternal Grandmother     Hypertension Maternal Grandmother     Breast Cancer Maternal Grandmother 76    Heart Disease Maternal Grandfather     Hypertension Paternal Grandmother     Heart Disease Paternal Grandfather     Hypertension Paternal Grandfather     COPD Father         Outpatient Medications Prior to Visit   Medication Sig Dispense Refill    loratadine (CLARITIN) 10 MG tablet Take 1 tablet by mouth daily 90 tablet 0    fluticasone (FLONASE) 50 MCG/ACT nasal spray 1 spray by Each Nostril route daily 1 Bottle 0    glipiZIDE (GLUCOTROL) 5 MG tablet TAKE ONE TABLET BY MOUTH TWICE A DAY BEFORE A MEAL 60 tablet 4    Insulin Pen Needle ([employer] PEN NEEDLES 31G) 31G X 8 MM MISC USE ONCE DAILY 100 each 0    LANTUS SOLOSTAR 100 UNIT/ML injection pen INJECT 60 UNITS UNDER THE SKIN NIGHTLY 5 pen 5    insulin glargine (LANTUS SOLOSTAR) 100 UNIT/ML injection pen Inject 60 Units into the skin nightly 10 pen 5    Dulaglutide (TRULICITY) 1.5 CS/6.3AY SOPN Inject 1.5 mg into the skin once a week 4 pen 5    metFORMIN (GLUCOPHAGE) 850 MG tablet Take 1 tablet by mouth 2 times daily (with meals) 180 tablet 1    empagliflozin (JARDIANCE) 25 MG tablet Take 1 tablet by mouth daily 90 tablet 1    valsartan (DIOVAN) 320 MG tablet Take 1 tablet by mouth daily 90 tablet 1    omeprazole (PRILOSEC) 40 MG delayed release capsule Take 1 capsule by mouth every morning (before breakfast) 90 capsule 1    rosuvastatin (CRESTOR) 10 MG tablet Take 1 tablet by mouth daily 90 tablet 1    blood glucose monitor kit and supplies Test BID times a day & as needed for symptoms of irregular blood glucose. 1 kit 0    blood glucose monitor strips Test BID  times a day & as needed for symptoms of irregular blood glucose.  100 strip 5    Lancets MISC 1 each by Does not apply route 2 times daily 100 each 5    vitamin C (ASCORBIC ACID) 500 MG tablet Take 500 mg by mouth daily      CALCIUM PO Take by mouth 2 times daily      Denosumab (PROLIA SC) Inject into the skin every 6 months      anastrozole (ARIMIDEX) 1 MG tablet Take 1 mg by mouth nightly      Simethicone 180 MG CAPS Take by mouth 2 times daily as needed      Cholecalciferol (VITAMIN D3) 2000 units CAPS Take by mouth daily       Probiotic Product (PROBIOTIC DAILY PO) Take by mouth daily       Lancet Devices (SOFT TOUCH LANCET DEVICE) MISC by Does not apply route daily. No facility-administered medications prior to visit. Patient'spast medical history, surgical history, family history, medications,  and allergies  were all reviewed and updated as appropriate today. Review of Systems    BP (!) 146/76   Pulse 116   Wt 143 lb 6.4 oz (65 kg)   SpO2 94%   BMI 26.23 kg/m²   Physical Exam  Vitals and nursing note reviewed. Constitutional:       Appearance: She is well-developed. She is not toxic-appearing. HENT:      Head: Normocephalic. Right Ear: Tympanic membrane, ear canal and external ear normal.      Left Ear: Tympanic membrane, ear canal and external ear normal.      Nose: No congestion or rhinorrhea. Mouth/Throat:      Mouth: Mucous membranes are dry. Pharynx: Oropharynx is clear. Eyes:      General: No scleral icterus. Extraocular Movements: Extraocular movements intact. Conjunctiva/sclera: Conjunctivae normal.      Pupils: Pupils are equal, round, and reactive to light. Neck:      Thyroid: No thyroid mass or thyromegaly. Vascular: No carotid bruit. Cardiovascular:      Rate and Rhythm: Normal rate and regular rhythm. Pulses:           Dorsalis pedis pulses are 2+ on the right side and 2+ on the left side. Heart sounds: Normal heart sounds. No murmur heard. Comments: No LE edema  Pulmonary:      Effort: Pulmonary effort is normal.      Breath sounds: Normal breath sounds. Lymphadenopathy:      Cervical: No cervical adenopathy. Neurological:      Mental Status: She is alert.    Psychiatric:         Mood and Affect: Mood normal.         Behavior: Behavior normal. Behavior is cooperative. ASSESSMENT/PLAN:    Problem List Items Addressed This Visit     Dry cough     Suspect related to allergies. Covid test was negative. Add Singulair 10 mg daily. Continue Claritin and Flonase. Seasonal allergies - Primary      Continue Claritin and Flonase. Given persistent dry cough we will also add Singulair 10 mg daily. Current Outpatient Medications   Medication Sig Dispense Refill    montelukast (SINGULAIR) 10 MG tablet Take 1 tablet by mouth daily 30 tablet 3    loratadine (CLARITIN) 10 MG tablet Take 1 tablet by mouth daily 90 tablet 0    fluticasone (FLONASE) 50 MCG/ACT nasal spray 1 spray by Each Nostril route daily 1 Bottle 0    glipiZIDE (GLUCOTROL) 5 MG tablet TAKE ONE TABLET BY MOUTH TWICE A DAY BEFORE A MEAL 60 tablet 4    Insulin Pen Needle ([employer] PEN NEEDLES 31G) 31G X 8 MM MISC USE ONCE DAILY 100 each 0    LANTUS SOLOSTAR 100 UNIT/ML injection pen INJECT 60 UNITS UNDER THE SKIN NIGHTLY 5 pen 5    insulin glargine (LANTUS SOLOSTAR) 100 UNIT/ML injection pen Inject 60 Units into the skin nightly 10 pen 5    Dulaglutide (TRULICITY) 1.5 VN/6.5LG SOPN Inject 1.5 mg into the skin once a week 4 pen 5    metFORMIN (GLUCOPHAGE) 850 MG tablet Take 1 tablet by mouth 2 times daily (with meals) 180 tablet 1    empagliflozin (JARDIANCE) 25 MG tablet Take 1 tablet by mouth daily 90 tablet 1    valsartan (DIOVAN) 320 MG tablet Take 1 tablet by mouth daily 90 tablet 1    omeprazole (PRILOSEC) 40 MG delayed release capsule Take 1 capsule by mouth every morning (before breakfast) 90 capsule 1    rosuvastatin (CRESTOR) 10 MG tablet Take 1 tablet by mouth daily 90 tablet 1    blood glucose monitor kit and supplies Test BID times a day & as needed for symptoms of irregular blood glucose. 1 kit 0    blood glucose monitor strips Test BID  times a day & as needed for symptoms of irregular blood glucose.  100 strip 5    Lancets MISC 1 each by Does not apply route 2 times daily 100 each 5    vitamin C (ASCORBIC ACID) 500 MG tablet Take 500 mg by mouth daily      CALCIUM PO Take by mouth 2 times daily      Denosumab (PROLIA SC) Inject into the skin every 6 months      anastrozole (ARIMIDEX) 1 MG tablet Take 1 mg by mouth nightly      Simethicone 180 MG CAPS Take by mouth 2 times daily as needed      Cholecalciferol (VITAMIN D3) 2000 units CAPS Take by mouth daily       Probiotic Product (PROBIOTIC DAILY PO) Take by mouth daily       Lancet Devices (SOFT TOUCH LANCET DEVICE) MISC by Does not apply route daily.  sodium chloride (ALTAMIST SPRAY) 0.65 % nasal spray 1 spray by Nasal route daily as needed for Congestion 1 Bottle 3     No current facility-administered medications for this visit. Return if symptoms worsen or fail to improve.

## 2021-06-17 RX ORDER — ECHINACEA PURPUREA EXTRACT 125 MG
1 TABLET ORAL DAILY PRN
Qty: 1 BOTTLE | Refills: 3 | Status: SHIPPED | OUTPATIENT
Start: 2021-06-17 | End: 2021-10-06

## 2021-06-20 PROBLEM — R05.8 DRY COUGH: Status: ACTIVE | Noted: 2021-06-20

## 2021-06-20 PROBLEM — J30.2 SEASONAL ALLERGIES: Status: ACTIVE | Noted: 2021-06-20

## 2021-06-21 NOTE — ASSESSMENT & PLAN NOTE
Suspect related to allergies. Covid test was negative. Add Singulair 10 mg daily. Continue Claritin and Flonase.

## 2021-06-26 DIAGNOSIS — E11.9 TYPE 2 DIABETES MELLITUS WITHOUT COMPLICATION, WITHOUT LONG-TERM CURRENT USE OF INSULIN (HCC): ICD-10-CM

## 2021-06-28 RX ORDER — EMPAGLIFLOZIN 25 MG/1
TABLET, FILM COATED ORAL
Qty: 90 TABLET | Refills: 0 | Status: SHIPPED | OUTPATIENT
Start: 2021-06-28 | End: 2021-07-12 | Stop reason: SDUPTHER

## 2021-06-28 RX ORDER — ROSUVASTATIN CALCIUM 10 MG/1
TABLET, COATED ORAL
Qty: 90 TABLET | Refills: 0 | Status: SHIPPED | OUTPATIENT
Start: 2021-06-28 | End: 2021-09-01

## 2021-06-28 RX ORDER — OMEPRAZOLE 40 MG/1
CAPSULE, DELAYED RELEASE ORAL
Qty: 90 CAPSULE | Refills: 0 | Status: SHIPPED | OUTPATIENT
Start: 2021-06-28 | End: 2021-07-12 | Stop reason: SDUPTHER

## 2021-06-28 RX ORDER — DULAGLUTIDE 1.5 MG/.5ML
INJECTION, SOLUTION SUBCUTANEOUS
Qty: 3 PEN | Refills: 4 | Status: SHIPPED | OUTPATIENT
Start: 2021-06-28 | End: 2021-07-12 | Stop reason: SDUPTHER

## 2021-06-28 RX ORDER — VALSARTAN 320 MG/1
TABLET ORAL
Qty: 90 TABLET | Refills: 0 | Status: SHIPPED | OUTPATIENT
Start: 2021-06-28 | End: 2021-07-12 | Stop reason: SDUPTHER

## 2021-07-01 RX ORDER — PEN NEEDLE, DIABETIC 31 GX5/16"
NEEDLE, DISPOSABLE MISCELLANEOUS
Qty: 100 EACH | Refills: 0 | Status: SHIPPED | OUTPATIENT
Start: 2021-07-01 | End: 2021-07-12

## 2021-07-06 DIAGNOSIS — R05.9 COUGH: ICD-10-CM

## 2021-07-06 DIAGNOSIS — R06.7 SNEEZING: ICD-10-CM

## 2021-07-06 DIAGNOSIS — R09.81 NASAL CONGESTION: ICD-10-CM

## 2021-07-06 DIAGNOSIS — J30.89 SEASONAL ALLERGIC RHINITIS DUE TO OTHER ALLERGIC TRIGGER: ICD-10-CM

## 2021-07-06 RX ORDER — FLUTICASONE PROPIONATE 50 MCG
SPRAY, SUSPENSION (ML) NASAL
Qty: 1 BOTTLE | Refills: 0 | Status: SHIPPED | OUTPATIENT
Start: 2021-07-06 | End: 2021-07-12 | Stop reason: SDUPTHER

## 2021-07-12 ENCOUNTER — OFFICE VISIT (OUTPATIENT)
Dept: INTERNAL MEDICINE CLINIC | Age: 61
End: 2021-07-12
Payer: COMMERCIAL

## 2021-07-12 VITALS
DIASTOLIC BLOOD PRESSURE: 74 MMHG | BODY MASS INDEX: 26.08 KG/M2 | HEART RATE: 104 BPM | WEIGHT: 142.6 LBS | OXYGEN SATURATION: 97 % | SYSTOLIC BLOOD PRESSURE: 130 MMHG

## 2021-07-12 DIAGNOSIS — J30.89 SEASONAL ALLERGIC RHINITIS DUE TO OTHER ALLERGIC TRIGGER: ICD-10-CM

## 2021-07-12 DIAGNOSIS — R09.81 NASAL CONGESTION: ICD-10-CM

## 2021-07-12 DIAGNOSIS — R06.7 SNEEZING: ICD-10-CM

## 2021-07-12 DIAGNOSIS — E11.9 TYPE 2 DIABETES MELLITUS WITHOUT COMPLICATION, WITHOUT LONG-TERM CURRENT USE OF INSULIN (HCC): Primary | ICD-10-CM

## 2021-07-12 DIAGNOSIS — R05.9 COUGH: ICD-10-CM

## 2021-07-12 DIAGNOSIS — E78.5 HYPERLIPIDEMIA LDL GOAL <70: ICD-10-CM

## 2021-07-12 DIAGNOSIS — I10 ESSENTIAL HYPERTENSION: ICD-10-CM

## 2021-07-12 PROCEDURE — 1036F TOBACCO NON-USER: CPT | Performed by: INTERNAL MEDICINE

## 2021-07-12 PROCEDURE — G8419 CALC BMI OUT NRM PARAM NOF/U: HCPCS | Performed by: INTERNAL MEDICINE

## 2021-07-12 PROCEDURE — 99214 OFFICE O/P EST MOD 30 MIN: CPT | Performed by: INTERNAL MEDICINE

## 2021-07-12 PROCEDURE — 3017F COLORECTAL CA SCREEN DOC REV: CPT | Performed by: INTERNAL MEDICINE

## 2021-07-12 PROCEDURE — G8427 DOCREV CUR MEDS BY ELIG CLIN: HCPCS | Performed by: INTERNAL MEDICINE

## 2021-07-12 PROCEDURE — 3046F HEMOGLOBIN A1C LEVEL >9.0%: CPT | Performed by: INTERNAL MEDICINE

## 2021-07-12 PROCEDURE — 83036 HEMOGLOBIN GLYCOSYLATED A1C: CPT | Performed by: INTERNAL MEDICINE

## 2021-07-12 PROCEDURE — 2022F DILAT RTA XM EVC RTNOPTHY: CPT | Performed by: INTERNAL MEDICINE

## 2021-07-12 RX ORDER — VALSARTAN 320 MG/1
320 TABLET ORAL 2 TIMES DAILY
Qty: 30 TABLET | Refills: 5 | Status: SHIPPED | OUTPATIENT
Start: 2021-07-12 | End: 2021-11-01

## 2021-07-12 RX ORDER — EMPAGLIFLOZIN 25 MG/1
25 TABLET, FILM COATED ORAL DAILY
Qty: 30 TABLET | Refills: 5 | Status: SHIPPED | OUTPATIENT
Start: 2021-07-12 | End: 2022-03-29

## 2021-07-12 RX ORDER — DULAGLUTIDE 1.5 MG/.5ML
1.5 INJECTION, SOLUTION SUBCUTANEOUS WEEKLY
Qty: 4 PEN | Refills: 5 | Status: SHIPPED | OUTPATIENT
Start: 2021-07-12 | End: 2021-11-18 | Stop reason: SDUPTHER

## 2021-07-12 RX ORDER — OMEPRAZOLE 40 MG/1
40 CAPSULE, DELAYED RELEASE ORAL DAILY
Qty: 30 CAPSULE | Refills: 5 | Status: SHIPPED | OUTPATIENT
Start: 2021-07-12 | End: 2022-05-02

## 2021-07-12 RX ORDER — GLIPIZIDE 5 MG/1
5 TABLET ORAL
Qty: 60 TABLET | Refills: 5 | Status: SHIPPED | OUTPATIENT
Start: 2021-07-12 | End: 2022-07-05

## 2021-07-12 RX ORDER — PEN NEEDLE, DIABETIC 31 G X1/4"
1 NEEDLE, DISPOSABLE MISCELLANEOUS DAILY
Qty: 100 EACH | Refills: 3 | Status: SHIPPED | OUTPATIENT
Start: 2021-07-12 | End: 2021-09-14 | Stop reason: SDUPTHER

## 2021-07-12 RX ORDER — INSULIN GLARGINE 100 [IU]/ML
60 INJECTION, SOLUTION SUBCUTANEOUS NIGHTLY
Qty: 10 PEN | Refills: 5 | Status: SHIPPED | OUTPATIENT
Start: 2021-07-12 | End: 2022-06-10

## 2021-07-12 RX ORDER — MONTELUKAST SODIUM 10 MG/1
10 TABLET ORAL NIGHTLY PRN
Qty: 30 TABLET | Refills: 5 | Status: SHIPPED | OUTPATIENT
Start: 2021-07-12 | End: 2022-03-29

## 2021-07-12 RX ORDER — FLUTICASONE PROPIONATE 50 MCG
2 SPRAY, SUSPENSION (ML) NASAL DAILY
Qty: 1 BOTTLE | Refills: 5 | Status: SHIPPED | OUTPATIENT
Start: 2021-07-12 | End: 2022-03-02

## 2021-07-12 NOTE — PROGRESS NOTES
miPatient: Krishna Julian is a 63 y.o. female who presents today with the following Chief Complaint(s):  Chief Complaint   Patient presents with    3 Month Follow-Up       HPI     Here today for follow up. Still having a dry cough. Started feeling short of breath as well and Dr. Steffanie Hiar did order a CT of her chest. Only notices if she is wearing a mask. Has also lost weight. CT abdomen and pelvis and chest have been scheduled for 7/19/21. Does feel like Singulair with Claritin and Flonase are helping with her cough and allergies. DM- has not been monitoring her blood sugars. Has been taking all of her medicines: Trulicity, Jardiance, metformin, glipizide, and Lantus 60 units. No low sugars. HTN- no issues with Diovan. HLD-remains on Crestor. Is overdue for labs. Does get anxious \"sometimes\". POCT HbA1c 6.4%    Allergies   Allergen Reactions    Cefdinir Other (See Comments)     *Cephalosporins*: Pruritus.  Erythromycin Swelling     Throat swelling.  Ezetimibe-Simvastatin Other (See Comments)     Muscle cramps.  Lisinopril Other (See Comments)     Cough.  Niacin And Related Other (See Comments)     Niacin (antihyperlipidemic) *Antihyperlipidmics*: S..   [facility] Other Other (See Comments)     darvocet    Pcn [Penicillins] Hives    Zithromax [Azithromycin] Swelling     Throat swelling.        Past Medical History:   Diagnosis Date    Allergic rhinitis, cause unspecified     Cancer ([facility] [address]     left breast    Generalized osteoarthrosis, involving multiple sites     Pure hypercholesterolemia     Pure hyperglyceridemia     Type II or unspecified type diabetes mellitus without mention of complication, uncontrolled     Unspecified essential hypertension       Past Surgical History:   Procedure Laterality Date    CHOLECYSTECTOMY, LAPAROSCOPIC N/A 12/19/2018    LAPAROSCOPIC CHOLECYSTECTOMY; CHOLANGIOGRAM performed by Vikas Pinto MD at [address] [place] [address] colonoscopy ordered 6/2010; 5/2011; 11/2011, recommended again and refused at this time on 3/30/12 & 4/2012     COLONOSCOPY N/A 11/29/2018    COLONOSCOPY POLYPECTOMY SNARE/COLD BIOPSY performed by Tessa Sylvester MD at [address] Left 04/18/2019    Skin sparing mastectomy Sentinal Lymph node Bx Tissue expander    NASAL SEPTUM SURGERY      TONSILLECTOMY      WRIST SURGERY Right       Social History     Socioeconomic History    Marital status: Single     Spouse name: Not on file    Number of children: Not on file    Years of education: Not on file    Highest education level: Not on file   Occupational History    Not on file   Tobacco Use    Smoking status: Never Smoker    Smokeless tobacco: Never Used   Vaping Use    Vaping Use: Never used   Substance and Sexual Activity    Alcohol use: No     Alcohol/week: 0.0 standard drinks    Drug use: No    Sexual activity: Not on file   Other Topics Concern    Not on file   Social History Narrative    Not on file     Social Determinants of Health     Financial Resource Strain:     Difficulty of Paying Living Expenses:    Food Insecurity:     Worried About Running Out of Food in the Last Year:     Ran Out of Food in the Last Year:    Transportation Needs:     Lack of Transportation (Medical):      Lack of Transportation (Non-Medical):    Physical Activity:     Days of Exercise per Week:     Minutes of Exercise per Session:    Stress:     Feeling of Stress :    Social Connections:     Frequency of Communication with Friends and Family:     Frequency of Social Gatherings with Friends and Family:     Attends Congregation Services:     Active Member of Clubs or Organizations:     Attends Club or Organization Meetings:     Marital Status:    Intimate Partner Violence:     Fear of Current or Ex-Partner:     Emotionally Abused:     Physically Abused:     Sexually Abused:      Family History   Problem Relation Age of Onset    Asthma Maternal Grandmother     Hypertension Maternal Grandmother     Breast Cancer Maternal Grandmother 76    Heart Disease Maternal Grandfather     Hypertension Paternal Grandmother     Heart Disease Paternal Grandfather     Hypertension Paternal Grandfather     COPD Father         Outpatient Medications Prior to Visit   Medication Sig Dispense Refill    rosuvastatin (CRESTOR) 10 MG tablet TAKE ONE TABLET BY MOUTH DAILY 90 tablet 0    sodium chloride (ALTAMIST SPRAY) 0.65 % nasal spray 1 spray by Nasal route daily as needed for Congestion 1 Bottle 3    loratadine (CLARITIN) 10 MG tablet Take 1 tablet by mouth daily 90 tablet 0    blood glucose monitor kit and supplies Test BID times a day & as needed for symptoms of irregular blood glucose. 1 kit 0    blood glucose monitor strips Test BID  times a day & as needed for symptoms of irregular blood glucose. 100 strip 5    Lancets MISC 1 each by Does not apply route 2 times daily 100 each 5    vitamin C (ASCORBIC ACID) 500 MG tablet Take 500 mg by mouth daily      CALCIUM PO Take by mouth 2 times daily      Denosumab (PROLIA SC) Inject into the skin every 6 months      anastrozole (ARIMIDEX) 1 MG tablet Take 1 mg by mouth nightly      Simethicone 180 MG CAPS Take by mouth 2 times daily as needed      Cholecalciferol (VITAMIN D3) 2000 units CAPS Take by mouth daily       Probiotic Product (PROBIOTIC DAILY PO) Take by mouth daily       Lancet Devices (SOFT TOUCH LANCET DEVICE) MISC by Does not apply route daily.       fluticasone (FLONASE) 50 MCG/ACT nasal spray SPRAY ONE SPRAY IN EACH NOSTRIL ONCE DAILY 1 Bottle 0    [employer] PEN NEEDLES 31G 31G X 8 MM MISC USE ONCE DAILY 967 each 0    TRULICITY 1.5 XP/9.1NE SOPN INJECT 1.5 MG UNDER THE SKIN ONCE WEEKLY 3 pen 4    omeprazole (PRILOSEC) 40 MG delayed release capsule TAKE ONE CAPSULE BY MOUTH EVERY MORNING BEFORE BREAKFAST 90 capsule 0    JARDIANCE 25 MG tablet TAKE ONE TABLET BY MOUTH DAILY 90 tablet 0 mass or thyromegaly. Vascular: No carotid bruit. Cardiovascular:      Rate and Rhythm: Normal rate and regular rhythm. Pulses:           Dorsalis pedis pulses are 2+ on the right side and 2+ on the left side. Posterior tibial pulses are 2+ on the right side and 2+ on the left side. Heart sounds: Normal heart sounds. No murmur heard. Comments: No LE edema  Pulmonary:      Effort: Pulmonary effort is normal.      Breath sounds: Normal breath sounds. Abdominal:      Palpations: Abdomen is soft. Tenderness: There is no abdominal tenderness. Musculoskeletal:      Right foot: Normal range of motion. No deformity, bunion, Charcot foot, foot drop or prominent metatarsal heads. Left foot: Normal range of motion. No deformity, bunion, Charcot foot, foot drop or prominent metatarsal heads. Feet:      Right foot:      Protective Sensation: 10 sites tested. 10 sites sensed. Skin integrity: Skin integrity normal.      Toenail Condition: Right toenails are normal.      Left foot:      Protective Sensation: 10 sites tested. 10 sites sensed. Skin integrity: Skin integrity normal.      Toenail Condition: Left toenails are normal.   Lymphadenopathy:      Cervical: No cervical adenopathy. Neurological:      Mental Status: She is alert. Psychiatric:         Mood and Affect: Affect normal. Mood is anxious. Behavior: Behavior normal. Behavior is cooperative. ASSESSMENT/PLAN:    Problem List Items Addressed This Visit     Allergic rhinitis      Continue Claritin, Singulair, and Flonase. Is having improvement in symptoms. Relevant Medications    fluticasone (FLONASE) 50 MCG/ACT nasal spray    montelukast (SINGULAIR) 10 MG tablet    Cough     Continue with Claritin, Flonase, and Singulair. Seems to be helping. Dr. Snow Dewey has ordered a CT of her chest which is scheduled for 7/19/2021.          Relevant Medications    fluticasone (FLONASE) 50 MCG/ACT nasal spray    Essential hypertension      Well-controlled on valsartan 320 mg daily. Continue. Hyperlipidemia LDL goal <70      Continue Crestor 10 mg daily. Check lipid panel, CMP. Relevant Medications    valsartan (DIOVAN) 320 MG tablet    Other Relevant Orders    Lipid Panel (Completed)    COMPREHENSIVE METABOLIC PANEL (Completed)    TSH with Reflex (Completed)    Lipid Panel    Nasal congestion      Continue with Claritin, Flonase, and Singulair. Seems to be helping. Relevant Medications    fluticasone (FLONASE) 50 MCG/ACT nasal spray    Sneezing     Continue with Claritin, Flonase, and Singulair. Seems to be helping. Relevant Medications    fluticasone (FLONASE) 50 MCG/ACT nasal spray    Type 2 diabetes mellitus without complication, without long-term current use of insulin ([facility]) - Primary     POCT hemoglobin A1c was 6.4%.   Continue Trulicity 1.5 mg weekly, Jardiance 25 mg daily, Metformin 850 mg twice daily, glipizide 5 mg twice daily, and Lantus 60 units         Relevant Medications    Dulaglutide (TRULICITY) 1.5 XE/2.0IL SOPN    empagliflozin (JARDIANCE) 25 MG tablet    metFORMIN (GLUCOPHAGE) 850 MG tablet    insulin glargine (LANTUS SOLOSTAR) 100 UNIT/ML injection pen    glipiZIDE (GLUCOTROL) 5 MG tablet    Other Relevant Orders    POCT glycosylated hemoglobin (Hb A1C)    COMPREHENSIVE METABOLIC PANEL (Completed)     DIABETES FOOT EXAM (Completed)    Comprehensive Metabolic Panel    Hemoglobin A1C    Microalbumin / Creatinine Urine Ratio (Completed)          Current Outpatient Medications   Medication Sig Dispense Refill    Dulaglutide (TRULICITY) 1.5 LT/3.4TJ SOPN Inject 1.5 mg into the skin once a week 4 pen 5    empagliflozin (JARDIANCE) 25 MG tablet Take 25 mg by mouth daily 30 tablet 5    metFORMIN (GLUCOPHAGE) 850 MG tablet Take 1 tablet by mouth 2 times daily (with meals) 60 tablet 5    insulin glargine (LANTUS SOLOSTAR) 100 UNIT/ML injection pen Inject 60 Units into the skin nightly 10 pen 5    fluticasone (FLONASE) 50 MCG/ACT nasal spray 2 sprays by Nasal route daily 1 Bottle 5    Insulin Pen Needle (PEN NEEDLES) 31G X 6 MM MISC 1 each by Does not apply route daily 100 each 3    omeprazole (PRILOSEC) 40 MG delayed release capsule Take 1 capsule by mouth daily 30 capsule 5    valsartan (DIOVAN) 320 MG tablet Take 1 tablet by mouth 2 times daily 30 tablet 5    montelukast (SINGULAIR) 10 MG tablet Take 1 tablet by mouth nightly as needed (allergies) 30 tablet 5    glipiZIDE (GLUCOTROL) 5 MG tablet Take 1 tablet by mouth 2 times daily (before meals) 60 tablet 5    rosuvastatin (CRESTOR) 10 MG tablet TAKE ONE TABLET BY MOUTH DAILY 90 tablet 0    sodium chloride (ALTAMIST SPRAY) 0.65 % nasal spray 1 spray by Nasal route daily as needed for Congestion 1 Bottle 3    loratadine (CLARITIN) 10 MG tablet Take 1 tablet by mouth daily 90 tablet 0    blood glucose monitor kit and supplies Test BID times a day & as needed for symptoms of irregular blood glucose. 1 kit 0    blood glucose monitor strips Test BID  times a day & as needed for symptoms of irregular blood glucose. 100 strip 5    Lancets MISC 1 each by Does not apply route 2 times daily 100 each 5    vitamin C (ASCORBIC ACID) 500 MG tablet Take 500 mg by mouth daily      CALCIUM PO Take by mouth 2 times daily      Denosumab (PROLIA SC) Inject into the skin every 6 months      anastrozole (ARIMIDEX) 1 MG tablet Take 1 mg by mouth nightly      Simethicone 180 MG CAPS Take by mouth 2 times daily as needed      Cholecalciferol (VITAMIN D3) 2000 units CAPS Take by mouth daily       Probiotic Product (PROBIOTIC DAILY PO) Take by mouth daily       Lancet Devices (SOFT TOUCH LANCET DEVICE) MISC by Does not apply route daily. No current facility-administered medications for this visit. Return in about 4 months (around 11/12/2021) for labs prior.

## 2021-07-13 DIAGNOSIS — E11.9 TYPE 2 DIABETES MELLITUS WITHOUT COMPLICATION, WITHOUT LONG-TERM CURRENT USE OF INSULIN (HCC): ICD-10-CM

## 2021-07-13 DIAGNOSIS — E78.5 HYPERLIPIDEMIA LDL GOAL <70: ICD-10-CM

## 2021-07-13 LAB
A/G RATIO: 1.3 (ref 1.1–2.2)
ALBUMIN SERPL-MCNC: 4.3 G/DL (ref 3.4–5)
ALP BLD-CCNC: 96 U/L (ref 40–129)
ALT SERPL-CCNC: 10 U/L (ref 10–40)
ANION GAP SERPL CALCULATED.3IONS-SCNC: 17 MMOL/L (ref 3–16)
AST SERPL-CCNC: 17 U/L (ref 15–37)
BILIRUB SERPL-MCNC: 0.9 MG/DL (ref 0–1)
BUN BLDV-MCNC: 7 MG/DL (ref 7–20)
CALCIUM SERPL-MCNC: 9.5 MG/DL (ref 8.3–10.6)
CHLORIDE BLD-SCNC: 106 MMOL/L (ref 99–110)
CHOLESTEROL, TOTAL: 119 MG/DL (ref 0–199)
CO2: 21 MMOL/L (ref 21–32)
CREAT SERPL-MCNC: 0.6 MG/DL (ref 0.6–1.2)
CREATININE URINE: 35.4 MG/DL (ref 28–259)
GFR AFRICAN AMERICAN: >60
GFR NON-AFRICAN AMERICAN: >60
GLOBULIN: 3.3 G/DL
GLUCOSE BLD-MCNC: 98 MG/DL (ref 70–99)
HDLC SERPL-MCNC: 37 MG/DL (ref 40–60)
LDL CHOLESTEROL CALCULATED: 54 MG/DL
MICROALBUMIN UR-MCNC: <1.2 MG/DL
MICROALBUMIN/CREAT UR-RTO: NORMAL MG/G (ref 0–30)
POTASSIUM SERPL-SCNC: 4 MMOL/L (ref 3.5–5.1)
SODIUM BLD-SCNC: 144 MMOL/L (ref 136–145)
TOTAL PROTEIN: 7.6 G/DL (ref 6.4–8.2)
TRIGL SERPL-MCNC: 139 MG/DL (ref 0–150)
TSH REFLEX: 3.17 UIU/ML (ref 0.27–4.2)
VLDLC SERPL CALC-MCNC: 28 MG/DL

## 2021-07-17 PROBLEM — R06.7 SNEEZING: Status: ACTIVE | Noted: 2021-07-17

## 2021-07-17 PROBLEM — R05.9 COUGH: Status: ACTIVE | Noted: 2021-07-17

## 2021-07-17 PROBLEM — R09.81 NASAL CONGESTION: Status: ACTIVE | Noted: 2021-07-17

## 2021-07-17 PROBLEM — R05.8 DRY COUGH: Status: RESOLVED | Noted: 2021-06-20 | Resolved: 2021-07-17

## 2021-07-17 ASSESSMENT — ENCOUNTER SYMPTOMS
CONSTIPATION: 0
DIARRHEA: 0
ABDOMINAL PAIN: 0
SORE THROAT: 0
COUGH: 1
SHORTNESS OF BREATH: 0

## 2021-07-17 NOTE — ASSESSMENT & PLAN NOTE
Continue with Claritin, Flonase, and Singulair. Seems to be helping. Dr. Keira Khan has ordered a CT of her chest which is scheduled for 7/19/2021.

## 2021-07-17 NOTE — ASSESSMENT & PLAN NOTE
POCT hemoglobin A1c was 6.4%.   Continue Trulicity 1.5 mg weekly, Jardiance 25 mg daily, Metformin 850 mg twice daily, glipizide 5 mg twice daily, and Lantus 60 units

## 2021-07-27 ENCOUNTER — HOSPITAL ENCOUNTER (OUTPATIENT)
Dept: CT IMAGING | Age: 61
Discharge: HOME | End: 2021-07-27
Payer: COMMERCIAL

## 2021-07-27 DIAGNOSIS — C50.912 PRIMARY BREAST MALIGNANCY, LEFT (HCC): ICD-10-CM

## 2021-07-27 PROCEDURE — 71260 CT THORAX DX C+: CPT

## 2021-07-27 PROCEDURE — 6360000004 HC RX CONTRAST MEDICATION: Performed by: INTERNAL MEDICINE

## 2021-07-27 PROCEDURE — 74160 CT ABDOMEN W/CONTRAST: CPT

## 2021-07-27 RX ADMIN — IOPAMIDOL 75 ML: 755 INJECTION, SOLUTION INTRAVENOUS at 07:54

## 2021-07-27 RX ADMIN — IOHEXOL 50 ML: 240 INJECTION, SOLUTION INTRATHECAL; INTRAVASCULAR; INTRAVENOUS; ORAL at 07:53

## 2021-08-16 PROBLEM — R05.9 COUGH: Status: RESOLVED | Noted: 2021-07-17 | Resolved: 2021-08-16

## 2021-09-01 RX ORDER — ROSUVASTATIN CALCIUM 10 MG/1
TABLET, COATED ORAL
Qty: 90 TABLET | Refills: 0 | Status: SHIPPED | OUTPATIENT
Start: 2021-09-01 | End: 2021-11-30

## 2021-09-05 DIAGNOSIS — R09.81 NASAL CONGESTION: ICD-10-CM

## 2021-09-05 DIAGNOSIS — J30.89 SEASONAL ALLERGIC RHINITIS DUE TO OTHER ALLERGIC TRIGGER: ICD-10-CM

## 2021-09-05 DIAGNOSIS — R06.7 SNEEZING: ICD-10-CM

## 2021-09-05 DIAGNOSIS — R05.9 COUGH: ICD-10-CM

## 2021-09-07 ENCOUNTER — TELEPHONE (OUTPATIENT)
Dept: INTERNAL MEDICINE CLINIC | Age: 61
End: 2021-09-07

## 2021-09-07 DIAGNOSIS — R05.9 COUGH: ICD-10-CM

## 2021-09-07 DIAGNOSIS — J30.89 SEASONAL ALLERGIC RHINITIS DUE TO OTHER ALLERGIC TRIGGER: ICD-10-CM

## 2021-09-07 DIAGNOSIS — R09.81 NASAL CONGESTION: ICD-10-CM

## 2021-09-07 DIAGNOSIS — R06.7 SNEEZING: ICD-10-CM

## 2021-09-07 RX ORDER — LORATADINE 10 MG/1
TABLET ORAL
Qty: 30 TABLET | OUTPATIENT
Start: 2021-09-07

## 2021-09-07 RX ORDER — LORATADINE 10 MG/1
10 TABLET ORAL DAILY
Qty: 90 TABLET | Refills: 0 | Status: SHIPPED | OUTPATIENT
Start: 2021-09-07 | End: 2021-11-29

## 2021-09-07 NOTE — TELEPHONE ENCOUNTER
----- Message from Raysa April sent at 9/7/2021  7:36 AM EDT -----  Subject: Refill Request    QUESTIONS  Name of Medication? loratadine (CLARITIN) 10 MG tablet  Patient-reported dosage and instructions? 10MG, 1 Daily  How many days do you have left? 0  Preferred Pharmacy? [facility]  Pharmacy phone number (if available)? [phone]  ---------------------------------------------------------------------------  --------------  Gene Baokimwilfrid PAZ  What is the best way for the office to contact you? OK to leave message on   voicemail  Preferred Call Back Phone Number?  [phone]

## 2021-09-13 DIAGNOSIS — E11.9 TYPE 2 DIABETES MELLITUS WITHOUT COMPLICATION, WITHOUT LONG-TERM CURRENT USE OF INSULIN (HCC): Primary | ICD-10-CM

## 2021-09-14 RX ORDER — PEN NEEDLE, DIABETIC 31 G X1/4"
1 NEEDLE, DISPOSABLE MISCELLANEOUS DAILY
Qty: 100 EACH | Refills: 3 | Status: SHIPPED | OUTPATIENT
Start: 2021-09-14 | End: 2022-02-07

## 2021-10-01 RX ORDER — VALSARTAN 320 MG/1
TABLET ORAL
Qty: 30 TABLET | Refills: 5 | OUTPATIENT
Start: 2021-10-01

## 2021-10-05 ENCOUNTER — TELEPHONE (OUTPATIENT)
Dept: ADMINISTRATIVE | Age: 61
End: 2021-10-05

## 2021-10-05 NOTE — TELEPHONE ENCOUNTER
Submitted PA for Trulicity 6.0CS/1.9AJ pen-injectors Key:  [id number] - PA Case ID: [id number] Via CM Status PENDING

## 2021-10-06 ENCOUNTER — OFFICE VISIT (OUTPATIENT)
Dept: INTERNAL MEDICINE CLINIC | Age: 61
End: 2021-10-06
Payer: COMMERCIAL

## 2021-10-06 ENCOUNTER — NURSE TRIAGE (OUTPATIENT)
Dept: OTHER | Facility: CLINIC | Age: 61
End: 2021-10-06

## 2021-10-06 VITALS
WEIGHT: 145.2 LBS | OXYGEN SATURATION: 95 % | SYSTOLIC BLOOD PRESSURE: 138 MMHG | TEMPERATURE: 97.7 F | DIASTOLIC BLOOD PRESSURE: 64 MMHG | BODY MASS INDEX: 26.56 KG/M2 | HEART RATE: 96 BPM

## 2021-10-06 DIAGNOSIS — J34.89 SINUS PRESSURE: ICD-10-CM

## 2021-10-06 DIAGNOSIS — R09.81 NASAL CONGESTION: Primary | ICD-10-CM

## 2021-10-06 PROCEDURE — G8484 FLU IMMUNIZE NO ADMIN: HCPCS | Performed by: NURSE PRACTITIONER

## 2021-10-06 PROCEDURE — G8427 DOCREV CUR MEDS BY ELIG CLIN: HCPCS | Performed by: NURSE PRACTITIONER

## 2021-10-06 PROCEDURE — 1036F TOBACCO NON-USER: CPT | Performed by: NURSE PRACTITIONER

## 2021-10-06 PROCEDURE — 3017F COLORECTAL CA SCREEN DOC REV: CPT | Performed by: NURSE PRACTITIONER

## 2021-10-06 PROCEDURE — G8419 CALC BMI OUT NRM PARAM NOF/U: HCPCS | Performed by: NURSE PRACTITIONER

## 2021-10-06 PROCEDURE — 99213 OFFICE O/P EST LOW 20 MIN: CPT | Performed by: NURSE PRACTITIONER

## 2021-10-06 SDOH — ECONOMIC STABILITY: FOOD INSECURITY: WITHIN THE PAST 12 MONTHS, YOU WORRIED THAT YOUR FOOD WOULD RUN OUT BEFORE YOU GOT MONEY TO BUY MORE.: NEVER TRUE

## 2021-10-06 SDOH — ECONOMIC STABILITY: FOOD INSECURITY: WITHIN THE PAST 12 MONTHS, THE FOOD YOU BOUGHT JUST DIDN'T LAST AND YOU DIDN'T HAVE MONEY TO GET MORE.: NEVER TRUE

## 2021-10-06 ASSESSMENT — ENCOUNTER SYMPTOMS
TROUBLE SWALLOWING: 0
SORE THROAT: 1
ABDOMINAL PAIN: 0
SINUS PRESSURE: 1
COUGH: 0
NAUSEA: 0
VOMITING: 0
RHINORRHEA: 1
SHORTNESS OF BREATH: 0
CONSTIPATION: 0
DIARRHEA: 0
WHEEZING: 0
EYE PAIN: 0

## 2021-10-06 ASSESSMENT — SOCIAL DETERMINANTS OF HEALTH (SDOH): HOW HARD IS IT FOR YOU TO PAY FOR THE VERY BASICS LIKE FOOD, HOUSING, MEDICAL CARE, AND HEATING?: NOT HARD AT ALL

## 2021-10-06 NOTE — PROGRESS NOTES
Acute Office Visit  10/6/2021    SUBJECTIVE:    Patient ID: Parul Phillips is a 63 y.o. female. Chief Complaint   Patient presents with    Sinus Problem     left sided sinus pain; started last night     HPI: The patient presents to the office for an acute visit. Patient reports left sided sinus pressure that started yesterday. Left ear pressure also reported. Sore throat present. Denies cough, but states that she clears her throat frequently. States she went to the dentist and she states this is not a tooth problem. Denies fevers or chills. Denies N/V, diarrhea or abdominal pain. Denies CP, SOB or wheezing. Treatment tried and response - claritin, flonase and saline nasal spray and gargling with salt water  Vaccinated for COVID-19. States she had her booster vaccine as well. Recent ill contacts? No  No recent travel  Tobacco use or second hand smoke exposure - no  Condition better, worsening, or stable - stable    Allergies   Allergen Reactions    Cefdinir Other (See Comments)     *Cephalosporins*: Pruritus.  Erythromycin Swelling     Throat swelling.  Ezetimibe-Simvastatin Other (See Comments)     Muscle cramps.  Lisinopril Other (See Comments)     Cough.  Niacin And Related Other (See Comments)     Niacin (antihyperlipidemic) *Antihyperlipidmics*: [place] Other Other (See Comments)     darvocet    Pcn [Penicillins] Hives    Zithromax [Azithromycin] Swelling     Throat swelling.       Current Outpatient Medications   Medication Sig Dispense Refill    sodium chloride (OCEAN, BABY AYR) 0.65 % nasal spray 1 spray by Nasal route as needed for Congestion      Insulin Pen Needle (PEN NEEDLES) 31G X 6 MM MISC 1 each by Does not apply route daily 100 each 3    loratadine (CLARITIN) 10 MG tablet Take 1 tablet by mouth daily 90 tablet 0    rosuvastatin (CRESTOR) 10 MG tablet TAKE ONE TABLET BY MOUTH DAILY 90 tablet 0    Dulaglutide (TRULICITY) 1.5 LW/6.8BI SOPN Inject 1.5 mg into the skin once a week 4 pen 5    empagliflozin (JARDIANCE) 25 MG tablet Take 25 mg by mouth daily 30 tablet 5    metFORMIN (GLUCOPHAGE) 850 MG tablet Take 1 tablet by mouth 2 times daily (with meals) 60 tablet 5    insulin glargine (LANTUS SOLOSTAR) 100 UNIT/ML injection pen Inject 60 Units into the skin nightly 10 pen 5    fluticasone (FLONASE) 50 MCG/ACT nasal spray 2 sprays by Nasal route daily 1 Bottle 5    omeprazole (PRILOSEC) 40 MG delayed release capsule Take 1 capsule by mouth daily 30 capsule 5    valsartan (DIOVAN) 320 MG tablet Take 1 tablet by mouth 2 times daily 30 tablet 5    montelukast (SINGULAIR) 10 MG tablet Take 1 tablet by mouth nightly as needed (allergies) 30 tablet 5    glipiZIDE (GLUCOTROL) 5 MG tablet Take 1 tablet by mouth 2 times daily (before meals) 60 tablet 5    blood glucose monitor kit and supplies Test BID times a day & as needed for symptoms of irregular blood glucose. 1 kit 0    blood glucose monitor strips Test BID  times a day & as needed for symptoms of irregular blood glucose. 100 strip 5    Lancets MISC 1 each by Does not apply route 2 times daily 100 each 5    vitamin C (ASCORBIC ACID) 500 MG tablet Take 500 mg by mouth daily      CALCIUM PO Take by mouth 2 times daily      Denosumab (PROLIA SC) Inject into the skin every 6 months      anastrozole (ARIMIDEX) 1 MG tablet Take 1 mg by mouth nightly      Simethicone 180 MG CAPS Take by mouth 2 times daily as needed      Cholecalciferol (VITAMIN D3) 2000 units CAPS Take by mouth daily       Probiotic Product (PROBIOTIC DAILY PO) Take by mouth daily       Lancet Devices (SOFT TOUCH LANCET DEVICE) MISC by Does not apply route daily. No current facility-administered medications for this visit. Review of Systems   Constitutional: Negative for appetite change, chills, diaphoresis, fatigue and fever.    HENT: Positive for congestion, ear pain, rhinorrhea, sinus pressure and sore throat (and clearing throat sinus problem. Diagnoses and all orders for this visit:    Nasal congestion/Sinus pressure  -     Afebrile. Lungs clear to auscultation. O2 95% on room air.   - Reviewed CDC guidelines. Reviewed COVID-19 testing. Pt states she lives with her mother and she would like to be tested. - Quarantine reviewed. - Symptomatic management reviewed. - Covid-19 Ambulatory; Future  - Pt will call if symptoms worsen or fail to improve  - Red flag warning signs reviewed with the pt and she will go to the ER if these occur. Return for as previously scheduled or sooner if needed. Pt informed to call if symptoms worsen or fail to improve. All questions answered. Patient states no further questions or concerns at this time.     Electronically signed by MAKENZIE Severino CNP 10/06/21

## 2021-10-06 NOTE — TELEPHONE ENCOUNTER
Received call from [address] Kristie Ellis Said at [facility] with Red Flag Complaint. Brief description of triage: pt calls in reporting left side sinus pain including earache and toothache, mild swelling of left cheek. See below assessment. Triage indicates for patient to be seen within 4 hours. Pt advised if unable to be scheduled should proceed to clinic or C and pt agreeable with plan. Care advice provided, patient verbalizes understanding; denies any other questions or concerns; instructed to call back for any new or worsening symptoms. Writer provided warm transfer to [facility] for appointment scheduling. Attention Provider: Thank you for allowing me to participate in the care of your patient. The patient was connected to triage in response to information provided to the [facility]. Please do not respond through this encounter as the response is not directed to a shared pool. Reason for Disposition   [1] Redness or swelling on the cheek, forehead or around the eye AND [2] no fever    Answer Assessment - Initial Assessment Questions  1. LOCATION: \"Where does it hurt? \"       Left side, lower jaw is swollen. Pt was at the dentist at the end of September and told this ache is not related to dental issues. Pain also on forehead and under eye on left side. 2. ONSET: \"When did the sinus pain start? \"  (e.g., hours, days)       Resolved when she had this a few weeks ago and now it reoccurred yesterday    3. SEVERITY: \"How bad is the pain? \"   (Scale 1-10; mild, moderate or severe)    - MILD (1-3): doesn't interfere with normal activities     - MODERATE (4-7): interferes with normal activities (e.g., work or school) or awakens from sleep    - SEVERE (8-10): excruciating pain and patient unable to do any normal activities         Moderate, does not feel like doing a lot of activity    4. RECURRENT SYMPTOM: \"Have you ever had sinus problems before? \" If so, ask: \"When was the last time? \" and \"What happened that time? \"       Yes, this is a recurrent issue. 5. NASAL CONGESTION: \"Is the nose blocked? \" If so, ask, \"Can you open it or must you breathe through the mouth? \"      Mild, able to breathe through her nose    6. NASAL DISCHARGE: \"Do you have discharge from your nose? \" If so ask, \"What color? \"      Denies    7. FEVER: \"Do you have a fever? \" If so, ask: \"What is it, how was it measured, and when did it start? \"       Denies, most recent this morning 98.8 (temporal)    8. OTHER SYMPTOMS: \"Do you have any other symptoms? \" (e.g., sore throat, cough, earache, difficulty breathing)      Mild sore throat and earache on left, denies cough, denies difficulty breathing    Claritin, singular, saline nasal spray current treatment, tylenol q 6 hours. 9. PREGNANCY: \"Is there any chance you are pregnant? \" \"When was your last menstrual period? \"      N/A    Protocols used: SINUS PAIN OR CONGESTION-ADULT-

## 2021-10-06 NOTE — TELEPHONE ENCOUNTER
Received an APPROVAL for Trulicity 9.1EY/6.3ZC pen-injectors 9/5/2021 - 10/5/2022. Letter attached. Please notify patient, thank you.

## 2021-10-21 ENCOUNTER — TELEPHONE (OUTPATIENT)
Dept: PAIN MANAGEMENT | Age: 61
End: 2021-10-21

## 2021-10-21 NOTE — TELEPHONE ENCOUNTER
Submitted PA for FLUTICASONE  Via [facility] Key: [id number] STATUS: \"No Authorization Required. No authorization is required for the medication requested. \"    If this requires a response please respond to the pool ( [facility] [address]). Thank you please advise patient.

## 2021-10-25 ENCOUNTER — TELEPHONE (OUTPATIENT)
Dept: INTERNAL MEDICINE CLINIC | Age: 61
End: 2021-10-25

## 2021-10-25 RX ORDER — CLINDAMYCIN HYDROCHLORIDE 300 MG/1
300 CAPSULE ORAL 3 TIMES DAILY
Qty: 30 CAPSULE | Refills: 0 | Status: SHIPPED | OUTPATIENT
Start: 2021-10-25 | End: 2022-03-29

## 2021-10-25 NOTE — TELEPHONE ENCOUNTER
----- Message from Lionel Love sent at 10/25/2021  8:51 AM EDT -----  Subject: Message to Provider    QUESTIONS  Information for Provider? pt says that she had a sinus infection a few   weeks ago and it has come back and would like to know if she can get some   type of antibiotic for it. Patient says that she is allergic to   amoxicillin and penicillin and she would like the anti biotic that work   with in like a week.   ---------------------------------------------------------------------------  --------------  [address]  What is the best way for the office to contact you? OK to leave message on   voicemail  Preferred Call Back Phone Number? [phone]  ---------------------------------------------------------------------------  --------------  SCRIPT ANSWERS  Relationship to Patient?  Self

## 2021-11-01 RX ORDER — VALSARTAN 320 MG/1
TABLET ORAL
Qty: 30 TABLET | Refills: 5 | Status: SHIPPED | OUTPATIENT
Start: 2021-11-01 | End: 2021-11-09 | Stop reason: SDUPTHER

## 2021-11-09 ENCOUNTER — TELEPHONE (OUTPATIENT)
Dept: INTERNAL MEDICINE CLINIC | Age: 61
End: 2021-11-09

## 2021-11-09 DIAGNOSIS — I10 ESSENTIAL HYPERTENSION: Primary | ICD-10-CM

## 2021-11-09 RX ORDER — VALSARTAN 320 MG/1
320 TABLET ORAL DAILY
Qty: 30 TABLET | Refills: 5 | Status: SHIPPED | OUTPATIENT
Start: 2021-11-09 | End: 2022-02-28

## 2021-11-09 NOTE — TELEPHONE ENCOUNTER
I am sorry, should be 320 mg 1 po qd. I have updated her script but may need to call pharmacy as well.

## 2021-11-09 NOTE — TELEPHONE ENCOUNTER
Need clarification on valsartan, patient state that she is only taking 1 tablet daily but the rx that was sent on 11/1 to [facility] was sent in as 1 tablet twice daily. Please call sheryloger to clarify.      Allyson Mathias on [address] [phone]

## 2021-11-18 ENCOUNTER — TELEPHONE (OUTPATIENT)
Dept: INTERNAL MEDICINE CLINIC | Age: 61
End: 2021-11-18

## 2021-11-18 DIAGNOSIS — E11.9 TYPE 2 DIABETES MELLITUS WITHOUT COMPLICATION, WITHOUT LONG-TERM CURRENT USE OF INSULIN (HCC): ICD-10-CM

## 2021-11-18 RX ORDER — DULAGLUTIDE 1.5 MG/.5ML
1.5 INJECTION, SOLUTION SUBCUTANEOUS WEEKLY
Qty: 4 PEN | Refills: 1 | Status: SHIPPED | OUTPATIENT
Start: 2021-11-18 | End: 2022-04-26

## 2021-11-18 NOTE — TELEPHONE ENCOUNTER
----- Message from [place] sent at 11/16/2021 11:45 AM EST -----  Subject: Refill Request    QUESTIONS  Name of Medication? Dulaglutide (TRULICITY) 1.5 IQ/2.0PA SOPN  Patient-reported dosage and instructions? 1.5mg/0.5ml sopn  How many days do you have left? 0  Preferred Pharmacy? [address]  Pharmacy phone number (if available)? [phone]  Additional Information for Provider? Pt is requesting that the office   calls the pharmacy for this refill. Pt is needing a new script sent over. Pt cant get ahold of the pharmacy. Please call pt back if there is any   questions.   ---------------------------------------------------------------------------  --------------  CALL BACK INFO  What is the best way for the office to contact you? OK to leave message on   voicemail  Preferred Call Back Phone Number?  [phone]

## 2021-11-29 DIAGNOSIS — R09.81 NASAL CONGESTION: ICD-10-CM

## 2021-11-29 DIAGNOSIS — J30.89 SEASONAL ALLERGIC RHINITIS DUE TO OTHER ALLERGIC TRIGGER: ICD-10-CM

## 2021-11-29 DIAGNOSIS — R05.9 COUGH: ICD-10-CM

## 2021-11-29 DIAGNOSIS — R06.7 SNEEZING: ICD-10-CM

## 2021-11-29 RX ORDER — LORATADINE 10 MG/1
TABLET ORAL
Qty: 30 TABLET | Refills: 3 | Status: SHIPPED | OUTPATIENT
Start: 2021-11-29 | End: 2022-03-29

## 2022-02-07 RX ORDER — PEN NEEDLE, DIABETIC 31 G X1/4"
NEEDLE, DISPOSABLE MISCELLANEOUS
Qty: 100 EACH | Refills: 3 | Status: SHIPPED | OUTPATIENT
Start: 2022-02-07 | End: 2022-09-27

## 2022-02-28 DIAGNOSIS — I10 ESSENTIAL HYPERTENSION: ICD-10-CM

## 2022-02-28 RX ORDER — VALSARTAN 320 MG/1
TABLET ORAL
Qty: 17 TABLET | Refills: 3 | Status: SHIPPED | OUTPATIENT
Start: 2022-02-28

## 2022-03-02 DIAGNOSIS — R09.81 NASAL CONGESTION: ICD-10-CM

## 2022-03-02 DIAGNOSIS — R06.7 SNEEZING: ICD-10-CM

## 2022-03-02 DIAGNOSIS — J30.89 SEASONAL ALLERGIC RHINITIS DUE TO OTHER ALLERGIC TRIGGER: ICD-10-CM

## 2022-03-02 DIAGNOSIS — R05.9 COUGH: ICD-10-CM

## 2022-03-02 RX ORDER — FLUTICASONE PROPIONATE 50 MCG
SPRAY, SUSPENSION (ML) NASAL
Qty: 3 EACH | Refills: 3 | Status: SHIPPED | OUTPATIENT
Start: 2022-03-02

## 2022-03-04 ENCOUNTER — OFFICE VISIT (OUTPATIENT)
Dept: INTERNAL MEDICINE CLINIC | Age: 62
End: 2022-03-04
Payer: COMMERCIAL

## 2022-03-04 VITALS
WEIGHT: 149.6 LBS | BODY MASS INDEX: 27.36 KG/M2 | SYSTOLIC BLOOD PRESSURE: 130 MMHG | DIASTOLIC BLOOD PRESSURE: 74 MMHG | HEART RATE: 88 BPM | OXYGEN SATURATION: 97 %

## 2022-03-04 DIAGNOSIS — Z85.3 HISTORY OF BREAST CANCER: ICD-10-CM

## 2022-03-04 DIAGNOSIS — I10 ESSENTIAL HYPERTENSION: ICD-10-CM

## 2022-03-04 DIAGNOSIS — E78.5 HYPERLIPIDEMIA LDL GOAL <70: Primary | ICD-10-CM

## 2022-03-04 DIAGNOSIS — E11.9 TYPE 2 DIABETES MELLITUS WITHOUT COMPLICATION, WITHOUT LONG-TERM CURRENT USE OF INSULIN (HCC): ICD-10-CM

## 2022-03-04 DIAGNOSIS — H91.93 BILATERAL HEARING LOSS, UNSPECIFIED HEARING LOSS TYPE: ICD-10-CM

## 2022-03-04 PROBLEM — D05.12 DUCTAL CARCINOMA IN SITU (DCIS) OF LEFT BREAST: Status: RESOLVED | Noted: 2018-03-05 | Resolved: 2022-03-04

## 2022-03-04 LAB
HBA1C MFR BLD: 7.6 %
HBA1C MFR BLD: 7.6 %

## 2022-03-04 PROCEDURE — 99214 OFFICE O/P EST MOD 30 MIN: CPT | Performed by: INTERNAL MEDICINE

## 2022-03-04 PROCEDURE — 2022F DILAT RTA XM EVC RTNOPTHY: CPT | Performed by: INTERNAL MEDICINE

## 2022-03-04 PROCEDURE — 3017F COLORECTAL CA SCREEN DOC REV: CPT | Performed by: INTERNAL MEDICINE

## 2022-03-04 PROCEDURE — G8419 CALC BMI OUT NRM PARAM NOF/U: HCPCS | Performed by: INTERNAL MEDICINE

## 2022-03-04 PROCEDURE — 83036 HEMOGLOBIN GLYCOSYLATED A1C: CPT | Performed by: INTERNAL MEDICINE

## 2022-03-04 PROCEDURE — G8482 FLU IMMUNIZE ORDER/ADMIN: HCPCS | Performed by: INTERNAL MEDICINE

## 2022-03-04 PROCEDURE — G8427 DOCREV CUR MEDS BY ELIG CLIN: HCPCS | Performed by: INTERNAL MEDICINE

## 2022-03-04 PROCEDURE — 3051F HG A1C>EQUAL 7.0%<8.0%: CPT | Performed by: INTERNAL MEDICINE

## 2022-03-04 PROCEDURE — 1036F TOBACCO NON-USER: CPT | Performed by: INTERNAL MEDICINE

## 2022-03-04 RX ORDER — CLINDAMYCIN HYDROCHLORIDE 300 MG/1
CAPSULE ORAL
Qty: 30 CAPSULE | Refills: 0 | OUTPATIENT
Start: 2022-03-04

## 2022-03-04 NOTE — ASSESSMENT & PLAN NOTE
Continues to follow with Dr. Saniya Harris for breast exams and mammograms and oncology twice yearly. She previously saw Dr. Miguel A Kemp and will be establishing with a new oncologist since Dr. Josephine Martines snf.

## 2022-03-04 NOTE — PROGRESS NOTES
Patient: Carlos Winchester is a 57 y.o. female who presents today with the following Chief Complaint(s):  No chief complaint on file. HPI     Here today for follow up. Last seen in July 2021 for follow up. DM- worried about her sugars as she has been under stress and is therefore not eating as well as she should. No difficulties getting her medications covered. No low sugars. POCT 7.6%    HTN- no BP issues. HLD- on Crestor, due for labs. Anxious today but is \"ok in general\". Still following with oncology twice yearly. Is to establish with new oncologist d/t Dr. David carl. Continues to follow with Dr. Candie Renee for breast exams and mammograms. Next appointment is June 20,2022. Would like hearing evaluation. Has noticed hearing loss. Would like hearing aids if appropriate. Wt Readings from Last 3 Encounters:   03/04/22 149 lb 9.6 oz (67.9 kg)   10/06/21 145 lb 3.2 oz (65.9 kg)   07/12/21 142 lb 9.6 oz (64.7 kg)     Temp Readings from Last 3 Encounters:   10/06/21 97.7 °F (36.5 °C) (Temporal)   06/14/21 97.9 °F (36.6 °C) (Skin)   06/09/21 97.4 °F (36.3 °C)     BP Readings from Last 3 Encounters:   03/04/22 130/74   10/06/21 138/64   07/12/21 130/74     Pulse Readings from Last 3 Encounters:   03/04/22 88   10/06/21 96   07/12/21 104         Allergies   Allergen Reactions    Cefdinir Other (See Comments)     *Cephalosporins*: Pruritus.  Erythromycin Swelling     Throat swelling.  Ezetimibe-Simvastatin Other (See Comments)     Muscle cramps.  Lisinopril Other (See Comments)     Cough.  Niacin And Related Other (See Comments)     Niacin (antihyperlipidemic) *Antihyperlipidmics*: S..   [facility] Other Other (See Comments)     darvocet    Pcn [Penicillins] Hives    Zithromax [Azithromycin] Swelling     Throat swelling.        Past Medical History:   Diagnosis Date    Allergic rhinitis, cause unspecified     Cancer ([address]     left breast    Generalized osteoarthrosis, involving multiple sites     Pure hypercholesterolemia     Pure hyperglyceridemia     Type II or unspecified type diabetes mellitus without mention of complication, uncontrolled     Unspecified essential hypertension       Past Surgical History:   Procedure Laterality Date    CHOLECYSTECTOMY, LAPAROSCOPIC N/A 12/19/2018    LAPAROSCOPIC CHOLECYSTECTOMY; CHOLANGIOGRAM performed by Meenakshi Monterroso MD at [facility]      colonoscopy ordered 6/2010; 5/2011; 11/2011, recommended again and refused at this time on 3/30/12 & 4/2012     COLONOSCOPY N/A 11/29/2018    COLONOSCOPY POLYPECTOMY SNARE/COLD BIOPSY performed by Randall Claros MD at [facility] Left 04/18/2019    Skin sparing mastectomy Sentinal Lymph node Bx Tissue expander    NASAL SEPTUM SURGERY      TONSILLECTOMY      WRIST SURGERY Right       Social History     Socioeconomic History    Marital status: Single     Spouse name: Not on file    Number of children: Not on file    Years of education: Not on file    Highest education level: Not on file   Occupational History    Not on file   Tobacco Use    Smoking status: Never Smoker    Smokeless tobacco: Never Used   Vaping Use    Vaping Use: Never used   Substance and Sexual Activity    Alcohol use: No     Alcohol/week: 0.0 standard drinks    Drug use: No    Sexual activity: Not on file   Other Topics Concern    Not on file   Social History Narrative    Not on file     Social Determinants of Health     Financial Resource Strain: Low Risk     Difficulty of Paying Living Expenses: Not hard at all   Food Insecurity: No Food Insecurity    Worried About Running Out of Food in the Last Year: Never true    Martin of Food in the Last Year: Never true   Transportation Needs:     Lack of Transportation (Medical): Not on file    Lack of Transportation (Non-Medical):  Not on file   Physical Activity:     Days of Exercise per Week: Not on file  Minutes of Exercise per Session: Not on file   Stress:     Feeling of Stress : Not on file   Social Connections:     Frequency of Communication with Friends and Family: Not on file    Frequency of Social Gatherings with Friends and Family: Not on file    Attends Quaker Services: Not on file    Active Member of Clubs or Organizations: Not on file    Attends Club or Organization Meetings: Not on file    Marital Status: Not on file   Intimate Partner Violence:     Fear of Current or Ex-Partner: Not on file    Emotionally Abused: Not on file    Physically Abused: Not on file    Sexually Abused: Not on file   Housing Stability:     Unable to Pay for Housing in the Last Year: Not on file    Number of Places Lived in the Last Year: Not on file    Unstable Housing in the Last Year: Not on file     Family History   Problem Relation Age of Onset    Asthma Maternal Grandmother     Hypertension Maternal Grandmother     Breast Cancer Maternal Grandmother 76    Heart Disease Maternal Grandfather     Hypertension Paternal Grandmother     Heart Disease Paternal Grandfather     Hypertension Paternal Grandfather     COPD Father         Outpatient Medications Prior to Visit   Medication Sig Dispense Refill    fluticasone (FLONASE) 50 MCG/ACT nasal spray SPRAY TWO SPRAYS IN EACH NOSTRIL ONCE DAILY 3 each 3    metFORMIN (GLUCOPHAGE) 850 MG tablet TAKE ONE TABLET BY MOUTH TWICE A DAY WITH A MEAL 180 tablet 3    valsartan (DIOVAN) 320 MG tablet TAKE ONE TABLET BY MOUTH DAILY 17 tablet 3    KROGER PEN NEEDLES 31G X 6 MM MISC USE ONE - DAILY 100 each 3    rosuvastatin (CRESTOR) 10 MG tablet TAKE ONE TABLET BY MOUTH DAILY 90 tablet 1    loratadine (CLARITIN) 10 MG tablet TAKE ONE TABLET BY MOUTH DAILY 30 tablet 3    Dulaglutide (TRULICITY) 1.5 ZE/6.4QN SOPN Inject 1.5 mg into the skin once a week 4 pen 1    sodium chloride (OCEAN, BABY AYR) 0.65 % nasal spray 1 spray by Nasal route as needed for Congestion      empagliflozin (JARDIANCE) 25 MG tablet Take 25 mg by mouth daily 30 tablet 5    insulin glargine (LANTUS SOLOSTAR) 100 UNIT/ML injection pen Inject 60 Units into the skin nightly 10 pen 5    omeprazole (PRILOSEC) 40 MG delayed release capsule Take 1 capsule by mouth daily 30 capsule 5    montelukast (SINGULAIR) 10 MG tablet Take 1 tablet by mouth nightly as needed (allergies) 30 tablet 5    glipiZIDE (GLUCOTROL) 5 MG tablet Take 1 tablet by mouth 2 times daily (before meals) 60 tablet 5    blood glucose monitor kit and supplies Test BID times a day & as needed for symptoms of irregular blood glucose. 1 kit 0    blood glucose monitor strips Test BID  times a day & as needed for symptoms of irregular blood glucose. 100 strip 5    Lancets MISC 1 each by Does not apply route 2 times daily 100 each 5    vitamin C (ASCORBIC ACID) 500 MG tablet Take 500 mg by mouth daily      CALCIUM PO Take by mouth 2 times daily      Denosumab (PROLIA SC) Inject into the skin every 6 months      anastrozole (ARIMIDEX) 1 MG tablet Take 1 mg by mouth nightly      Simethicone 180 MG CAPS Take by mouth 2 times daily as needed      Cholecalciferol (VITAMIN D3) 2000 units CAPS Take by mouth daily       Probiotic Product (PROBIOTIC DAILY PO) Take by mouth daily       Lancet Devices (SOFT TOUCH LANCET DEVICE) MISC by Does not apply route daily. No facility-administered medications prior to visit. Patient'spast medical history, surgical history, family history, medications,  and allergies  were all reviewed and updated as appropriate today. Review of Systems   Constitutional: Negative for appetite change, fatigue, fever and unexpected weight change. Eyes: Negative for visual disturbance. Respiratory: Negative for chest tightness and shortness of breath. Cardiovascular: Negative for chest pain. Gastrointestinal: Negative for abdominal pain, constipation and diarrhea.    Endocrine: Negative for cold intolerance, heat intolerance, polydipsia, polyphagia and polyuria. No low blood sugars   Skin: Negative for rash. /74   Pulse 88   Wt 149 lb 9.6 oz (67.9 kg)   SpO2 97%   BMI 27.36 kg/m²   Physical Exam  Vitals and nursing note reviewed. Constitutional:       Appearance: She is well-developed. She is not toxic-appearing. HENT:      Head: Normocephalic. Right Ear: Tympanic membrane, ear canal and external ear normal.      Left Ear: Tympanic membrane, ear canal and external ear normal.      Mouth/Throat:      Pharynx: No oropharyngeal exudate or posterior oropharyngeal erythema. Eyes:      General: No scleral icterus. Extraocular Movements: Extraocular movements intact. Conjunctiva/sclera: Conjunctivae normal.      Pupils: Pupils are equal, round, and reactive to light. Neck:      Thyroid: No thyroid mass or thyromegaly. Vascular: No carotid bruit. Cardiovascular:      Rate and Rhythm: Normal rate and regular rhythm. Pulses:           Dorsalis pedis pulses are 2+ on the right side and 2+ on the left side. Posterior tibial pulses are 2+ on the right side and 2+ on the left side. Heart sounds: Normal heart sounds. No murmur heard. Pulmonary:      Effort: Pulmonary effort is normal.      Breath sounds: Normal breath sounds. Musculoskeletal:      Right lower leg: No edema. Left lower leg: No edema. Right foot: Normal range of motion. No deformity, bunion, Charcot foot, foot drop or prominent metatarsal heads. Left foot: Normal range of motion. No deformity, bunion, Charcot foot, foot drop or prominent metatarsal heads. Feet:      Right foot:      Protective Sensation: 10 sites tested. 10 sites sensed. Skin integrity: Skin integrity normal.      Toenail Condition: Right toenails are normal.      Left foot:      Protective Sensation: 10 sites tested. 10 sites sensed.       Skin integrity: Skin integrity normal. Toenail Condition: Left toenails are normal.   Lymphadenopathy:      Cervical: No cervical adenopathy. Neurological:      General: No focal deficit present. Mental Status: She is alert and oriented to person, place, and time. Psychiatric:         Mood and Affect: Mood normal.         Behavior: Behavior normal. Behavior is cooperative. ASSESSMENT/PLAN:    Problem List Items Addressed This Visit     Bilateral hearing loss     Refer to audiology for hearing evaluation. Relevant Orders    Otto Luis, Audiology, [facility]    Essential hypertension      Well-controlled on valsartan 320 mg daily. Continue. History of breast cancer     Continues to follow with Dr. Lalitha Westbrook for breast exams and mammograms and oncology twice yearly. She previously saw Dr. Sarina Rodriguez and will be establishing with a new oncologist since Dr. Danny Young half-way. Hyperlipidemia LDL goal <70 - Primary      Continue Crestor 10 mg daily. Check lipid panel, CMP. Relevant Orders    Lipid Panel    Comprehensive Metabolic Panel    TSH with Reflex    Type 2 diabetes mellitus without complication, without long-term current use of insulin ([facility])     POCT hemoglobin A1c is 7.6%. Continue Trulicity 1.5 mg weekly, Jardiance 25 mg daily, Metformin 850 mg twice daily, glipizide 5 mg twice daily and Lantus 60 units nightly. Patient is going to try to do better with her diet over the next 3 months. May need to increase glipizide versus add Actos.          Relevant Orders    POCT glycosylated hemoglobin (Hb A1C) (Completed)    Microalbumin / Creatinine Urine Ratio    Lipid Panel    Comprehensive Metabolic Panel    CBC with Auto Differential    Hemoglobin A1C    Comprehensive Metabolic Panel     DIABETES FOOT EXAM (Completed)    POCT glycosylated hemoglobin (Hb A1C) (Completed)          Current Outpatient Medications   Medication Sig Dispense Refill    fluticasone (FLONASE) 50 MCG/ACT nasal spray SPRAY TWO SPRAYS IN EACH NOSTRIL ONCE DAILY 3 each 3    metFORMIN (GLUCOPHAGE) 850 MG tablet TAKE ONE TABLET BY MOUTH TWICE A DAY WITH A MEAL 180 tablet 3    valsartan (DIOVAN) 320 MG tablet TAKE ONE TABLET BY MOUTH DAILY 17 tablet 3    KROGER PEN NEEDLES 31G X 6 MM MISC USE ONE - DAILY 100 each 3    rosuvastatin (CRESTOR) 10 MG tablet TAKE ONE TABLET BY MOUTH DAILY 90 tablet 1    loratadine (CLARITIN) 10 MG tablet TAKE ONE TABLET BY MOUTH DAILY 30 tablet 3    Dulaglutide (TRULICITY) 1.5 TK/2.3EV SOPN Inject 1.5 mg into the skin once a week 4 pen 1    sodium chloride (OCEAN, BABY AYR) 0.65 % nasal spray 1 spray by Nasal route as needed for Congestion      empagliflozin (JARDIANCE) 25 MG tablet Take 25 mg by mouth daily 30 tablet 5    insulin glargine (LANTUS SOLOSTAR) 100 UNIT/ML injection pen Inject 60 Units into the skin nightly 10 pen 5    omeprazole (PRILOSEC) 40 MG delayed release capsule Take 1 capsule by mouth daily 30 capsule 5    montelukast (SINGULAIR) 10 MG tablet Take 1 tablet by mouth nightly as needed (allergies) 30 tablet 5    glipiZIDE (GLUCOTROL) 5 MG tablet Take 1 tablet by mouth 2 times daily (before meals) 60 tablet 5    blood glucose monitor kit and supplies Test BID times a day & as needed for symptoms of irregular blood glucose. 1 kit 0    blood glucose monitor strips Test BID  times a day & as needed for symptoms of irregular blood glucose.  100 strip 5    Lancets MISC 1 each by Does not apply route 2 times daily 100 each 5    vitamin C (ASCORBIC ACID) 500 MG tablet Take 500 mg by mouth daily      CALCIUM PO Take by mouth 2 times daily      Denosumab (PROLIA SC) Inject into the skin every 6 months      anastrozole (ARIMIDEX) 1 MG tablet Take 1 mg by mouth nightly      Simethicone 180 MG CAPS Take by mouth 2 times daily as needed      Cholecalciferol (VITAMIN D3) 2000 units CAPS Take by mouth daily       Probiotic Product (PROBIOTIC DAILY PO) Take by mouth daily       Lancet Devices (SOFT TOUCH LANCET DEVICE) MISC by Does not apply route daily. No current facility-administered medications for this visit. Return in about 3 months (around 6/4/2022) for labs prior.

## 2022-03-04 NOTE — PATIENT INSTRUCTIONS
Please have your labs drawn about 1 week prior to your next appointment next week and before your June appointment . You may get your labs drawn in our office, Monday-Friday from 7:30 am-4:00 pm. You do not need an appointment. If this does not work for you, you may also have your labs drawn at [facility] earlier during the week or on weekends. If you prefer to have your labs draw elsewhere ([facility]), please allow a little more time for me to get your results and please call the office ahead of your appointment so that we may make sure that we have your labs at the time of your appointment. We sometimes need to call the lab directly (when not done at a  [facility] lab) to get your results. Your labs are fasting.    fasting (nothing to eat w/in 8 hours of the lab test, is ok to drink water or black coffee)

## 2022-03-06 PROBLEM — R14.0 ABDOMINAL BLOATING: Status: RESOLVED | Noted: 2019-10-28 | Resolved: 2022-03-06
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
## 2022-03-06 PROBLEM — R06.7 SNEEZING: Status: RESOLVED | Noted: 2021-07-17 | Resolved: 2022-03-06

## 2022-03-06 PROBLEM — H91.93 BILATERAL HEARING LOSS: Status: ACTIVE | Noted: 2022-03-06

## 2022-03-06 PROBLEM — R09.81 NASAL CONGESTION: Status: RESOLVED | Noted: 2021-07-17 | Resolved: 2022-03-06

## 2022-03-06 ASSESSMENT — ENCOUNTER SYMPTOMS
SHORTNESS OF BREATH: 0
CONSTIPATION: 0
ABDOMINAL PAIN: 0
DIARRHEA: 0
CHEST TIGHTNESS: 0

## 2022-03-06 NOTE — ASSESSMENT & PLAN NOTE
POCT hemoglobin A1c is 7.6%. Continue Trulicity 1.5 mg weekly, Jardiance 25 mg daily, Metformin 850 mg twice daily, glipizide 5 mg twice daily and Lantus 60 units nightly. Patient is going to try to do better with her diet over the next 3 months. May need to increase glipizide versus add Actos.

## 2022-03-07 DIAGNOSIS — E11.9 TYPE 2 DIABETES MELLITUS WITHOUT COMPLICATION, WITHOUT LONG-TERM CURRENT USE OF INSULIN (HCC): ICD-10-CM

## 2022-03-07 DIAGNOSIS — E78.5 HYPERLIPIDEMIA LDL GOAL <70: ICD-10-CM

## 2022-03-07 LAB
A/G RATIO: 1.8 (ref 1.1–2.2)
ALBUMIN SERPL-MCNC: 4.4 G/DL (ref 3.4–5)
ALP BLD-CCNC: 82 U/L (ref 40–129)
ALT SERPL-CCNC: 17 U/L (ref 10–40)
ANION GAP SERPL CALCULATED.3IONS-SCNC: 15 MMOL/L (ref 3–16)
AST SERPL-CCNC: 24 U/L (ref 15–37)
BASOPHILS ABSOLUTE: 0 K/UL (ref 0–0.2)
BASOPHILS RELATIVE PERCENT: 0.4 %
BILIRUB SERPL-MCNC: 0.8 MG/DL (ref 0–1)
BUN BLDV-MCNC: 10 MG/DL (ref 7–20)
CALCIUM SERPL-MCNC: 9.4 MG/DL (ref 8.3–10.6)
CHLORIDE BLD-SCNC: 103 MMOL/L (ref 99–110)
CHOLESTEROL, TOTAL: 117 MG/DL (ref 0–199)
CO2: 22 MMOL/L (ref 21–32)
CREAT SERPL-MCNC: 0.7 MG/DL (ref 0.6–1.2)
CREATININE URINE: 38.3 MG/DL (ref 28–259)
EOSINOPHILS ABSOLUTE: 0.2 K/UL (ref 0–0.6)
EOSINOPHILS RELATIVE PERCENT: 3.5 %
GFR AFRICAN AMERICAN: >60
GFR NON-AFRICAN AMERICAN: >60
GLUCOSE BLD-MCNC: 105 MG/DL (ref 70–99)
HCT VFR BLD CALC: 39.2 % (ref 36–48)
HDLC SERPL-MCNC: 32 MG/DL (ref 40–60)
HEMOGLOBIN: 13.3 G/DL (ref 12–16)
LDL CHOLESTEROL CALCULATED: 35 MG/DL
LYMPHOCYTES ABSOLUTE: 1.6 K/UL (ref 1–5.1)
LYMPHOCYTES RELATIVE PERCENT: 24.9 %
MCH RBC QN AUTO: 30.5 PG (ref 26–34)
MCHC RBC AUTO-ENTMCNC: 33.9 G/DL (ref 31–36)
MCV RBC AUTO: 90.1 FL (ref 80–100)
MICROALBUMIN UR-MCNC: <1.2 MG/DL
MICROALBUMIN/CREAT UR-RTO: NORMAL MG/G (ref 0–30)
MONOCYTES ABSOLUTE: 0.5 K/UL (ref 0–1.3)
MONOCYTES RELATIVE PERCENT: 7.9 %
NEUTROPHILS ABSOLUTE: 4 K/UL (ref 1.7–7.7)
NEUTROPHILS RELATIVE PERCENT: 63.3 %
PDW BLD-RTO: 13.5 % (ref 12.4–15.4)
PLATELET # BLD: 253 K/UL (ref 135–450)
PMV BLD AUTO: 9.4 FL (ref 5–10.5)
POTASSIUM SERPL-SCNC: 4.3 MMOL/L (ref 3.5–5.1)
RBC # BLD: 4.35 M/UL (ref 4–5.2)
SODIUM BLD-SCNC: 140 MMOL/L (ref 136–145)
TOTAL PROTEIN: 6.9 G/DL (ref 6.4–8.2)
TRIGL SERPL-MCNC: 252 MG/DL (ref 0–150)
TSH REFLEX: 2.52 UIU/ML (ref 0.27–4.2)
VLDLC SERPL CALC-MCNC: 50 MG/DL
WBC # BLD: 6.4 K/UL (ref 4–11)

## 2022-03-08 RX ORDER — ROSUVASTATIN CALCIUM 10 MG/1
TABLET, COATED ORAL
Qty: 90 TABLET | Refills: 1 | OUTPATIENT
Start: 2022-03-08

## 2022-03-11 ENCOUNTER — PROCEDURE VISIT (OUTPATIENT)
Dept: AUDIOLOGY | Age: 62
End: 2022-03-11
Payer: COMMERCIAL

## 2022-03-11 DIAGNOSIS — H90.A31 MIXED CONDUCTIVE AND SENSORINEURAL HEARING LOSS OF RIGHT EAR WITH RESTRICTED HEARING OF LEFT EAR: Primary | ICD-10-CM

## 2022-03-11 DIAGNOSIS — H74.8X1 TYPE A-S TYMPANOGRAM, RIGHT: ICD-10-CM

## 2022-03-11 PROCEDURE — 92567 TYMPANOMETRY: CPT | Performed by: AUDIOLOGIST

## 2022-03-11 PROCEDURE — 92557 COMPREHENSIVE HEARING TEST: CPT | Performed by: AUDIOLOGIST

## 2022-03-11 NOTE — Clinical Note
Thank you for the referral. Patient will proceed with ENT visit to obtain medical clearance for amplification.

## 2022-03-11 NOTE — PROGRESS NOTES
[address] [facility] Audiology/Otolaryngology    3/11/2022     Patient name: [facility]  Primary Care Physician: Suzi Gonzalez DO   Medical Record Number: [id number]     [facility]   1960, 57 y.o. female   [id number]       Referring Provider: Suzi Gonzalez DO   Referral Type: In an order in [address] [place] [address]    Reason for Visit: Evaluation of suspected change in hearing, tinnitus, or balance. AUDIOLOGIC AND OTHER PERTINENT MEDICAL HISTORY:      Chance Chopra was seen for comprehensive audiologic evaluation. Patient presents with diminished hearing. Notes tinnitus ,sounds like humming/buzzing, more pronounced in right ear. Has noticed increased difficulty while watching television, and during communication with others. She is undergoing treatment for breast cancer, takes a pill daily. History of Type 2 diabetes. ASSESSMENT AND FINDINGS:     Otoscopy revealed: Visible tympanic membrane bilaterally    RIGHT EAR:  Hearing Sensitivity: Severe mixed hearing loss rising to moderate, 30 dB asymmetry at . 250 kHz. Word Recognition: Excellent (%), based on NU-6   Tympanometry: Normal peak pressure with low compliance, Type As tympanogram, consistent with reduced tympanic membrane mobility. Acoustic Reflexes: Ipsilateral: Present at elevated sensation levels. LEFT EAR:  Hearing Sensitivity: Moderate mixed hearing loss  Word Recognition: Excellent (%), based on NU-6   Tympanometry: Normal peak pressure and compliance, Type A tympanogram, consistent with normal middle ear function. Acoustic Reflexes: Ipsilateral: Present at normal sensation levels and Present at elevated sensation levels. Quick SIN (hearing performance in noise) assessment was administered. Mario Chopra scored  19/25.5, consistent with 6.5 dB signal-noise ratio loss. Suggestive of mild degree difficulty hearing during the presence of noise or complex listening enviornments. COUNSELING:        Reviewed purpose of testing completed today, general auditory system function, and results obtained today. Hearing in Noise Assessment reviewed. Patient counseled on impacts of hearing status with respect to background noise. Degree of   Hearing Sensitivity dB Range   Within Normal Limits (WNL) 0 - 20   Mild 20 - 40   Moderate 40 - 55   Moderately-Severe 55 - 70   Severe 70 - 90   Profound 90 +        RECOMMENDATIONS:      If you are interested in pursuing hearing aids, here are the next steps:     1. Contact your insurance and ask, Do I have a benefit for hearing aids?   ? If the answer is no hearing aids will be a 100% out of pocket expense  ? If the answer is yes, ask Can I use this benefit at [facility]  or Where can I use this benefit?  If [address] [place] [address] is not in-network for hearing aids, your insurance should be able to provide the appropriate contact information for an in-network provider. 1. Call [facility] ([phone]) to schedule a Hearing Aid Evaluation   ? This appointment is when we will discuss hearing aid options and decide which device is most appropriate for you. Proceed with ENT eval for right asymmetrical hearing loss. Amplification recommended. Patient will consider.     Electronically signed by Barbra Vogel on 3/11/22 at 3:19 PM.

## 2022-03-29 DIAGNOSIS — J30.89 SEASONAL ALLERGIC RHINITIS DUE TO OTHER ALLERGIC TRIGGER: ICD-10-CM

## 2022-03-29 DIAGNOSIS — R09.81 NASAL CONGESTION: ICD-10-CM

## 2022-03-29 DIAGNOSIS — R05.9 COUGH: ICD-10-CM

## 2022-03-29 DIAGNOSIS — R06.7 SNEEZING: ICD-10-CM

## 2022-03-29 DIAGNOSIS — E11.9 TYPE 2 DIABETES MELLITUS WITHOUT COMPLICATION, WITHOUT LONG-TERM CURRENT USE OF INSULIN (HCC): ICD-10-CM

## 2022-03-29 PROCEDURE — 3051F HG A1C>EQUAL 7.0%<8.0%: CPT | Performed by: INTERNAL MEDICINE

## 2022-03-29 RX ORDER — LORATADINE 10 MG/1
TABLET ORAL
Qty: 30 TABLET | Refills: 3 | Status: SHIPPED | OUTPATIENT
Start: 2022-03-29 | End: 2022-08-09

## 2022-03-29 RX ORDER — CLINDAMYCIN HYDROCHLORIDE 300 MG/1
CAPSULE ORAL
Qty: 30 CAPSULE | Refills: 0 | Status: SHIPPED | OUTPATIENT
Start: 2022-03-29 | End: 2022-06-06

## 2022-03-29 RX ORDER — MONTELUKAST SODIUM 10 MG/1
TABLET ORAL
Qty: 24 TABLET | Refills: 3 | Status: SHIPPED | OUTPATIENT
Start: 2022-03-29 | End: 2022-05-31

## 2022-03-29 RX ORDER — EMPAGLIFLOZIN 25 MG/1
TABLET, FILM COATED ORAL
Qty: 30 TABLET | Refills: 5 | Status: SHIPPED | OUTPATIENT
Start: 2022-03-29 | End: 2022-03-30

## 2022-03-29 RX ORDER — ROSUVASTATIN CALCIUM 10 MG/1
TABLET, COATED ORAL
Qty: 90 TABLET | Refills: 1 | Status: SHIPPED | OUTPATIENT
Start: 2022-03-29 | End: 2022-08-03

## 2022-03-30 DIAGNOSIS — E11.9 TYPE 2 DIABETES MELLITUS WITHOUT COMPLICATION, WITHOUT LONG-TERM CURRENT USE OF INSULIN (HCC): ICD-10-CM

## 2022-03-30 RX ORDER — EMPAGLIFLOZIN 25 MG/1
TABLET, FILM COATED ORAL
Qty: 30 TABLET | Refills: 5 | Status: SHIPPED | OUTPATIENT
Start: 2022-03-30

## 2022-03-31 ENCOUNTER — OFFICE VISIT (OUTPATIENT)
Dept: ENT CLINIC | Age: 62
End: 2022-03-31
Payer: COMMERCIAL

## 2022-03-31 VITALS — WEIGHT: 150 LBS | BODY MASS INDEX: 27.44 KG/M2 | SYSTOLIC BLOOD PRESSURE: 155 MMHG | DIASTOLIC BLOOD PRESSURE: 80 MMHG

## 2022-03-31 DIAGNOSIS — J30.9 ALLERGIC RHINITIS, UNSPECIFIED SEASONALITY, UNSPECIFIED TRIGGER: Primary | ICD-10-CM

## 2022-03-31 DIAGNOSIS — H90.6 MIXED CONDUCTIVE AND SENSORINEURAL HEARING LOSS OF BOTH EARS: ICD-10-CM

## 2022-03-31 DIAGNOSIS — J34.3 HYPERTROPHY OF BOTH INFERIOR NASAL TURBINATES: ICD-10-CM

## 2022-03-31 PROCEDURE — 3017F COLORECTAL CA SCREEN DOC REV: CPT | Performed by: STUDENT IN AN ORGANIZED HEALTH CARE EDUCATION/TRAINING PROGRAM

## 2022-03-31 PROCEDURE — G8419 CALC BMI OUT NRM PARAM NOF/U: HCPCS | Performed by: STUDENT IN AN ORGANIZED HEALTH CARE EDUCATION/TRAINING PROGRAM

## 2022-03-31 PROCEDURE — G8482 FLU IMMUNIZE ORDER/ADMIN: HCPCS | Performed by: STUDENT IN AN ORGANIZED HEALTH CARE EDUCATION/TRAINING PROGRAM

## 2022-03-31 PROCEDURE — 1036F TOBACCO NON-USER: CPT | Performed by: STUDENT IN AN ORGANIZED HEALTH CARE EDUCATION/TRAINING PROGRAM

## 2022-03-31 PROCEDURE — G8427 DOCREV CUR MEDS BY ELIG CLIN: HCPCS | Performed by: STUDENT IN AN ORGANIZED HEALTH CARE EDUCATION/TRAINING PROGRAM

## 2022-03-31 PROCEDURE — 99204 OFFICE O/P NEW MOD 45 MIN: CPT | Performed by: STUDENT IN AN ORGANIZED HEALTH CARE EDUCATION/TRAINING PROGRAM

## 2022-03-31 RX ORDER — AZELASTINE 1 MG/ML
1 SPRAY, METERED NASAL 2 TIMES DAILY
Qty: 60 ML | Refills: 1 | Status: SHIPPED | OUTPATIENT
Start: 2022-03-31 | End: 2022-08-08

## 2022-03-31 NOTE — PROGRESS NOTES
[address] [facility] NECK SURGERY  NEW PATIENT HISTORY AND PHYSICAL NOTE      Patient Name: Home Kumar Record Number:  [id number]  Primary Care Physician:  Dionna De Leon DO    ChiefComplaint     Chief Complaint   Patient presents with    Ear Problem     pt here today for NP Visit to discuss hearing loss and hearing a buzzing noise/. no pain in the ears        History of Present Illness     Lexii Mg is an 57 y.o. female presenting with hearing loss. She lives with her mother who she has difficulty understanding at times. This prompter her to get an audiological evaluation. Has been turning TV up at times. Denies recent acute changes in hearing. Occasional buzzing tinnitus usually in one ear at a time, does not last long. No otalgia. No otorrhea. No history of chronic ear infections. No history of otologic surgery. No family history of early onset hearing loss. No loud noise exposures. Currently being treated for breast cancer, has been taking Arimidex daily. Denies vertigo. Chronic allergy issues. Started on claritin and singulair last year. Uses flonase nightly. This seems to help. No recent sinus infection.      Past Medical History     Past Medical History:   Diagnosis Date    Allergic rhinitis, cause unspecified     Cancer ([address]     left breast    Generalized osteoarthrosis, involving multiple sites     Pure hypercholesterolemia     Pure hyperglyceridemia     Type II or unspecified type diabetes mellitus without mention of complication, uncontrolled     Unspecified essential hypertension        Past Surgical History     Past Surgical History:   Procedure Laterality Date    CHOLECYSTECTOMY, LAPAROSCOPIC N/A 12/19/2018    LAPAROSCOPIC CHOLECYSTECTOMY; CHOLANGIOGRAM performed by Mike Mackay MD at [facility]      colonoscopy ordered 6/2010; 5/2011; 11/2011, recommended again and refused at this time on 3/30/12 & 4/2012     COLONOSCOPY N/A 11/29/2018    COLONOSCOPY POLYPECTOMY SNARE/COLD BIOPSY performed by Warren Pastrana MD at [address] Left 04/18/2019    Skin sparing mastectomy Sentinal Lymph node Bx Tissue expander    NASAL SEPTUM SURGERY      TONSILLECTOMY      WRIST SURGERY Right        Family History     Family History   Problem Relation Age of Onset    Asthma Maternal Grandmother     Hypertension Maternal Grandmother     Breast Cancer Maternal Grandmother 76    Heart Disease Maternal Grandfather     Hypertension Paternal Grandmother     Heart Disease Paternal Grandfather     Hypertension Paternal Grandfather     COPD Father        Social History     Social History     Tobacco Use    Smoking status: Never Smoker    Smokeless tobacco: Never Used   Vaping Use    Vaping Use: Never used   Substance Use Topics    Alcohol use: No     Alcohol/week: 0.0 standard drinks    Drug use: No        Allergies     Allergies   Allergen Reactions    Cefdinir Other (See Comments)     *Cephalosporins*: Pruritus.  Erythromycin Swelling     Throat swelling.  Ezetimibe-Simvastatin Other (See Comments)     Muscle cramps.  Lisinopril Other (See Comments)     Cough.  Niacin And Related Other (See Comments)     Niacin (antihyperlipidemic) *Antihyperlipidmics*: S.E.   [place] Other Other (See Comments)     darvocet    Pcn [Penicillins] Hives    Zithromax [Azithromycin] Swelling     Throat swelling.         Medications     Current Outpatient Medications   Medication Sig Dispense Refill    azelastine (ASTELIN) 0.1 % nasal spray 1 spray by Nasal route 2 times daily Use in each nostril as directed 60 mL 1    JARDIANCE 25 MG tablet TAKE ONE TABLET BY MOUTH DAILY 30 tablet 5    clindamycin (CLEOCIN) 300 MG capsule TAKE ONE CAPSULE BY MOUTH THREE TIMES A DAY FOR 10 DAYS 30 capsule 0    rosuvastatin (CRESTOR) 10 MG tablet TAKE ONE TABLET BY MOUTH DAILY 90 tablet 1    loratadine (CLARITIN) 10 MG tablet TAKE ONE TABLET BY MOUTH DAILY 30 tablet 3    montelukast (SINGULAIR) 10 MG tablet TAKE ONE TABLET BY MOUTH EVERY NIGHT AS NEEDED FOR ALLERGIES 24 tablet 3    fluticasone (FLONASE) 50 MCG/ACT nasal spray SPRAY TWO SPRAYS IN EACH NOSTRIL ONCE DAILY 3 each 3    metFORMIN (GLUCOPHAGE) 850 MG tablet TAKE ONE TABLET BY MOUTH TWICE A DAY WITH A MEAL 180 tablet 3    valsartan (DIOVAN) 320 MG tablet TAKE ONE TABLET BY MOUTH DAILY 17 tablet 3    [employer] PEN NEEDLES 31G X 6 MM MISC USE ONE - DAILY 100 each 3    Dulaglutide (TRULICITY) 1.5 YB/0.4AB SOPN Inject 1.5 mg into the skin once a week 4 pen 1    sodium chloride (OCEAN, BABY AYR) 0.65 % nasal spray 1 spray by Nasal route as needed for Congestion      insulin glargine (LANTUS SOLOSTAR) 100 UNIT/ML injection pen Inject 60 Units into the skin nightly 10 pen 5    omeprazole (PRILOSEC) 40 MG delayed release capsule Take 1 capsule by mouth daily 30 capsule 5    glipiZIDE (GLUCOTROL) 5 MG tablet Take 1 tablet by mouth 2 times daily (before meals) 60 tablet 5    blood glucose monitor kit and supplies Test BID times a day & as needed for symptoms of irregular blood glucose. 1 kit 0    blood glucose monitor strips Test BID  times a day & as needed for symptoms of irregular blood glucose. 100 strip 5    Lancets MISC 1 each by Does not apply route 2 times daily 100 each 5    vitamin C (ASCORBIC ACID) 500 MG tablet Take 500 mg by mouth daily      CALCIUM PO Take by mouth 2 times daily      Denosumab (PROLIA SC) Inject into the skin every 6 months      anastrozole (ARIMIDEX) 1 MG tablet Take 1 mg by mouth nightly      Simethicone 180 MG CAPS Take by mouth 2 times daily as needed      Cholecalciferol (VITAMIN D3) 2000 units CAPS Take by mouth daily       Probiotic Product (PROBIOTIC DAILY PO) Take by mouth daily       Lancet Devices (SOFT TOUCH LANCET DEVICE) MISC by Does not apply route daily. No current facility-administered medications for this visit.        Review of Systems     REVIEW OF SYSTEMS  The following systems were reviewed and revealed the following in addition to any already discussed in the HPI:    CONSTITUTIONAL: no weight loss, no fever, no night sweats, no chills  EYES: no vision changes, no blurry vision  EARS: +hearing loss, no otalgia  NOSE: no epistaxis, no rhinorrhea  THROAT: No voice changes, no sore throat, no dysphagia      PhysicalExam     Vitals:    03/31/22 1009 03/31/22 1010   BP: (!) 155/80 (!) 155/80   Weight: 150 lb (68 kg)        PHYSICAL EXAM  BP (!) 155/80   Wt 150 lb (68 kg)   BMI 27.44 kg/m²     GENERAL: No acute distress, alert and oriented, no hoarseness  EYES: EOMI, Anti-icteric  NOSE: On anterior rhinoscopy there is no epistaxis, nasal mucosa moist and normal appearing, no purulent drainage. Bilateral inferior turbinate hypertrophy. EARS: Normal external appearance; on portable otomicroscopy:     -Ad: External auditory canal without stenosis, tympanic membrane clear, no middle ear effusions or retractions.      -As: External auditory canal without stenosis, tympanic membrane clear, no middle ear effusions or retractions. Pneumatic otoscopy: Bilateral tympanic membranes mobile pneumatic otoscopy  FACE: HB 1/6 bilaterally, symmetric appearing, sensation equal bilaterally  ORAL CAVITY: No masses or lesions visualized or palpated, uvula is midline, moist mucous membranes, absent tonsils  NECK: Normal range of motion, no thyromegaly, trachea is midline, no palpable lymphadenopathy or neck masses, no crepitus  NEURO: Cranial Nerves 2, 3, 4, 5, 6, 7, 11, 12 grossly intact bilaterally     I have performed a head and neck physical exam personally or was physically present during the key or critical portions of the service. Data/Imaging Review     Comprehensive audiological evaluation performed in the office today by Silvaan DOMINIQUE was independently reviewed by myself which demonstrates:    As: Moderate mixed hearing loss    Ad:  Severe mixed hearing loss rising to moderate mixed hearing loss    WRS 96% right, WRS 96% left. Type As tympanogram right. Type A tympanogram left. Assessment and Plan     1. Mixed conductive and sensorineural hearing loss of both ears  - Bone conduction thresholds essentially symmetric  - Would likely benefit from amplification with hearing aids. She would like to hold off on pursuing these at this time  - Encouraged loud noise avoidance and wearing of hearing protection when exposed. - Recommend surveillance of hearing with comprehensive audiological evaluation 1 year. 2. Allergic rhinitis, unspecified seasonality, unspecified trigger  3. Hypertrophy of both inferior nasal turbinates  - Continue Flonase daily. Add Astelin nasal spray BID  - Continue Claritin and Singulair daily      Follow Up     Return if symptoms worsen or fail to improve. Haroon Zimmerman   Department of Otolaryngology/Head & Neck Surgery  3/31/22    Medical Decision Making: The following items were considered in medical decision making:  Independent review of images  Review / order clinical lab tests  Review / order radiology tests  Decision to obtain old records    This note was generated completely or in part utilizing Dragon dictation speech recognition software. Occasionally, words are mistranscribed and despite editing, the text may contain inaccuracies due to incorrect word recognition. If further clarification is needed please contact the office at [phone].

## 2022-04-26 DIAGNOSIS — E11.9 TYPE 2 DIABETES MELLITUS WITHOUT COMPLICATION, WITHOUT LONG-TERM CURRENT USE OF INSULIN (HCC): ICD-10-CM

## 2022-04-26 RX ORDER — DULAGLUTIDE 1.5 MG/.5ML
INJECTION, SOLUTION SUBCUTANEOUS
Qty: 2 ML | Refills: 1 | Status: SHIPPED | OUTPATIENT
Start: 2022-04-26 | End: 2022-06-21

## 2022-05-02 RX ORDER — OMEPRAZOLE 40 MG/1
CAPSULE, DELAYED RELEASE ORAL
Qty: 30 CAPSULE | Refills: 5 | Status: SHIPPED | OUTPATIENT
Start: 2022-05-02

## 2022-05-31 RX ORDER — MONTELUKAST SODIUM 10 MG/1
TABLET ORAL
Qty: 30 TABLET | Refills: 5 | Status: SHIPPED | OUTPATIENT
Start: 2022-05-31 | End: 2022-06-21

## 2022-06-06 ENCOUNTER — OFFICE VISIT (OUTPATIENT)
Dept: INTERNAL MEDICINE CLINIC | Age: 62
End: 2022-06-06
Payer: COMMERCIAL

## 2022-06-06 VITALS
BODY MASS INDEX: 26.41 KG/M2 | DIASTOLIC BLOOD PRESSURE: 90 MMHG | WEIGHT: 144.4 LBS | OXYGEN SATURATION: 97 % | SYSTOLIC BLOOD PRESSURE: 142 MMHG | HEART RATE: 92 BPM

## 2022-06-06 DIAGNOSIS — M65.351 TRIGGER LITTLE FINGER OF RIGHT HAND: ICD-10-CM

## 2022-06-06 DIAGNOSIS — E11.65 TYPE 2 DIABETES MELLITUS WITH HYPERGLYCEMIA, WITHOUT LONG-TERM CURRENT USE OF INSULIN (HCC): Primary | ICD-10-CM

## 2022-06-06 DIAGNOSIS — J30.9 ALLERGIC RHINITIS, UNSPECIFIED SEASONALITY, UNSPECIFIED TRIGGER: ICD-10-CM

## 2022-06-06 DIAGNOSIS — I10 ESSENTIAL HYPERTENSION: ICD-10-CM

## 2022-06-06 DIAGNOSIS — M81.0 AGE-RELATED OSTEOPOROSIS WITHOUT CURRENT PATHOLOGICAL FRACTURE: ICD-10-CM

## 2022-06-06 DIAGNOSIS — E11.9 TYPE 2 DIABETES MELLITUS WITHOUT COMPLICATION, WITHOUT LONG-TERM CURRENT USE OF INSULIN (HCC): ICD-10-CM

## 2022-06-06 DIAGNOSIS — J30.2 SEASONAL ALLERGIES: ICD-10-CM

## 2022-06-06 LAB
A/G RATIO: 1.5 (ref 1.1–2.2)
ALBUMIN SERPL-MCNC: 4.6 G/DL (ref 3.4–5)
ALP BLD-CCNC: 100 U/L (ref 40–129)
ALT SERPL-CCNC: 15 U/L (ref 10–40)
ANION GAP SERPL CALCULATED.3IONS-SCNC: 17 MMOL/L (ref 3–16)
AST SERPL-CCNC: 17 U/L (ref 15–37)
BILIRUB SERPL-MCNC: 0.9 MG/DL (ref 0–1)
BUN BLDV-MCNC: 10 MG/DL (ref 7–20)
CALCIUM SERPL-MCNC: 10.2 MG/DL (ref 8.3–10.6)
CHLORIDE BLD-SCNC: 104 MMOL/L (ref 99–110)
CO2: 20 MMOL/L (ref 21–32)
CREAT SERPL-MCNC: 0.6 MG/DL (ref 0.6–1.2)
GFR AFRICAN AMERICAN: >60
GFR NON-AFRICAN AMERICAN: >60
GLUCOSE BLD-MCNC: 117 MG/DL (ref 70–99)
POTASSIUM SERPL-SCNC: 4.4 MMOL/L (ref 3.5–5.1)
SODIUM BLD-SCNC: 141 MMOL/L (ref 136–145)
TOTAL PROTEIN: 7.6 G/DL (ref 6.4–8.2)

## 2022-06-06 PROCEDURE — 3017F COLORECTAL CA SCREEN DOC REV: CPT | Performed by: INTERNAL MEDICINE

## 2022-06-06 PROCEDURE — 1036F TOBACCO NON-USER: CPT | Performed by: INTERNAL MEDICINE

## 2022-06-06 PROCEDURE — 99214 OFFICE O/P EST MOD 30 MIN: CPT | Performed by: INTERNAL MEDICINE

## 2022-06-06 PROCEDURE — 3051F HG A1C>EQUAL 7.0%<8.0%: CPT | Performed by: INTERNAL MEDICINE

## 2022-06-06 PROCEDURE — 2022F DILAT RTA XM EVC RTNOPTHY: CPT | Performed by: INTERNAL MEDICINE

## 2022-06-06 PROCEDURE — G8419 CALC BMI OUT NRM PARAM NOF/U: HCPCS | Performed by: INTERNAL MEDICINE

## 2022-06-06 PROCEDURE — G8427 DOCREV CUR MEDS BY ELIG CLIN: HCPCS | Performed by: INTERNAL MEDICINE

## 2022-06-06 RX ORDER — BISOPROLOL FUMARATE AND HYDROCHLOROTHIAZIDE 5; 6.25 MG/1; MG/1
1 TABLET ORAL DAILY
Qty: 30 TABLET | Refills: 3 | Status: SHIPPED | OUTPATIENT
Start: 2022-06-06

## 2022-06-06 RX ORDER — GLUCOSAMINE HCL/CHONDROITIN SU 500-400 MG
CAPSULE ORAL
Qty: 100 STRIP | Refills: 5 | Status: SHIPPED | OUTPATIENT
Start: 2022-06-06

## 2022-06-06 RX ORDER — LANCETS 30 GAUGE
1 EACH MISCELLANEOUS 2 TIMES DAILY
Qty: 100 EACH | Refills: 5 | Status: SHIPPED | OUTPATIENT
Start: 2022-06-06

## 2022-06-06 ASSESSMENT — PATIENT HEALTH QUESTIONNAIRE - PHQ9
2. FEELING DOWN, DEPRESSED OR HOPELESS: 0
SUM OF ALL RESPONSES TO PHQ QUESTIONS 1-9: 0
1. LITTLE INTEREST OR PLEASURE IN DOING THINGS: 0
SUM OF ALL RESPONSES TO PHQ9 QUESTIONS 1 & 2: 0
SUM OF ALL RESPONSES TO PHQ QUESTIONS 1-9: 0

## 2022-06-06 ASSESSMENT — ENCOUNTER SYMPTOMS
CHEST TIGHTNESS: 0
SHORTNESS OF BREATH: 0
CONSTIPATION: 0
DIARRHEA: 0
ABDOMINAL PAIN: 0

## 2022-06-06 NOTE — PATIENT INSTRUCTIONS
I am adding a medication called bisoprolol-HCT 5/6.25 mg. This is a blood pressure medicine that is protective of your heart. It does have a little bit of a water pill in it so it will make you urinate so take it in the morning. Check blood sugars first thing in the morning before you eat 3-4 days per week check your sugars 2 hours after your largest meal.   Normal blood sugar (non-diabetic) is under 100 before breakfast and under 120 after meal, goal blood sugar with diabetes is under 150 (ideal under 120) before breakfast and after meals. Low blood sugar symptoms:  - fatigue  - confusion  - hunger  - sweating  - nausea  - shaky    If you feel like this, check your blood sugar. If your sugar is under 100, drink 4 oz (1/2 cup) of regular soda or juice and eat a snack with fat and protein (peanut butter and crackers, cheese and crackers). If it is over 100, just eat a snack with fat and protein.

## 2022-06-06 NOTE — PROGRESS NOTES
Patient: Barton Essex is a 57 y.o. female who presents today with the following Chief Complaint(s):  Chief Complaint   Patient presents with    Check-Up       HPI     Here today for follow up. Had blood work done this morning. DM- has not been checking her blood sugars. Has been doing better with her diet with a few indiscretions. Is exercising- walking on treadmill and riding stationary bike and doing core exercises- tries to do at least 4 times per week but tries to exercise daily. Taking Lantus 60 units qhs, glipizide 5 mg 1/2 hour prior to meals, Jardiance daily in the mornings, metformin BID (occasional diarrhea), and Trulicity on Tuesdays. Denies low blood sugars. Is to have a tooth extracted on 6/30/22. Is taking Clindamycin prior to prior extraction. Is worried about healing with the tooth and her DM. Did see Dr. Fran Hernandez- had hearing test done and does have hearing loss but not severe enough for hearing aid. Is c/o allergy sx- was given Astelin by Dr. Fran Hernanedz. Tries not to use it often as it causes nausea from drainage but will use it if she is \"really bad\" in the mornings. Is using Flonase nightly. Does take Claritin and Singulair daily. HTN- takes valsartan at HS. Was 136/80 at last doctor's appointment. Does get Prolia from oncology. Is being delayed until after her tooth extraction. No difficulty getting her medications. C/O occasional toe cramps, no paresthesias. Does have good sensation. C/O trigger finger right 5th finger- will get stuck after eating, occasionally have sharp, shooting pain in finger as well. Had severe wrist fracture requiring ORIF and does have residual wrist deformity on the right.      Wt Readings from Last 3 Encounters:   06/06/22 144 lb 6.4 oz (65.5 kg)   03/31/22 150 lb (68 kg)   03/04/22 149 lb 9.6 oz (67.9 kg)     Temp Readings from Last 3 Encounters:   10/06/21 97.7 °F (36.5 °C) (Temporal)   06/14/21 97.9 °F (36.6 °C) (Skin)   06/09/21 97.4 °F (36.3 °C)     BP Readings from Last 3 Encounters:   06/06/22 (!) 142/90   03/31/22 (!) 155/80   03/04/22 130/74     Pulse Readings from Last 3 Encounters:   06/06/22 92   03/04/22 88   10/06/21 96           Allergies   Allergen Reactions    Cefdinir Other (See Comments)     *Cephalosporins*: Pruritus.  Erythromycin Swelling     Throat swelling.  Ezetimibe-Simvastatin Other (See Comments)     Muscle cramps.  Lisinopril Other (See Comments)     Cough.  Niacin And Related Other (See Comments)     Niacin (antihyperlipidemic) *Antihyperlipidmics*: S.E.   Gloria[employer] Other Other (See Comments)     darvocet    Pcn [Penicillins] Hives    Zithromax [Azithromycin] Swelling     Throat swelling.        Past Medical History:   Diagnosis Date    Allergic rhinitis, cause unspecified     Cancer ([address]     left breast    Generalized osteoarthrosis, involving multiple sites     Pure hypercholesterolemia     Pure hyperglyceridemia     Type II or unspecified type diabetes mellitus without mention of complication, uncontrolled     Unspecified essential hypertension       Past Surgical History:   Procedure Laterality Date    CHOLECYSTECTOMY, LAPAROSCOPIC N/A 12/19/2018    LAPAROSCOPIC CHOLECYSTECTOMY; CHOLANGIOGRAM performed by Cj Guerrero MD at [address]      colonoscopy ordered 6/2010; 5/2011; 11/2011, recommended again and refused at this time on 3/30/12 & 4/2012     COLONOSCOPY N/A 11/29/2018    COLONOSCOPY POLYPECTOMY SNARE/COLD BIOPSY performed by Amina Harris MD at [facility] 04/18/2019    Skin sparing mastectomy Sentinal Lymph node Bx Tissue expander    NASAL SEPTUM SURGERY      TONSILLECTOMY      WRIST SURGERY Right       Social History     Socioeconomic History    Marital status: Single     Spouse name: Not on file    Number of children: Not on file    Years of education: Not on file    Highest education level: Not on file   Occupational History    Not on file   Tobacco Use    Smoking status: Never Smoker    Smokeless tobacco: Never Used   Vaping Use    Vaping Use: Never used   Substance and Sexual Activity    Alcohol use: No     Alcohol/week: 0.0 standard drinks    Drug use: No    Sexual activity: Not on file   Other Topics Concern    Not on file   Social History Narrative    Not on file     Social Determinants of Health     Financial Resource Strain: Low Risk     Difficulty of Paying Living Expenses: Not hard at all   Food Insecurity: No Food Insecurity    Worried About Running Out of Food in the Last Year: Never true    Martin of Food in the Last Year: Never true   Transportation Needs:     Lack of Transportation (Medical): Not on file    Lack of Transportation (Non-Medical):  Not on file   Physical Activity:     Days of Exercise per Week: Not on file    Minutes of Exercise per Session: Not on file   Stress:     Feeling of Stress : Not on file   Social Connections:     Frequency of Communication with Friends and Family: Not on file    Frequency of Social Gatherings with Friends and Family: Not on file    Attends Oriental orthodox Services: Not on file    Active Member of [address] [place] [address] or Organizations: Not on file    Attends Club or Organization Meetings: Not on file    Marital Status: Not on file   Intimate Partner Violence:     Fear of Current or Ex-Partner: Not on file    Emotionally Abused: Not on file    Physically Abused: Not on file    Sexually Abused: Not on file   Housing Stability:     Unable to Pay for Housing in the Last Year: Not on file    Number of [place] in the Last Year: Not on file    Unstable Housing in the Last Year: Not on file     Family History   Problem Relation Age of Onset    Asthma Maternal Grandmother     Hypertension Maternal Grandmother     Breast Cancer Maternal Grandmother 76    Heart Disease Maternal Grandfather     Hypertension Paternal Grandmother     Heart Disease Paternal Grandfather  Hypertension Paternal Grandfather     COPD Father         Outpatient Medications Prior to Visit   Medication Sig Dispense Refill    montelukast (SINGULAIR) 10 MG tablet TAKE ONE TABLET BY MOUTH DAILY 30 tablet 5    omeprazole (PRILOSEC) 40 MG delayed release capsule TAKE ONE CAPSULE BY MOUTH DAILY 30 capsule 5    TRULICITY 1.5 YB/4.7NH SOPN INJECT 1.5 MG UNDER THE SKIN ONCE WEEKLY 2 mL 1    azelastine (ASTELIN) 0.1 % nasal spray 1 spray by Nasal route 2 times daily Use in each nostril as directed 60 mL 1    JARDIANCE 25 MG tablet TAKE ONE TABLET BY MOUTH DAILY 30 tablet 5    rosuvastatin (CRESTOR) 10 MG tablet TAKE ONE TABLET BY MOUTH DAILY 90 tablet 1    loratadine (CLARITIN) 10 MG tablet TAKE ONE TABLET BY MOUTH DAILY 30 tablet 3    fluticasone (FLONASE) 50 MCG/ACT nasal spray SPRAY TWO SPRAYS IN EACH NOSTRIL ONCE DAILY 3 each 3    metFORMIN (GLUCOPHAGE) 850 MG tablet TAKE ONE TABLET BY MOUTH TWICE A DAY WITH A MEAL 180 tablet 3    valsartan (DIOVAN) 320 MG tablet TAKE ONE TABLET BY MOUTH DAILY 17 tablet 3    KROGER PEN NEEDLES 31G X 6 MM MISC USE ONE - DAILY 100 each 3    sodium chloride (OCEAN, BABY AYR) 0.65 % nasal spray 1 spray by Nasal route as needed for Congestion      insulin glargine (LANTUS SOLOSTAR) 100 UNIT/ML injection pen Inject 60 Units into the skin nightly 10 pen 5    glipiZIDE (GLUCOTROL) 5 MG tablet Take 1 tablet by mouth 2 times daily (before meals) 60 tablet 5    blood glucose monitor kit and supplies Test BID times a day & as needed for symptoms of irregular blood glucose. 1 kit 0    blood glucose monitor strips Test BID  times a day & as needed for symptoms of irregular blood glucose.  100 strip 5    vitamin C (ASCORBIC ACID) 500 MG tablet Take 500 mg by mouth daily      CALCIUM PO Take by mouth 2 times daily      Denosumab (PROLIA SC) Inject into the skin every 6 months      anastrozole (ARIMIDEX) 1 MG tablet Take 1 mg by mouth nightly      Simethicone 180 MG CAPS Take by mouth 2 times daily as needed      Cholecalciferol (VITAMIN D3) 2000 units CAPS Take by mouth daily       Probiotic Product (PROBIOTIC DAILY PO) Take by mouth daily       clindamycin (CLEOCIN) 300 MG capsule TAKE ONE CAPSULE BY MOUTH THREE TIMES A DAY FOR 10 DAYS 30 capsule 0    Lancets MISC 1 each by Does not apply route 2 times daily 100 each 5    Lancet Devices (SOFT TOUCH LANCET DEVICE) MISC by Does not apply route daily. No facility-administered medications prior to visit. Patient'spast medical history, surgical history, family history, medications,  and allergies  were all reviewed and updated as appropriate today. Review of Systems   Constitutional: Negative for appetite change, fatigue, fever and unexpected weight change. Eyes: Negative for visual disturbance. Respiratory: Negative for chest tightness and shortness of breath. Cardiovascular: Negative for chest pain. Gastrointestinal: Negative for abdominal pain, constipation and diarrhea. Endocrine: Negative for cold intolerance, heat intolerance, polydipsia, polyphagia and polyuria. No low blood sugars   Skin: Negative for rash. BP (!) 142/90   Pulse 92   Wt 144 lb 6.4 oz (65.5 kg)   SpO2 97%   BMI 26.41 kg/m²   Physical Exam  Vitals and nursing note reviewed. Constitutional:       Appearance: She is well-developed. She is not toxic-appearing. HENT:      Head: Normocephalic. Right Ear: Tympanic membrane, ear canal and external ear normal. Decreased hearing noted. Left Ear: Tympanic membrane, ear canal and external ear normal. Decreased hearing noted. Mouth/Throat:      Pharynx: No oropharyngeal exudate or posterior oropharyngeal erythema. Eyes:      General: No scleral icterus. Extraocular Movements: Extraocular movements intact. Conjunctiva/sclera: Conjunctivae normal.      Pupils: Pupils are equal, round, and reactive to light.    Neck:      Thyroid: No thyroid mass or thyromegaly. Vascular: No carotid bruit. Cardiovascular:      Rate and Rhythm: Normal rate and regular rhythm. Pulses:           Dorsalis pedis pulses are 2+ on the right side and 2+ on the left side. Posterior tibial pulses are 2+ on the right side and 2+ on the left side. Heart sounds: Normal heart sounds. No murmur heard. Pulmonary:      Effort: Pulmonary effort is normal.      Breath sounds: Normal breath sounds. Abdominal:      Palpations: Abdomen is soft. Tenderness: There is no abdominal tenderness. Musculoskeletal:      Right lower leg: No edema. Left lower leg: No edema. Right foot: Normal range of motion. No deformity, bunion, Charcot foot, foot drop or prominent metatarsal heads. Left foot: Normal range of motion. No deformity, bunion, Charcot foot, foot drop or prominent metatarsal heads. Feet:      Right foot:      Protective Sensation: 10 sites tested. 10 sites sensed. Skin integrity: Skin integrity normal.      Toenail Condition: Right toenails are normal.      Left foot:      Protective Sensation: 10 sites tested. 10 sites sensed. Skin integrity: Skin integrity normal.      Toenail Condition: Left toenails are normal.   Lymphadenopathy:      Cervical: No cervical adenopathy. Neurological:      General: No focal deficit present. Mental Status: She is alert and oriented to person, place, and time. Psychiatric:         Mood and Affect: Mood is anxious. Behavior: Behavior normal. Behavior is cooperative. ASSESSMENT/PLAN:    Problem List Items Addressed This Visit     Age-related osteoporosis without current pathological fracture      Receiving Prolia injections from oncology. RESOLVED: Allergic rhinitis    Essential hypertension     Above goal.  Add Ziac 5/6.25 mg daily and continue valsartan 320 mg daily. Seasonal allergies      Continue Claritin, Singulair, Flonase and Astelin.          Trigger little finger of right hand     Offered referral to orthopedics which she is declined at this time. States she will let me know when it worsens. Type 2 diabetes mellitus without complication, without long-term current use of insulin ([address] - Primary      Results pending. If hemoglobin A1c is not below 7, change Lantus and glipizide to NovoLog 70/30 mix 30 units twice daily with meals. Discussed with patient. Continue Trulicity 1.5 mg weekly, Jardiance 25 mg daily, and metformin 850 mg twice daily. Patient given order for glucometer and testing supplies she has not been checking her blood sugars. Instructed patient to check sugars fasting and 2 hours after largest meal.  Viewed signs and symptoms of hypoglycemia with patient.          Relevant Medications    blood glucose monitor strips    blood glucose monitor kit and supplies    Lancets MISC          Current Outpatient Medications   Medication Sig Dispense Refill    blood glucose monitor strips Test 2 times a day & as needed for symptoms of irregular blood glucose 100 strip 5    blood glucose monitor kit and supplies Check sugars fasting and 2 hours after largest meal. 1 kit 0    Lancets MISC 1 each by Does not apply route 2 times daily 100 each 5    bisoprolol-hydroCHLOROthiazide (ZIAC) 5-6.25 MG per tablet Take 1 tablet by mouth daily 30 tablet 3    montelukast (SINGULAIR) 10 MG tablet TAKE ONE TABLET BY MOUTH DAILY 30 tablet 5    omeprazole (PRILOSEC) 40 MG delayed release capsule TAKE ONE CAPSULE BY MOUTH DAILY 30 capsule 5    TRULICITY 1.5 QF/9.7SU SOPN INJECT 1.5 MG UNDER THE SKIN ONCE WEEKLY 2 mL 1    azelastine (ASTELIN) 0.1 % nasal spray 1 spray by Nasal route 2 times daily Use in each nostril as directed 60 mL 1    JARDIANCE 25 MG tablet TAKE ONE TABLET BY MOUTH DAILY 30 tablet 5    rosuvastatin (CRESTOR) 10 MG tablet TAKE ONE TABLET BY MOUTH DAILY 90 tablet 1    loratadine (CLARITIN) 10 MG tablet TAKE ONE TABLET BY MOUTH DAILY 30 tablet 3    fluticasone (FLONASE) 50 MCG/ACT nasal spray SPRAY TWO SPRAYS IN EACH NOSTRIL ONCE DAILY 3 each 3    metFORMIN (GLUCOPHAGE) 850 MG tablet TAKE ONE TABLET BY MOUTH TWICE A DAY WITH A MEAL 180 tablet 3    valsartan (DIOVAN) 320 MG tablet TAKE ONE TABLET BY MOUTH DAILY 17 tablet 3    KROGER PEN NEEDLES 31G X 6 MM MISC USE ONE - DAILY 100 each 3    sodium chloride (OCEAN, BABY AYR) 0.65 % nasal spray 1 spray by Nasal route as needed for Congestion      insulin glargine (LANTUS SOLOSTAR) 100 UNIT/ML injection pen Inject 60 Units into the skin nightly 10 pen 5    glipiZIDE (GLUCOTROL) 5 MG tablet Take 1 tablet by mouth 2 times daily (before meals) 60 tablet 5    blood glucose monitor kit and supplies Test BID times a day & as needed for symptoms of irregular blood glucose. 1 kit 0    blood glucose monitor strips Test BID  times a day & as needed for symptoms of irregular blood glucose. 100 strip 5    vitamin C (ASCORBIC ACID) 500 MG tablet Take 500 mg by mouth daily      CALCIUM PO Take by mouth 2 times daily      Denosumab (PROLIA SC) Inject into the skin every 6 months      anastrozole (ARIMIDEX) 1 MG tablet Take 1 mg by mouth nightly      Simethicone 180 MG CAPS Take by mouth 2 times daily as needed      Cholecalciferol (VITAMIN D3) 2000 units CAPS Take by mouth daily       Probiotic Product (PROBIOTIC DAILY PO) Take by mouth daily        No current facility-administered medications for this visit. Return in about 3 months (around 9/6/2022) for labs prior.

## 2022-06-06 NOTE — ASSESSMENT & PLAN NOTE
Offered referral to orthopedics which she is declined at this time. States she will let me know when it worsens.

## 2022-06-06 NOTE — ASSESSMENT & PLAN NOTE
Above goal.  Add Ziac 5/6.25 mg daily and continue valsartan 320 mg daily. [FreeTextEntry1] : Continue jtube care, with flushing pre and post usage.\par Will refer to IR for jtube exchange.

## 2022-06-06 NOTE — ASSESSMENT & PLAN NOTE
Results pending. If hemoglobin A1c is not below 7, change Lantus and glipizide to NovoLog 70/30 mix 30 units twice daily with meals. Discussed with patient. Continue Trulicity 1.5 mg weekly, Jardiance 25 mg daily, and metformin 850 mg twice daily. Patient given order for glucometer and testing supplies she has not been checking her blood sugars. Instructed patient to check sugars fasting and 2 hours after largest meal.  Viewed signs and symptoms of hypoglycemia with patient.

## 2022-06-07 ENCOUNTER — TELEPHONE (OUTPATIENT)
Dept: INTERNAL MEDICINE CLINIC | Age: 62
End: 2022-06-07

## 2022-06-07 LAB
ESTIMATED AVERAGE GLUCOSE: 162.8 MG/DL
HBA1C MFR BLD: 7.3 %

## 2022-06-07 NOTE — TELEPHONE ENCOUNTER
It is a very tiny dose of a water pill (6.25 mg, usual dose is 25 mg) and is much less of a water pill than Lasix. She should be fine.  saw

## 2022-06-07 NOTE — TELEPHONE ENCOUNTER
Patient called in today because she forgot to mention to Dr. Luis Alberto Thompson that in the past when she was on her BP medication she was put on lasix as well but it made her very light headed and she had heart palpitations. The new medication prescribed to her bisoprolol-hydroCHLOROthiazide (ZIAC) 5-6.25 MG per tablet she said has a little bit of a water pill with it and she just wants to make sure that it is safe for her to take. Please call patient back at soonest convenience .

## 2022-06-10 RX ORDER — INSULIN GLARGINE 100 [IU]/ML
INJECTION, SOLUTION SUBCUTANEOUS
Qty: 15 ML | Refills: 1 | Status: SHIPPED | OUTPATIENT
Start: 2022-06-10 | End: 2022-07-20

## 2022-06-20 ENCOUNTER — HOSPITAL ENCOUNTER (OUTPATIENT)
Dept: WOMENS IMAGING | Age: 62
Discharge: HOME | End: 2022-06-20
Payer: COMMERCIAL

## 2022-06-20 VITALS — BODY MASS INDEX: 25.76 KG/M2 | WEIGHT: 140 LBS | HEIGHT: 62 IN

## 2022-06-20 DIAGNOSIS — Z85.3 H/O MALIGNANT NEOPLASM OF BREAST: ICD-10-CM

## 2022-06-20 DIAGNOSIS — Z12.31 ENCOUNTER FOR SCREENING MAMMOGRAM FOR MALIGNANT NEOPLASM OF BREAST: ICD-10-CM

## 2022-06-20 PROCEDURE — 77063 BREAST TOMOSYNTHESIS BI: CPT

## 2022-06-21 DIAGNOSIS — E11.9 TYPE 2 DIABETES MELLITUS WITHOUT COMPLICATION, WITHOUT LONG-TERM CURRENT USE OF INSULIN (HCC): ICD-10-CM

## 2022-06-21 RX ORDER — DULAGLUTIDE 1.5 MG/.5ML
INJECTION, SOLUTION SUBCUTANEOUS
Qty: 2 ML | Refills: 1 | Status: SHIPPED | OUTPATIENT
Start: 2022-06-21 | End: 2022-08-17

## 2022-06-21 RX ORDER — MONTELUKAST SODIUM 10 MG/1
TABLET ORAL
Qty: 30 TABLET | Refills: 5 | Status: SHIPPED | OUTPATIENT
Start: 2022-06-21

## 2022-07-05 RX ORDER — GLIPIZIDE 5 MG/1
TABLET ORAL
Qty: 60 TABLET | Refills: 5 | Status: SHIPPED | OUTPATIENT
Start: 2022-07-05 | End: 2022-07-06

## 2022-07-06 RX ORDER — GLIPIZIDE 5 MG/1
TABLET ORAL
Qty: 60 TABLET | Refills: 5 | Status: SHIPPED | OUTPATIENT
Start: 2022-07-06

## 2022-07-20 RX ORDER — INSULIN GLARGINE 100 [IU]/ML
INJECTION, SOLUTION SUBCUTANEOUS
Qty: 15 ML | Refills: 1 | Status: SHIPPED | OUTPATIENT
Start: 2022-07-20 | End: 2022-09-13

## 2022-07-26 ENCOUNTER — OFFICE VISIT (OUTPATIENT)
Dept: SURGERY | Age: 62
End: 2022-07-26
Payer: COMMERCIAL

## 2022-07-26 VITALS
DIASTOLIC BLOOD PRESSURE: 68 MMHG | HEART RATE: 84 BPM | OXYGEN SATURATION: 96 % | BODY MASS INDEX: 26.91 KG/M2 | HEIGHT: 62 IN | SYSTOLIC BLOOD PRESSURE: 132 MMHG | RESPIRATION RATE: 18 BRPM | WEIGHT: 146.2 LBS

## 2022-07-26 DIAGNOSIS — Z85.3 ENCOUNTER FOR FOLLOW-UP SURVEILLANCE OF BREAST CANCER: ICD-10-CM

## 2022-07-26 DIAGNOSIS — Z08 ENCOUNTER FOR FOLLOW-UP SURVEILLANCE OF BREAST CANCER: ICD-10-CM

## 2022-07-26 DIAGNOSIS — Z90.11 HISTORY OF RIGHT MASTECTOMY: ICD-10-CM

## 2022-07-26 DIAGNOSIS — Z12.31 ENCOUNTER FOR SCREENING MAMMOGRAM FOR MALIGNANT NEOPLASM OF BREAST: Primary | ICD-10-CM

## 2022-07-26 DIAGNOSIS — Z85.3 PERSONAL HISTORY OF BREAST CANCER: ICD-10-CM

## 2022-07-26 PROCEDURE — G8427 DOCREV CUR MEDS BY ELIG CLIN: HCPCS | Performed by: NURSE PRACTITIONER

## 2022-07-26 PROCEDURE — 1036F TOBACCO NON-USER: CPT | Performed by: NURSE PRACTITIONER

## 2022-07-26 PROCEDURE — G8419 CALC BMI OUT NRM PARAM NOF/U: HCPCS | Performed by: NURSE PRACTITIONER

## 2022-07-26 PROCEDURE — 99213 OFFICE O/P EST LOW 20 MIN: CPT | Performed by: NURSE PRACTITIONER

## 2022-07-26 PROCEDURE — 3017F COLORECTAL CA SCREEN DOC REV: CPT | Performed by: NURSE PRACTITIONER

## 2022-07-26 NOTE — PATIENT INSTRUCTIONS
Healthy Lifestyle Recommendations: healthy diet (decrease consumption of red meat, increase fresh fruits and vegetables), decreased alcohol consumption (less than 4 drinks/week), adequate sleep (goal 6-8 hours), routine exercise (goal 150 minutes/week or greater), weight control. Patient Education        Breast Self-Exam: Care Instructions  Your Care Instructions     A breast self-exam is when you check your breasts for lumps or changes. This regular exam helps you learn how your breasts normally look and feel. Mostbreast problems or changes are not because of cancer. Breast self-exam is not a substitute for a mammogram. Having regular breast exams by your doctor and regular mammograms improve your chances of finding anyproblems with your breasts. Some women set a time each month to do a step-by-step breast self-exam. Other women like a less formal system. They might look at their breasts as they brushtheir teeth, or feel their breasts once in a while in the shower. If you notice a change in your breast, tell your doctor. Follow-up care is a key part of your treatment and safety. Be sure to make and go to all appointments, and call your doctor if you are having problems. It's also a good idea to know your test results and keep alist of the medicines you take. How do you do a breast self-exam?  The best time to examine your breasts is usually one week after your menstrual period begins. Your breasts should not be tender then. If you do not have periods, you might do your exam on a day of the month that is easy to remember. To examine your breasts:  Remove all your clothes above the waist and lie down. When you are lying down, your breast tissue spreads evenly over your chest wall, which makes it easier to feel all your breast tissue. Use the pads--not the fingertips--of the 3 middle fingers of your left hand to check your right breast. Move your fingers slowly in small coin-sized circles that overlap.   Use three levels of pressure to feel of all your breast tissue. Use light pressure to feel the tissue close to the skin surface. Use medium pressure to feel a little deeper. Use firm pressure to feel your tissue close to your breastbone and ribs. Use each pressure level to feel your breast tissue before moving on to the next spot. Check your entire breast, moving up and down as if following a strip from the collarbone to the bra line, and from the armpit to the ribs. Repeat until you have covered the entire breast.  Repeat this procedure for your left breast, using the pads of the 3 middle fingers of your right hand. To examine your breasts while in the shower:  Place one arm over your head and lightly soap your breast on that side. Using the pads of your fingers, gently move your hand over your breast (in the strip pattern described above), feeling carefully for any lumps or changes. Repeat for the other breast.  Have your doctor inspect anything you notice to see if you need further testing. Where can you learn more? Go to https://[employer]. org and sign in to your [employer] account. Enter P148 in the [facility] box to learn more about \"Breast Self-Exam: Care Instructions. \"     If you do not have an account, please click on the \"Sign Up Now\" link. Current as of: September 8, 2021               Content Version: 13.3  © 2695-7065 [employer]. Care instructions adapted under license by [facility] ([facility]). If you have questions about a medical condition or this instruction, always ask your healthcare professional. [place] [address] any warranty or liability for your use of this information.

## 2022-07-26 NOTE — PROGRESS NOTES
[facility]  Surgical Breast Oncology      Medical Oncologist: Dr. Katerin Pichardo Oncologist:   Other: Tristin Pac      CC: Annual iopugh-[id number] for breast check and mammogram results     pT1cN0  STAGE:  IA left breast cancer     HPI: Hillary Saleem is a 57 y.o. woman here for annual follow up for breast check secondary to personal history of left breast cancer. She is s/p left mastectomy with SLNB (0/4) on 4/18/2018 for 1.3 cm of grade 2 invasive ductal carcinoma with grade 3 DCIS, ER positive WI negative HER2 negative, negative margins. S/p TE recon. S/p left breast infection and removal of expander. She is on Arimidex and tolerating it well. She has no breast related concerns today. She states that she does perform routine self breast evaluations and has not noticed any new abnormalities such as masses, skin changes, color changes,nipple discharge, or changes to the nipple-areolar complex. Denies unintentional weight loss, bone pain, headaches and has no other systemic complaints. INTERVAL HX:  On 4/18/2018 she underwent a left mastectomy with sentinel lymph node biopsy. Pathology identified 1.3 cm of grade 2 invasive ductal carcinoma with grade 3 DCIS. ER positive WI negative HER-2 negative (repeat testing). Margins were negative. There were 0/4 lymph nodes involved carcinoma. On 3/29/2019 she underwent screening right breast mammogram. There are no concerning findings suggestive of malignancy. BI-RADS 1. On 12/26/2019 she underwent ultrasound of the left breast wall for a palpable concern. There is a benign-appearing cystic lesion noted in her area of palpable concern. BI-RADS 2. On 6/1/2020 she underwent unilateral right screening mammography. The parenchymal pattern is stable. There are no new concerning findings suggestive of malignancy. BI-RADS 2. On 6/14/2021 she underwent unilateral right screening mammography.   There were no concerning findings suggestive of malignancy. BI-RADS 1. On 6/20/2022 she underwent unilateral right screening mammography. No concerning findings suggest malignancy. BI-RADS 1.     Past Medical History:   Diagnosis Date    Allergic rhinitis, cause unspecified     Cancer ([address]     left breast    Generalized osteoarthrosis, involving multiple sites     Pure hypercholesterolemia     Pure hyperglyceridemia     Type II or unspecified type diabetes mellitus without mention of complication, uncontrolled     Unspecified essential hypertension        Past Surgical History:   Procedure Laterality Date    CHOLECYSTECTOMY, LAPAROSCOPIC N/A 12/19/2018    LAPAROSCOPIC CHOLECYSTECTOMY; CHOLANGIOGRAM performed by Luther Kc MD at [address]      colonoscopy ordered 6/2010; 5/2011; 11/2011, recommended again and refused at this time on 3/30/12 & 4/2012     COLONOSCOPY N/A 11/29/2018    COLONOSCOPY POLYPECTOMY SNARE/COLD BIOPSY performed by Frank Tadeo MD at [facility] Left 04/18/2019    Skin sparing mastectomy Sentinal Lymph node Bx Tissue expander    NASAL SEPTUM SURGERY      TONSILLECTOMY      WRIST SURGERY Right        Family History   Problem Relation Age of Onset    Asthma Maternal Grandmother     Hypertension Maternal Grandmother     Breast Cancer Maternal Grandmother 76    Heart Disease Maternal Grandfather     Hypertension Paternal Grandmother     Heart Disease Paternal Grandfather     Hypertension Paternal Grandfather     COPD Father        Allergies as of 07/26/2022 - Fully Reviewed 07/26/2022   Allergen Reaction Noted    Cefdinir Other (See Comments) 06/19/2013    Erythromycin Swelling 06/19/2013    Ezetimibe-simvastatin Other (See Comments) 06/19/2013    Lisinopril Other (See Comments) 06/19/2013    Niacin and related Other (See Comments) 06/19/2013    Other Other (See Comments) 06/19/2013    Pcn [penicillins] Hives 06/19/2013    Zithromax [azithromycin] Swelling 06/19/2013       Social History Nasal route as needed for Congestion      blood glucose monitor kit and supplies Test BID times a day & as needed for symptoms of irregular blood glucose. 1 kit 0    blood glucose monitor strips Test BID  times a day & as needed for symptoms of irregular blood glucose. 100 strip 5    vitamin C (ASCORBIC ACID) 500 MG tablet Take 500 mg by mouth daily      CALCIUM PO Take by mouth 2 times daily      Denosumab (PROLIA SC) Inject into the skin every 6 months      anastrozole (ARIMIDEX) 1 MG tablet Take 1 mg by mouth nightly      Simethicone 180 MG CAPS Take by mouth 2 times daily as needed      Cholecalciferol (VITAMIN D3) 2000 units CAPS Take by mouth daily       Probiotic Product (PROBIOTIC DAILY PO) Take by mouth daily        No current facility-administered medications on file prior to visit. Medications: documentation has been reviewed in the electronic medical record and patient office intake form. REVIEW OF SYSTEMS:  Constitutional: Negative for fever  HENT: Negative for sore throat  Eyes: Negative for redness   Respiratory: Negative for dyspnea, cough  Cardiovascular: Negative for chest pain  Gastrointestinal: Negative for vomiting, diarrhea   Genitourinary: Negative for hematuria   Musculoskeletal: Negative for arthralgias   Skin: Negative for rash  Neurological: Negative for syncope  Hematological: Negative for adenopathy  Psychiatric/Behavorial: Negative for anxiety         PHYSICAL EXAM:  /68   Pulse 84   Resp 18   Ht 5' 2\" (1.575 m)   Wt 146 lb 3.2 oz (66.3 kg)   SpO2 96%   BMI 26.74 kg/m²   Constitutional: She appearswell-nourished. No apparent distress. Breast: The patient was examined in the upright and supine position. Breasts are symmetrically ptotic. Right: No new masses or changes in breast contour. No skin changes of the breast or nipple areolar complex. No nipple inversion or discharge. No erythema, thickening (peau d'orange), or dimpling.         Left: The left breast has been surgically removed. Well-healed mastectomy scar. There is a small amount of medial redundant tissue. No masses. No skin changes. There is no axillary lymphadenopathy palpated bilaterally. Head: Normocephalic and atraumatic:   Eyes: EOM are normal. Pupils are equal, round, and reactive to light. Neck: Neck supple. No tracheal deviation present. No obvious mass. Lymphatics: No palpable supraclavicular, cervical, or axillary lymphadenopathy  Skin: No rash noted. No erythema. Neurologic: alert and oriented. Extremities: appear well perfused. ASSESSMENT:  - Screening Breast Examination - stable breast examination and stable breast imaging. No concerning findings suggestive of malignancy or recurrence at this time. - Personal History of Breast Cancer - s/p left mastectomy with SLNB (0/4) on 4/18/2018 for 1.3 cm of grade 2 invasive ductal carcinoma with grade 3 DCIS, ER positive ME negative HER2 negative, negative margins. S/p TE recon. S/p left breast infection and removal of expander.    - Aromatase inhibitor use - Arimidex per medical oncology   - Encounter for follow-up surveillance of breast cancer    PLAN:   Continue annual screening mammography with tomosynthesis. Due 6/2023  Continue annual clinical breast exam  Continue endocrine therapy per medical oncology   Signs/symptoms of recurrence were reviewed. She verbalizes understanding that she should notify the office if she identifies any abnormalities on self evaluation.   Follow up cancer surveillance discussed   Discussed the importance of breast awareness including the importance and technique of self breast exams  Most recent breast imaging was reviewed, documented above, the results were discussed with the patient, all questions answered  Healthy Lifestyle Recommendations: healthy diet (decrease consumption of red meat, increase fresh fruits and vegetables), decreased alcohol consumption (less than 4 drinks/week), adequate sleep (goal 6-8 hours), routine exercise (goal 150 minutes/week or greater), weight control. Follow up after mammogram in 6/2023          MAKENZIE Whitman-CNP  [facility])   Surgical Breast Oncology   [phone]    All of the patient's questions were answered at this time however, she was encouraged to call the office with any further inquiries. Approximately 25 minutes of time were spent in preparation, direct patient contact, counseling, care coordination, documentation and activities otherwise related to this encounter.

## 2022-07-27 DIAGNOSIS — Z85.3 ENCOUNTER FOR FOLLOW-UP SURVEILLANCE OF BREAST CANCER: Primary | ICD-10-CM

## 2022-07-27 DIAGNOSIS — Z08 ENCOUNTER FOR FOLLOW-UP SURVEILLANCE OF BREAST CANCER: Primary | ICD-10-CM

## 2022-08-03 RX ORDER — ROSUVASTATIN CALCIUM 10 MG/1
TABLET, COATED ORAL
Qty: 90 TABLET | Refills: 1 | Status: SHIPPED | OUTPATIENT
Start: 2022-08-03

## 2022-08-03 RX ORDER — CLINDAMYCIN HYDROCHLORIDE 300 MG/1
CAPSULE ORAL
Qty: 30 CAPSULE | OUTPATIENT
Start: 2022-08-03

## 2022-08-05 DIAGNOSIS — J30.89 SEASONAL ALLERGIC RHINITIS DUE TO OTHER ALLERGIC TRIGGER: ICD-10-CM

## 2022-08-05 DIAGNOSIS — R05.9 COUGH: ICD-10-CM

## 2022-08-05 DIAGNOSIS — R06.7 SNEEZING: ICD-10-CM

## 2022-08-05 DIAGNOSIS — R09.81 NASAL CONGESTION: ICD-10-CM

## 2022-08-08 RX ORDER — AZELASTINE HYDROCHLORIDE 137 UG/1
SPRAY, METERED NASAL
Qty: 30 ML | Refills: 5 | Status: SHIPPED | OUTPATIENT
Start: 2022-08-08

## 2022-08-08 NOTE — TELEPHONE ENCOUNTER
Medication:   Requested Prescriptions     Pending Prescriptions Disp Refills    loratadine (CLARITIN) 10 MG tablet [Pharmacy Med Name: LORATADINE 10 MG TABLET] 30 tablet 3     Sig: TAKE ONE TABLET BY MOUTH DAILY       Last Filled:  3/9/22    Patient Phone Number: [phone] (home)     Last appt: 6/6/2022   Next appt: 9/13/2022

## 2022-08-09 RX ORDER — LORATADINE 10 MG/1
TABLET ORAL
Qty: 30 TABLET | Refills: 3 | Status: SHIPPED | OUTPATIENT
Start: 2022-08-09

## 2022-08-17 DIAGNOSIS — E11.9 TYPE 2 DIABETES MELLITUS WITHOUT COMPLICATION, WITHOUT LONG-TERM CURRENT USE OF INSULIN (HCC): ICD-10-CM

## 2022-08-17 RX ORDER — DULAGLUTIDE 1.5 MG/.5ML
INJECTION, SOLUTION SUBCUTANEOUS
Qty: 2 ML | Refills: 1 | Status: SHIPPED | OUTPATIENT
Start: 2022-08-17 | End: 2022-10-11 | Stop reason: SDUPTHER

## 2022-09-13 RX ORDER — INSULIN GLARGINE 100 [IU]/ML
INJECTION, SOLUTION SUBCUTANEOUS
Qty: 15 ML | Refills: 1 | Status: SHIPPED | OUTPATIENT
Start: 2022-09-13

## 2022-09-23 ENCOUNTER — TELEPHONE (OUTPATIENT)
Dept: ADMINISTRATIVE | Age: 62
End: 2022-09-23

## 2022-09-23 NOTE — TELEPHONE ENCOUNTER
Submitted PA for Trulicity   [facility]: [id number] STATUS: \"No Authorization Required. \"    If this requires a response please respond to the pool. [address]). Please advise patient thank you.

## 2022-09-27 RX ORDER — PEN NEEDLE, DIABETIC 31 G X1/4"
NEEDLE, DISPOSABLE MISCELLANEOUS
Qty: 100 EACH | Refills: 3 | Status: SHIPPED | OUTPATIENT
Start: 2022-09-27

## 2022-09-27 RX ORDER — CLINDAMYCIN HYDROCHLORIDE 300 MG/1
CAPSULE ORAL
Qty: 30 CAPSULE | OUTPATIENT
Start: 2022-09-27

## 2022-10-11 ENCOUNTER — TELEPHONE (OUTPATIENT)
Dept: INTERNAL MEDICINE CLINIC | Age: 62
End: 2022-10-11

## 2022-10-11 DIAGNOSIS — E11.9 TYPE 2 DIABETES MELLITUS WITHOUT COMPLICATION, WITHOUT LONG-TERM CURRENT USE OF INSULIN (HCC): ICD-10-CM

## 2022-10-11 RX ORDER — DULAGLUTIDE 1.5 MG/.5ML
INJECTION, SOLUTION SUBCUTANEOUS
Qty: 2 ML | Refills: 5 | Status: SHIPPED | OUTPATIENT
Start: 2022-10-11

## 2022-10-11 NOTE — TELEPHONE ENCOUNTER
----- Message from Gary Jax sent at 10/10/2022 10:48 AM EDT -----  Subject: Refill Request    QUESTIONS  Name of Medication? TRULICITY 1.5 SN/1.7YV SOPN  Patient-reported dosage and instructions? INJECT 1.5 MG UNDER THE SKIN   ONCE WEEKLY  How many days do you have left? 2  Preferred Pharmacy? [facility] #[id number]  Pharmacy phone number (if available)? [phone]  ---------------------------------------------------------------------------  --------------  Barbi Hopes INFO  What is the best way for the office to contact you? OK to leave message on   voicemail  Preferred Call Back Phone Number? [phone]  ---------------------------------------------------------------------------  --------------  SCRIPT ANSWERS  Relationship to Patient?  Self

## 2022-10-24 RX ORDER — ROSUVASTATIN CALCIUM 10 MG/1
TABLET, COATED ORAL
Qty: 90 TABLET | Refills: 1 | OUTPATIENT
Start: 2022-10-24

## 2022-11-04 DIAGNOSIS — E11.65 TYPE 2 DIABETES MELLITUS WITH HYPERGLYCEMIA, WITHOUT LONG-TERM CURRENT USE OF INSULIN (HCC): ICD-10-CM

## 2022-11-04 LAB
A/G RATIO: 1.8 (ref 1.1–2.2)
ALBUMIN SERPL-MCNC: 4.4 G/DL (ref 3.4–5)
ALP BLD-CCNC: 89 U/L (ref 40–129)
ALT SERPL-CCNC: 15 U/L (ref 10–40)
ANION GAP SERPL CALCULATED.3IONS-SCNC: 12 MMOL/L (ref 3–16)
AST SERPL-CCNC: 22 U/L (ref 15–37)
BILIRUB SERPL-MCNC: 0.7 MG/DL (ref 0–1)
BUN BLDV-MCNC: 11 MG/DL (ref 7–20)
CALCIUM SERPL-MCNC: 9.3 MG/DL (ref 8.3–10.6)
CHLORIDE BLD-SCNC: 105 MMOL/L (ref 99–110)
CHOLESTEROL, TOTAL: 109 MG/DL (ref 0–199)
CO2: 23 MMOL/L (ref 21–32)
CREAT SERPL-MCNC: 0.7 MG/DL (ref 0.6–1.2)
GFR SERPL CREATININE-BSD FRML MDRD: >60 ML/MIN/{1.73_M2}
GLUCOSE BLD-MCNC: 112 MG/DL (ref 70–99)
HDLC SERPL-MCNC: 30 MG/DL (ref 40–60)
LDL CHOLESTEROL CALCULATED: 47 MG/DL
POTASSIUM SERPL-SCNC: 4.3 MMOL/L (ref 3.5–5.1)
SODIUM BLD-SCNC: 140 MMOL/L (ref 136–145)
TOTAL PROTEIN: 6.9 G/DL (ref 6.4–8.2)
TRIGL SERPL-MCNC: 159 MG/DL (ref 0–150)
VLDLC SERPL CALC-MCNC: 32 MG/DL

## 2022-11-05 LAB
ESTIMATED AVERAGE GLUCOSE: 148.5 MG/DL
HBA1C MFR BLD: 6.8 %

## 2022-11-07 ENCOUNTER — OFFICE VISIT (OUTPATIENT)
Dept: INTERNAL MEDICINE CLINIC | Age: 62
End: 2022-11-07
Payer: COMMERCIAL

## 2022-11-07 VITALS
DIASTOLIC BLOOD PRESSURE: 78 MMHG | HEART RATE: 98 BPM | WEIGHT: 144 LBS | BODY MASS INDEX: 26.34 KG/M2 | SYSTOLIC BLOOD PRESSURE: 148 MMHG | OXYGEN SATURATION: 97 %

## 2022-11-07 DIAGNOSIS — E11.9 TYPE 2 DIABETES MELLITUS WITHOUT COMPLICATION, WITHOUT LONG-TERM CURRENT USE OF INSULIN (HCC): ICD-10-CM

## 2022-11-07 DIAGNOSIS — I10 ESSENTIAL HYPERTENSION: ICD-10-CM
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
## 2022-11-07 DIAGNOSIS — F41.9 ANXIETY: Primary | ICD-10-CM

## 2022-11-07 DIAGNOSIS — M81.0 AGE-RELATED OSTEOPOROSIS WITHOUT CURRENT PATHOLOGICAL FRACTURE: ICD-10-CM

## 2022-11-07 DIAGNOSIS — Z85.3 HISTORY OF BREAST CANCER: ICD-10-CM

## 2022-11-07 DIAGNOSIS — E11.65 TYPE 2 DIABETES MELLITUS WITH HYPERGLYCEMIA, WITHOUT LONG-TERM CURRENT USE OF INSULIN (HCC): ICD-10-CM

## 2022-11-07 DIAGNOSIS — E78.5 HYPERLIPIDEMIA LDL GOAL <70: ICD-10-CM

## 2022-11-07 LAB
CREATININE URINE: 45.5 MG/DL (ref 28–259)
MICROALBUMIN UR-MCNC: 1.4 MG/DL
MICROALBUMIN/CREAT UR-RTO: 30.8 MG/G (ref 0–30)

## 2022-11-07 PROCEDURE — 90715 TDAP VACCINE 7 YRS/> IM: CPT | Performed by: INTERNAL MEDICINE

## 2022-11-07 PROCEDURE — 99214 OFFICE O/P EST MOD 30 MIN: CPT | Performed by: INTERNAL MEDICINE

## 2022-11-07 PROCEDURE — 3078F DIAST BP <80 MM HG: CPT | Performed by: INTERNAL MEDICINE

## 2022-11-07 PROCEDURE — 3074F SYST BP LT 130 MM HG: CPT | Performed by: INTERNAL MEDICINE

## 2022-11-07 PROCEDURE — G8427 DOCREV CUR MEDS BY ELIG CLIN: HCPCS | Performed by: INTERNAL MEDICINE

## 2022-11-07 PROCEDURE — 1036F TOBACCO NON-USER: CPT | Performed by: INTERNAL MEDICINE

## 2022-11-07 PROCEDURE — G8482 FLU IMMUNIZE ORDER/ADMIN: HCPCS | Performed by: INTERNAL MEDICINE

## 2022-11-07 PROCEDURE — 3044F HG A1C LEVEL LT 7.0%: CPT | Performed by: INTERNAL MEDICINE

## 2022-11-07 PROCEDURE — 90471 IMMUNIZATION ADMIN: CPT | Performed by: INTERNAL MEDICINE

## 2022-11-07 PROCEDURE — 2022F DILAT RTA XM EVC RTNOPTHY: CPT | Performed by: INTERNAL MEDICINE

## 2022-11-07 PROCEDURE — G8419 CALC BMI OUT NRM PARAM NOF/U: HCPCS | Performed by: INTERNAL MEDICINE

## 2022-11-07 PROCEDURE — 3017F COLORECTAL CA SCREEN DOC REV: CPT | Performed by: INTERNAL MEDICINE

## 2022-11-07 RX ORDER — DULAGLUTIDE 1.5 MG/.5ML
1.5 INJECTION, SOLUTION SUBCUTANEOUS WEEKLY
Qty: 4 ADJUSTABLE DOSE PRE-FILLED PEN SYRINGE | Refills: 3 | Status: SHIPPED | OUTPATIENT
Start: 2022-11-07

## 2022-11-07 RX ORDER — PEN NEEDLE, DIABETIC 30 GX5/16"
1 NEEDLE, DISPOSABLE MISCELLANEOUS DAILY
Qty: 100 EACH | Refills: 3 | Status: SHIPPED | OUTPATIENT
Start: 2022-11-07

## 2022-11-07 RX ORDER — MUPIROCIN CALCIUM 20 MG/G
CREAM TOPICAL
Qty: 30 G | Refills: 1 | Status: SHIPPED | OUTPATIENT
Start: 2022-11-07 | End: 2022-12-07

## 2022-11-07 RX ORDER — MUPIROCIN CALCIUM 20 MG/G
CREAM TOPICAL
Qty: 30 G | Refills: 1 | Status: SHIPPED | OUTPATIENT
Start: 2022-11-07 | End: 2022-11-07

## 2022-11-07 RX ORDER — GLIPIZIDE 5 MG/1
5 TABLET ORAL
Qty: 180 TABLET | Refills: 3 | Status: SHIPPED | OUTPATIENT
Start: 2022-11-07

## 2022-11-07 RX ORDER — INSULIN GLARGINE 100 [IU]/ML
60 INJECTION, SOLUTION SUBCUTANEOUS NIGHTLY
Qty: 15 ML | Refills: 5 | Status: SHIPPED | OUTPATIENT
Start: 2022-11-07

## 2022-11-07 ASSESSMENT — ENCOUNTER SYMPTOMS
CHEST TIGHTNESS: 0
DIARRHEA: 0
SHORTNESS OF BREATH: 0
CONSTIPATION: 0

## 2022-11-07 NOTE — PROGRESS NOTES
Patient: Theresa Hoyos is a 57 y.o. female who presents today with the following Chief Complaint(s):  Chief Complaint   Patient presents with    Check-Up       HPI    Here today for follow up. H/o breast cancer- recently told that she needs to stay on her Arimidex for 5 more years. DM- no issues with low blood sugars. Has not been testing her sugars. Doing well with Trulicity. Does have occasional GI upset but not often. HTN- no issues with Ziac. Does not check her blood pressure. Anxious \"all of the time\". Results for orders placed or performed in visit on 11/07/22   Microalbumin / Creatinine Urine Ratio   Result Value Ref Range    Microalbumin, Random Urine 1.40 <2.0 mg/dL    Creatinine, Ur 45.5 28.0 - 259.0 mg/dL    Microalbumin Creatinine Ratio 30.8 (H) 0.0 - 30.0 mg/g      Hemoglobin A1C   Date Value Ref Range Status   11/04/2022 6.8 See comment % Final     Comment:     Comment:  Diagnosis of Diabetes: > or = 6.5%  Increased risk of diabetes (Prediabetes): 5.7-6.4%  Glycemic Control: Nonpregnant Adults: <7.0%                    Pregnant: <6.0%         Lab Results   Component Value Date    LABA1C 6.8 11/04/2022    LABA1C 7.3 06/06/2022    LABA1C 7.6 03/04/2022     Lab Results   Component Value Date    LABMICR 1.40 11/07/2022    LDLCALC 47 11/04/2022    CREATININE 0.7 11/04/2022         Allergies   Allergen Reactions    Cefdinir Other (See Comments)     *Cephalosporins*: Pruritus. Erythromycin Swelling     Throat swelling. Ezetimibe-Simvastatin Other (See Comments)     Muscle cramps. Lisinopril Other (See Comments)     Cough. Niacin And Related Other (See Comments)     Niacin (antihyperlipidemic) *Antihyperlipidmics*: S.E.    Other Other (See Comments)     darvocet    Pcn [Penicillins] Hives    Zithromax [Azithromycin] Swelling     Throat swelling.        Past Medical History:   Diagnosis Date    Allergic rhinitis, cause unspecified     Cancer ([facility] [address]     left breast Generalized osteoarthrosis, involving multiple sites     Pure hypercholesterolemia     Pure hyperglyceridemia     Type II or unspecified type diabetes mellitus without mention of complication, uncontrolled     Unspecified essential hypertension       Past Surgical History:   Procedure Laterality Date    CHOLECYSTECTOMY, LAPAROSCOPIC N/A 12/19/2018    LAPAROSCOPIC CHOLECYSTECTOMY; CHOLANGIOGRAM performed by Vince Carrion MD at [address] [place] [address]      colonoscopy ordered 6/2010; 5/2011; 11/2011, recommended again and refused at this time on 3/30/12 & 4/2012     COLONOSCOPY N/A 11/29/2018    COLONOSCOPY POLYPECTOMY SNARE/COLD BIOPSY performed by Wilmer Morin MD at [facility] 04/18/2019    Skin sparing mastectomy Sentinal Lymph node Bx Tissue expander    NASAL SEPTUM SURGERY      TONSILLECTOMY      WRIST SURGERY Right       Social History     Socioeconomic History    Marital status: Single     Spouse name: Not on file    Number of children: Not on file    Years of education: Not on file    Highest education level: Not on file   Occupational History    Not on file   Tobacco Use    Smoking status: Never    Smokeless tobacco: Never   Vaping Use    Vaping Use: Never used   Substance and Sexual Activity    Alcohol use: No     Alcohol/week: 0.0 standard drinks    Drug use: No    Sexual activity: Not on file   Other Topics Concern    Not on file   Social History Narrative    Not on file     Social Determinants of Health     Financial Resource Strain: Not on file   Food Insecurity: Not on file   Transportation Needs: Not on file   Physical Activity: Not on file   Stress: Not on file   Social Connections: Not on file   Intimate Partner Violence: Not on file   Housing Stability: Not on file     Family History   Problem Relation Age of Onset    Asthma Maternal Grandmother     Hypertension Maternal Grandmother     Breast Cancer Maternal Grandmother 76    Heart Disease Maternal Grandfather Hypertension Paternal Grandmother     Heart Disease Paternal Grandfather     Hypertension Paternal Grandfather     COPD Father         Outpatient Medications Prior to Visit   Medication Sig Dispense Refill    loratadine (CLARITIN) 10 MG tablet TAKE ONE TABLET BY MOUTH DAILY 30 tablet 3    Azelastine HCl 137 MCG/SPRAY SOLN SPRAY 2 SPRAYS IN EACH NOSTRIL DAILY 30 mL 5    rosuvastatin (CRESTOR) 10 MG tablet TAKE ONE TABLET BY MOUTH DAILY 90 tablet 1    montelukast (SINGULAIR) 10 MG tablet TAKE ONE TABLET BY MOUTH ONCE NIGHTLY AS NEEDED FOR ALLERGIES 30 tablet 5    blood glucose monitor strips Test 2 times a day & as needed for symptoms of irregular blood glucose 100 strip 5    blood glucose monitor kit and supplies Check sugars fasting and 2 hours after largest meal. 1 kit 0    Lancets MISC 1 each by Does not apply route 2 times daily 100 each 5    bisoprolol-hydroCHLOROthiazide (ZIAC) 5-6.25 MG per tablet Take 1 tablet by mouth daily 30 tablet 3    omeprazole (PRILOSEC) 40 MG delayed release capsule TAKE ONE CAPSULE BY MOUTH DAILY 30 capsule 5    JARDIANCE 25 MG tablet TAKE ONE TABLET BY MOUTH DAILY 30 tablet 5    fluticasone (FLONASE) 50 MCG/ACT nasal spray SPRAY TWO SPRAYS IN EACH NOSTRIL ONCE DAILY 3 each 3    valsartan (DIOVAN) 320 MG tablet TAKE ONE TABLET BY MOUTH DAILY 17 tablet 3    sodium chloride (OCEAN, BABY AYR) 0.65 % nasal spray 1 spray by Nasal route as needed for Congestion      blood glucose monitor kit and supplies Test BID times a day & as needed for symptoms of irregular blood glucose. 1 kit 0    blood glucose monitor strips Test BID  times a day & as needed for symptoms of irregular blood glucose.  100 strip 5    vitamin C (ASCORBIC ACID) 500 MG tablet Take 500 mg by mouth daily      CALCIUM PO Take by mouth 2 times daily      Denosumab (PROLIA SC) Inject into the skin every 6 months      anastrozole (ARIMIDEX) 1 MG tablet Take 1 mg by mouth nightly      Simethicone 180 MG CAPS Take by mouth 2 times daily as needed      Cholecalciferol (VITAMIN D3) 2000 units CAPS Take by mouth daily       Probiotic Product (PROBIOTIC DAILY PO) Take by mouth daily       Dulaglutide (TRULICITY) 1.5 ZY/7.9PG SOPN INJECT 1.5 MG UNDER THE SKIN ONCE WEEKLY 2 mL 5    [employer] PEN NEEDLES 31G X 6 MM MISC USE ONE - DAILY 100 each 3    LANTUS SOLOSTAR 100 UNIT/ML injection pen INJECT 60 UNITS UNDER THE SKIN ONCE NIGHTLY 15 mL 1    glipiZIDE (GLUCOTROL) 5 MG tablet TAKE ONE TABLET BY MOUTH TWICE A DAY BEFORE A MEAL 60 tablet 5    metFORMIN (GLUCOPHAGE) 850 MG tablet TAKE ONE TABLET BY MOUTH TWICE A DAY WITH A MEAL 180 tablet 3     No facility-administered medications prior to visit. Patient'spast medical history, surgical history, family history, medications,  and allergies  were all reviewed and updated as appropriate today. Review of Systems   Constitutional:  Negative for appetite change, fatigue and fever. Respiratory:  Negative for chest tightness and shortness of breath. Cardiovascular:  Negative for chest pain. Gastrointestinal:  Negative for constipation and diarrhea. Skin:  Negative for rash. BP (!) 148/78   Pulse 98   Wt 144 lb (65.3 kg)   SpO2 97%   BMI 26.34 kg/m²   Physical Exam  Vitals and nursing note reviewed. Constitutional:       Appearance: She is well-developed. She is not toxic-appearing. HENT:      Head: Normocephalic. Right Ear: Tympanic membrane, ear canal and external ear normal.      Left Ear: Tympanic membrane, ear canal and external ear normal.      Mouth/Throat:      Pharynx: No oropharyngeal exudate or posterior oropharyngeal erythema. Eyes:      General: No scleral icterus. Extraocular Movements: Extraocular movements intact. Conjunctiva/sclera: Conjunctivae normal.      Pupils: Pupils are equal, round, and reactive to light. Neck:      Thyroid: No thyroid mass or thyromegaly. Vascular: No carotid bruit.    Cardiovascular:      Rate and Rhythm: Normal rate and regular rhythm. Heart sounds: Normal heart sounds. No murmur heard. Pulmonary:      Effort: Pulmonary effort is normal.      Breath sounds: Normal breath sounds. Musculoskeletal:      Right lower leg: No edema. Left lower leg: No edema. Lymphadenopathy:      Cervical: No cervical adenopathy. Neurological:      General: No focal deficit present. Mental Status: She is alert and oriented to person, place, and time. Psychiatric:         Mood and Affect: Mood normal.         Behavior: Behavior normal. Behavior is cooperative. ASSESSMENT/PLAN:    Problem List Items Addressed This Visit       Age-related osteoporosis without current pathological fracture     Receiving Prolia injections from oncology. Anxiety - Primary      Long discussion with patient regarding treatment of anxiety. Discussed counseling versus medication. Patient does not want to commit to treatment but will think about options discussed today. Cat scratch      Add Bactroban. Relevant Medications    mupirocin (BACTROBAN) 2 % cream    Essential hypertension     Elevated today but patient is extremely anxious. Advised patient to monitor blood pressure at home. Continue Ziac 5/6.25 mg daily and valsartan 320 mg daily. History of breast cancer     Continues to follow with Dr. Smith Lopez for breast exams and mammograms and oncology twice yearly. Remains on Arimidex. Hyperlipidemia LDL goal <70     Well-controlled on Crestor 10 mg daily. Continue. Relevant Orders    Comprehensive Metabolic Panel    Lipid Panel    Type 2 diabetes mellitus without complication, without long-term current use of insulin ([facility])     Most recent hemoglobin A1c 6.8%. Continue Trulicity 1.5 mg weekly, Lantus 60 units nightly, Jardiance 25 mg daily, and metformin 850 mg twice daily.          Relevant Medications    dulaglutide (TRULICITY) 1.5 SAMUEL/5.2NL SC injection    insulin glargine (LANTUS SOLOSTAR) 100 UNIT/ML injection pen    glipiZIDE (GLUCOTROL) 5 MG tablet    metFORMIN (GLUCOPHAGE) 850 MG tablet    Other Relevant Orders    Comprehensive Metabolic Panel    Hemoglobin A1C    HM DIABETES FOOT EXAM (Completed)       Current Outpatient Medications   Medication Sig Dispense Refill    mupirocin (BACTROBAN) 2 % cream Apply 3 times daily.  30 g 1    dulaglutide (TRULICITY) 1.5 PW/0.0BZ SC injection Inject 0.5 mLs into the skin once a week INJECT 1.5 MG UNDER THE SKIN ONCE WEEKLY 4 Adjustable Dose Pre-filled Pen Syringe 3    insulin glargine (LANTUS SOLOSTAR) 100 UNIT/ML injection pen Inject 60 Units into the skin nightly 15 mL 5    glipiZIDE (GLUCOTROL) 5 MG tablet Take 1 tablet by mouth 2 times daily (before meals) 180 tablet 3    metFORMIN (GLUCOPHAGE) 850 MG tablet Take 1 tablet by mouth 2 times daily (with meals) 180 tablet 3    Insulin Pen Needle (PEN NEEDLES 3/16\") 31G X 5 MM MISC 1 each by Does not apply route daily 100 each 3    loratadine (CLARITIN) 10 MG tablet TAKE ONE TABLET BY MOUTH DAILY 30 tablet 3    Azelastine HCl 137 MCG/SPRAY SOLN SPRAY 2 SPRAYS IN EACH NOSTRIL DAILY 30 mL 5    rosuvastatin (CRESTOR) 10 MG tablet TAKE ONE TABLET BY MOUTH DAILY 90 tablet 1    montelukast (SINGULAIR) 10 MG tablet TAKE ONE TABLET BY MOUTH ONCE NIGHTLY AS NEEDED FOR ALLERGIES 30 tablet 5    blood glucose monitor strips Test 2 times a day & as needed for symptoms of irregular blood glucose 100 strip 5    blood glucose monitor kit and supplies Check sugars fasting and 2 hours after largest meal. 1 kit 0    Lancets MISC 1 each by Does not apply route 2 times daily 100 each 5    bisoprolol-hydroCHLOROthiazide (ZIAC) 5-6.25 MG per tablet Take 1 tablet by mouth daily 30 tablet 3    JARDIANCE 25 MG tablet TAKE ONE TABLET BY MOUTH DAILY 30 tablet 5    fluticasone (FLONASE) 50 MCG/ACT nasal spray SPRAY TWO SPRAYS IN EACH NOSTRIL ONCE DAILY 3 each 3    valsartan (DIOVAN) 320 MG tablet TAKE ONE TABLET BY MOUTH DAILY 17 tablet 3 sodium chloride (OCEAN, BABY AYR) 0.65 % nasal spray 1 spray by Nasal route as needed for Congestion      blood glucose monitor kit and supplies Test BID times a day & as needed for symptoms of irregular blood glucose. 1 kit 0    blood glucose monitor strips Test BID  times a day & as needed for symptoms of irregular blood glucose. 100 strip 5    vitamin C (ASCORBIC ACID) 500 MG tablet Take 500 mg by mouth daily      CALCIUM PO Take by mouth 2 times daily      Denosumab (PROLIA SC) Inject into the skin every 6 months      anastrozole (ARIMIDEX) 1 MG tablet Take 1 mg by mouth nightly      Simethicone 180 MG CAPS Take by mouth 2 times daily as needed      Cholecalciferol (VITAMIN D3) 2000 units CAPS Take by mouth daily       Probiotic Product (PROBIOTIC DAILY PO) Take by mouth daily       omeprazole (PRILOSEC) 40 MG delayed release capsule TAKE 1 CAPSULE BY MOUTH EVERY DAY 30 capsule 5     No current facility-administered medications for this visit. Return in about 4 months (around 3/7/2023) for pap w/labs; 6 weeks on blood pressure.

## 2022-11-07 NOTE — PATIENT INSTRUCTIONS
Please check you blood pressure 2-3 times per week at different times of the day. Please bring the readings and your blood pressure cuff with you to your next appointment. Goal blood pressure is under 135/85, ideal is in the 120's/80's and below. Try journaling- have 2 different journals. 1 for negative/anxoius/worrying thoughts and 1 for positive thoughts. Set a timer and spend 15 minutes each day writing in each journal. Write in the \"bad\" journal first and spend NO MORE than 15 minutes writing in here. Once you are done with the journal, you are done for the day. The journal will keep your negative thoughts safe until you can return to them the following day. You should then spend AT LEAST 15 minutes in the \"good\" journal and try to write down at least 3 good/positive things. The more, the better. You may visit this journal at any time. Try creating a \"worry box\" where you write down what is causing your anxiety on scraps of paper. Keep the scraps of paper in the box. This will also keep your worries safe so that you do not need to carry them with you. When you are feeling overwhelmed by your anxiety, please try counting backwards from 21 by 3's. You may need to do this a few times for it to work but is should help to shut the panic/anxiety down in your brain. Please have your labs drawn about 1 week prior to your next appointment in March . You may get your labs drawn in our office, Monday-Friday from 7:30 am-4:00 pm. You do not need an appointment. If this does not work for you, you may also have your labs drawn at [facility] earlier during the week or on weekends. If you prefer to have your labs draw elsewhere ([facility]), please allow a little more time for me to get your results and please call the office ahead of your appointment so that we may make sure that we have your labs at the time of your appointment.  We sometimes need to call the lab directly (when not done at a  [facility] lab) to get your results. Your labs are fasting.    fasting (nothing to eat w/in 8 hours of the lab test, is ok to drink water or black coffee)

## 2022-11-11 RX ORDER — OMEPRAZOLE 40 MG/1
CAPSULE, DELAYED RELEASE ORAL
Qty: 30 CAPSULE | Refills: 5 | Status: SHIPPED | OUTPATIENT
Start: 2022-11-11

## 2022-11-11 RX ORDER — INSULIN GLARGINE 100 [IU]/ML
INJECTION, SOLUTION SUBCUTANEOUS
Qty: 15 ML | Refills: 5 | OUTPATIENT
Start: 2022-11-11

## 2022-11-11 NOTE — TELEPHONE ENCOUNTER
Future Appointments    Encounter Information    Provider Department Appt Notes   3/7/2023 Dank Fulton, [address] [facility] 4 months for pap labs prior     Past Visits    Date Provider Specialty Visit Type Primary Dx   11/07/2022 Dank Fulton DO Internal Medicine Office Visit Age-related osteoporosis without current pathological fracture

## 2022-11-14 NOTE — ASSESSMENT & PLAN NOTE
Most recent hemoglobin A1c 6.8%. Continue Trulicity 1.5 mg weekly, Lantus 60 units nightly, Jardiance 25 mg daily, and metformin 850 mg twice daily.

## 2022-11-14 NOTE — ASSESSMENT & PLAN NOTE
Continues to follow with Dr. Earlene Zeng for breast exams and mammograms and oncology twice yearly. Remains on Arimidex.

## 2022-11-14 NOTE — ASSESSMENT & PLAN NOTE
Long discussion with patient regarding treatment of anxiety. Discussed counseling versus medication. Patient does not want to commit to treatment but will think about options discussed today.

## 2022-11-16 ENCOUNTER — TELEPHONE (OUTPATIENT)
Dept: INTERNAL MEDICINE CLINIC | Age: 62
End: 2022-11-16

## 2022-11-22 RX ORDER — ROSUVASTATIN CALCIUM 10 MG/1
TABLET, COATED ORAL
Qty: 90 TABLET | Refills: 1 | Status: SHIPPED | OUTPATIENT
Start: 2022-11-22

## 2022-11-22 NOTE — TELEPHONE ENCOUNTER
Future Appointments    Encounter Information    Provider Department Appt Notes   12/19/2022 SCHEDULE, [facility] 6 weeks on blood pressure.    3/7/2023 Melita King, [address] 4 months for pap labs prior     Past Visits    Date Provider Specialty Visit Type Primary Dx   11/07/2022 Melita King DO Internal Medicine Office Visit Anxiety   06/06/2022 Melita King [place] [facility] Office Visit Type 2 diabetes mellitus with hyperglycemia, without long-term current use of insulin ([facility] [address]

## 2022-12-12 DIAGNOSIS — J30.89 SEASONAL ALLERGIC RHINITIS DUE TO OTHER ALLERGIC TRIGGER: ICD-10-CM

## 2022-12-12 DIAGNOSIS — I10 ESSENTIAL HYPERTENSION: ICD-10-CM

## 2022-12-12 DIAGNOSIS — R05.9 COUGH: ICD-10-CM

## 2022-12-12 DIAGNOSIS — R09.81 NASAL CONGESTION: ICD-10-CM

## 2022-12-12 DIAGNOSIS — R06.7 SNEEZING: ICD-10-CM

## 2022-12-13 RX ORDER — LORATADINE 10 MG/1
TABLET ORAL
Qty: 90 TABLET | Refills: 3 | Status: SHIPPED | OUTPATIENT
Start: 2022-12-13

## 2022-12-13 RX ORDER — VALSARTAN 320 MG/1
TABLET ORAL
Qty: 90 TABLET | Refills: 3 | Status: SHIPPED | OUTPATIENT
Start: 2022-12-13

## 2022-12-19 ENCOUNTER — NURSE ONLY (OUTPATIENT)
Dept: INTERNAL MEDICINE CLINIC | Age: 62
End: 2022-12-19

## 2022-12-19 VITALS — SYSTOLIC BLOOD PRESSURE: 130 MMHG | DIASTOLIC BLOOD PRESSURE: 58 MMHG

## 2022-12-19 DIAGNOSIS — I10 ESSENTIAL HYPERTENSION: Primary | ICD-10-CM

## 2022-12-19 RX ORDER — MONTELUKAST SODIUM 10 MG/1
TABLET ORAL
Qty: 30 TABLET | Refills: 5 | OUTPATIENT
Start: 2022-12-19

## 2022-12-20 RX ORDER — MONTELUKAST SODIUM 10 MG/1
TABLET ORAL
Qty: 30 TABLET | Refills: 5 | Status: SHIPPED | OUTPATIENT
Start: 2022-12-20

## 2023-01-10 ENCOUNTER — TELEPHONE (OUTPATIENT)
Dept: INTERNAL MEDICINE CLINIC | Age: 63
End: 2023-01-10

## 2023-01-10 NOTE — TELEPHONE ENCOUNTER
Pt calling in regards to being called for [employer]. States that she is unable to do so and is requesting Dr. Zackary Grajeda to write a letter to excuse her. Has to report this by Friday. Pt is starting to become anxious, takes care of her 80year old mother. With the COVID flare and Flu season she's concerned about illnesses with her history & ongoing illnesses. Letter can be faxed to [phone]. If anything else is needed please call the pt at [phone].

## 2023-01-10 NOTE — TELEPHONE ENCOUNTER
PT calling back to let the office know her juror number is [phone] and master juror number is [phone] and she needs the reason for her not to answer for jury duty to be faxed by tomorrow and reach out   to the PT once this has been faxed over. following up from previous message about Pt calling in regards to being called for [employer]. States that she is unable to do so and is requesting Dr. Gwyn Cuellar to write a letter to excuse her. Fax Number [phone].

## 2023-02-09 ENCOUNTER — APPOINTMENT (OUTPATIENT)
Dept: GENERAL RADIOLOGY | Age: 63
End: 2023-02-09
Payer: COMMERCIAL

## 2023-02-09 ENCOUNTER — HOSPITAL ENCOUNTER (EMERGENCY)
Age: 63
Discharge: HOME | End: 2023-02-09
Attending: EMERGENCY MEDICINE
Payer: COMMERCIAL

## 2023-02-09 VITALS
WEIGHT: 145 LBS | HEART RATE: 97 BPM | SYSTOLIC BLOOD PRESSURE: 147 MMHG | OXYGEN SATURATION: 95 % | BODY MASS INDEX: 26.52 KG/M2 | TEMPERATURE: 97.9 F | DIASTOLIC BLOOD PRESSURE: 75 MMHG | RESPIRATION RATE: 19 BRPM

## 2023-02-09 DIAGNOSIS — S92.422B DISPLACED FRACTURE OF DISTAL PHALANX OF LEFT GREAT TOE, INITIAL ENCOUNTER FOR OPEN FRACTURE: ICD-10-CM

## 2023-02-09 DIAGNOSIS — S62.102A LEFT WRIST FRACTURE, CLOSED, INITIAL ENCOUNTER: Primary | ICD-10-CM

## 2023-02-09 DIAGNOSIS — S92.424A NONDISPLACED FRACTURE OF DISTAL PHALANX OF RIGHT GREAT TOE, INITIAL ENCOUNTER FOR CLOSED FRACTURE: ICD-10-CM

## 2023-02-09 DIAGNOSIS — S92.502A CLOSED FRACTURE OF PHALANX OF LEFT SECOND TOE, INITIAL ENCOUNTER: ICD-10-CM

## 2023-02-09 PROCEDURE — 73562 X-RAY EXAM OF KNEE 3: CPT

## 2023-02-09 PROCEDURE — 6370000000 HC RX 637 (ALT 250 FOR IP): Performed by: EMERGENCY MEDICINE

## 2023-02-09 PROCEDURE — 99283 EMERGENCY DEPT VISIT LOW MDM: CPT

## 2023-02-09 PROCEDURE — 73630 X-RAY EXAM OF FOOT: CPT

## 2023-02-09 PROCEDURE — 73110 X-RAY EXAM OF WRIST: CPT

## 2023-02-09 RX ORDER — CLINDAMYCIN HYDROCHLORIDE 150 MG/1
300 CAPSULE ORAL ONCE
Status: COMPLETED | OUTPATIENT
Start: 2023-02-09 | End: 2023-02-09

## 2023-02-09 RX ORDER — HYDROCODONE BITARTRATE AND ACETAMINOPHEN 5; 325 MG/1; MG/1
1 TABLET ORAL EVERY 8 HOURS PRN
Qty: 15 TABLET | Refills: 0 | Status: SHIPPED | OUTPATIENT
Start: 2023-02-09 | End: 2023-02-12

## 2023-02-09 RX ORDER — HYDROCODONE BITARTRATE AND ACETAMINOPHEN 5; 325 MG/1; MG/1
1 TABLET ORAL EVERY 8 HOURS PRN
Qty: 15 TABLET | Refills: 0 | Status: SHIPPED | OUTPATIENT
Start: 2023-02-09 | End: 2023-02-09 | Stop reason: SDUPTHER

## 2023-02-09 RX ORDER — HYDROCODONE BITARTRATE AND ACETAMINOPHEN 5; 325 MG/1; MG/1
2 TABLET ORAL ONCE
Status: COMPLETED | OUTPATIENT
Start: 2023-02-09 | End: 2023-02-09

## 2023-02-09 RX ORDER — BUPIVACAINE HYDROCHLORIDE 2.5 MG/ML
2 INJECTION, SOLUTION EPIDURAL; INFILTRATION; INTRACAUDAL ONCE
Status: DISCONTINUED | OUTPATIENT
Start: 2023-02-09 | End: 2023-02-09 | Stop reason: HOSPADM

## 2023-02-09 RX ORDER — CLINDAMYCIN HYDROCHLORIDE 300 MG/1
300 CAPSULE ORAL 3 TIMES DAILY
Qty: 21 CAPSULE | Refills: 0 | Status: SHIPPED | OUTPATIENT
Start: 2023-02-09 | End: 2023-02-09 | Stop reason: SDUPTHER

## 2023-02-09 RX ORDER — CLINDAMYCIN HYDROCHLORIDE 300 MG/1
300 CAPSULE ORAL 3 TIMES DAILY
Qty: 21 CAPSULE | Refills: 0 | Status: SHIPPED | OUTPATIENT
Start: 2023-02-09 | End: 2023-02-16

## 2023-02-09 RX ADMIN — HYDROCODONE BITARTRATE AND ACETAMINOPHEN 2 TABLET: 5; 325 TABLET ORAL at 18:24

## 2023-02-09 RX ADMIN — CLINDAMYCIN HYDROCHLORIDE 300 MG: 150 CAPSULE ORAL at 20:51

## 2023-02-09 ASSESSMENT — PAIN DESCRIPTION - ORIENTATION: ORIENTATION: LEFT

## 2023-02-09 ASSESSMENT — PAIN DESCRIPTION - LOCATION: LOCATION: WRIST;TOE (COMMENT WHICH ONE)

## 2023-02-09 ASSESSMENT — PAIN SCALES - GENERAL: PAINLEVEL_OUTOF10: 5

## 2023-02-09 NOTE — ED PROVIDER NOTES
[facility] Emergency Department      Pt Name: Karolyn Fontaine  MRN: [id number]  [place] 1960  Date of evaluation: 2/9/2023  Provider: Savanah Dooley MD  CHIEF COMPLAINT  Chief Complaint   Patient presents with    Fall     Pt states she fell and hit head by tripping. Denies LOC States she hurt her left wrist in the fall. Also left toe injury. Blood under toenail noted. States pool cover got up under her toe, currently wrapped in a towel. HPI  Karolyn Fontaine is a 60 y.o. female who presents because of a fall. She says that she tripped over one of the ties for the pool cover. She was wearing open toed shoes. Despite the nursing note that says that she hit her head, she denies any head injury at all. She is pretty sure she did not hit her head and there is no evidence for injury noted. She had no loss of consciousness. She denies any neck or back pain. She does have pain to her left wrist and her left toe. Her mother wrapped her foot up because of the bleeding. She also noted some pain to her right knee. Injury happened prior to arrival.  Tetanus is UTD. REVIEW OF SYSTEMS:  LOC absent, no breathing difficulty, no abdominal pain Pertinent positives and negatives as per the HPI. All other pertinent review of systems reviewed and negative. Nursing notes reviewed.     PAST MEDICAL HISTORY  Past Medical History:   Diagnosis Date    Allergic rhinitis, cause unspecified     Cancer ([facility] [address]     left breast    Generalized osteoarthrosis, involving multiple sites     Pure hypercholesterolemia     Pure hyperglyceridemia     Type II or unspecified type diabetes mellitus without mention of complication, uncontrolled     Unspecified essential hypertension      SURGICAL HISTORY  Past Surgical History:   Procedure Laterality Date    CHOLECYSTECTOMY, LAPAROSCOPIC N/A 12/19/2018    LAPAROSCOPIC CHOLECYSTECTOMY; CHOLANG[facility]GRAM [facility]      colonoscopy ordered 6/2010; 5/2011; 11/2011, recommended again and refused at this time on 3/30/12 & 4/2012     COLONOSCOPY N/A 11/29/2018    COLONOSCOPY POLYPECTOMY SNARE/COLD BIOPSY performed by Anali Casillas MD at [facility] Left 04/18/2019    Skin sparing mastectomy Sentinal Lymph node Bx Tissue expander    NASAL SEPTUM SURGERY      TONSILLECTOMY      WRIST SURGERY Right      MEDICATIONS:  No current facility-administered medications on file prior to encounter.      Current Outpatient Medications on File Prior to Encounter   Medication Sig Dispense Refill    montelukast (SINGULAIR) 10 MG tablet TAKE 1 TABLET BY MOUTH EVERY NIGHT AS NEEDED FOR ALLERGIES 30 tablet 5    valsartan (DIOVAN) 320 MG tablet TAKE 1 TABLET BY MOUTH EVERY DAY 90 tablet 3    loratadine (CLARITIN) 10 MG tablet TAKE 1 TABLET BY MOUTH EVERY DAY 90 tablet 3    rosuvastatin (CRESTOR) 10 MG tablet TAKE ONE TABLET BY MOUTH DAILY 90 tablet 1    omeprazole (PRILOSEC) 40 MG delayed release capsule TAKE 1 CAPSULE BY MOUTH EVERY DAY 30 capsule 5    dulaglutide (TRULICITY) 1.5 JE/3.9LL SC injection Inject 0.5 mLs into the skin once a week INJECT 1.5 MG UNDER THE SKIN ONCE WEEKLY 4 Adjustable Dose Pre-filled Pen Syringe 3    insulin glargine (LANTUS SOLOSTAR) 100 UNIT/ML injection pen Inject 60 Units into the skin nightly 15 mL 5    glipiZIDE (GLUCOTROL) 5 MG tablet Take 1 tablet by mouth 2 times daily (before meals) 180 tablet 3    metFORMIN (GLUCOPHAGE) 850 MG tablet Take 1 tablet by mouth 2 times daily (with meals) 180 tablet 3    Insulin Pen Needle (PEN NEEDLES 3/16\") 31G X 5 MM MISC 1 each by Does not apply route daily 100 each 3    Azelastine HCl 137 MCG/SPRAY SOLN SPRAY 2 SPRAYS IN EACH NOSTRIL DAILY 30 mL 5    blood glucose monitor strips Test 2 times a day & as needed for symptoms of irregular blood glucose 100 strip 5    blood glucose monitor kit and supplies Check sugars fasting and 2 hours after largest meal. 1 kit 0    Lancets MISC 1 each by Does not apply route 2 times daily 100 each 5    bisoprolol-hydroCHLOROthiazide (ZIAC) 5-6.25 MG per tablet Take 1 tablet by mouth daily 30 tablet 3    JARDIANCE 25 MG tablet TAKE ONE TABLET BY MOUTH DAILY 30 tablet 5    fluticasone (FLONASE) 50 MCG/ACT nasal spray SPRAY TWO SPRAYS IN EACH NOSTRIL ONCE DAILY 3 each 3    sodium chloride (OCEAN, BABY AYR) 0.65 % nasal spray 1 spray by Nasal route as needed for Congestion      blood glucose monitor kit and supplies Test BID times a day & as needed for symptoms of irregular blood glucose. 1 kit 0    blood glucose monitor strips Test BID  times a day & as needed for symptoms of irregular blood glucose.  100 strip 5    vitamin C (ASCORBIC ACID) 500 MG tablet Take 500 mg by mouth daily      CALCIUM PO Take by mouth 2 times daily      Denosumab (PROLIA SC) Inject into the skin every 6 months      anastrozole (ARIMIDEX) 1 MG tablet Take 1 mg by mouth nightly      Simethicone 180 MG CAPS Take by mouth 2 times daily as needed      Cholecalciferol (VITAMIN D3) 2000 units CAPS Take by mouth daily       Probiotic Product (PROBIOTIC DAILY PO) Take by mouth daily        ALLERGIES  Cefdinir, Erythromycin, Ezetimibe-simvastatin, Lisinopril, Niacin and related, Other, Pcn [penicillins], and Zithromax [azithromycin]  FAMILY HISTORY:  Not relevant    SOCIAL HISTORY:  Social History     Tobacco Use    Smoking status: Never    Smokeless tobacco: Never   Vaping Use    Vaping Use: Never used   Substance Use Topics    Alcohol use: No     Alcohol/week: 0.0 standard drinks    Drug use: No     IMMUNIZATIONS:   Immunization History   Administered Date(s) Administered    COVID-19, MODERNA BLUE border, Primary or Immunocompromised, (age 12y+), IM, 100 mcg/0.5mL 03/05/2021, 04/02/2021, 08/25/2021, 04/02/2022    COVID-19, MODERNA Bivalent BOOSTER, (age 12y+), IM, 50 mcg/0.5 mL 10/04/2022    Hepatitis B Adult (Engerix-B) 10/24/2019, 05/07/2020, 10/24/2020    Hepatitis B Adult (Recombivax HB) 09/04/2019, 10/24/2019, 05/13/2020, 10/24/2020    Influenza A (W9Q4-08) Vaccine IM 12/22/2009    Influenza A (T5P6-10) Vaccine PF IM 12/22/2009    Influenza Vaccine, unspecified formulation 09/23/2013, 09/27/2014, 09/30/2015, 09/25/2016, 09/11/2017, 10/31/2018    Influenza Virus Vaccine 10/31/2008, 09/25/2009, 10/01/2011, 09/25/2012, 09/27/2014, 10/31/2018    Influenza Whole 10/31/2008, 09/25/2009, 09/23/2010, 10/01/2011, 09/25/2012    Influenza, AFLURIA (age 3 yrs+), FLUZONE, (age 6 mo+), MDV, 0.5mL 09/30/2015, 09/25/2016, 09/11/2017    Influenza, FLUARIX, FLULAVAL, FLUZONE (age 6 mo+) AND AFLURIA, (age 3 y+), PF, 0.5mL 08/17/2018    Influenza, FLUBLOK, (age 18 y+), PF, 0.5mL 09/04/2019, 09/09/2020, 09/25/2021, 09/27/2022    Pneumococcal Conjugate Vaccine 09/30/2015    Tdap (Boostrix, Adacel) 11/07/2022    Zoster Live (Zostavax) 08/30/2015, 10/30/2015    Zoster Recombinant (Shingrix) 08/01/2018, 09/18/2018, 02/25/2019     PHYSICAL EXAM  VITAL SIGNS:  Blood pressure (!) 147/75, pulse 97, temperature 97.9 °F (36.6 °C), temperature source Oral, resp. rate 19, weight 145 lb (65.8 kg), SpO2 95 %, not currently breastfeeding.  Constitutional:  62 y.o. female   HENT:  Mucous membranes moist, atraumatic  Eyes:   Conjunctiva clear, no icterus  Neck:  Supple, trachea midline, no visible JVD  Cardiovascular:  Regular, no rubs  Thorax & Lungs:  No accessory muscle usage, no crepitation, clear  Abdomen:  Soft, non distended, no tenderness  Back:  No deformity  Genitalia:  Deferred  Rectal:  Deferred  Extremities:  No cyanosis, distally NVI, deformity with tenderness left wrist, chronic deformity right wrist, abrasions to palm of hand, right knee, both feet, sts to both great toes  Skin:  Warm, dry, 2.5 cm laceration through the mid portion of the great toe nailbed, as above  Neurologic:  Alert, no slurred speech, PERRL, coordination of extremities normal without deficit  Psychiatric:  Affect  appropriate    RESULTS / MEDICAL DECISION MAKING:   RADIOLOGY:  Plain x-rays were viewed by me:   XR WRIST LEFT (MIN 3 VIEWS)   Final Result      Comminuted, impacted, intra-articular fracture the distal left radial   metaphysis with some anterior angulation. Radiocarpal alignment remains   intact. XR KNEE RIGHT (3 VIEWS)   Final Result   Moderate osteoarthritic changes medially in the knee and along the   patellofemoral joint with no acute bony abnormality. Moderate suprapatellar effusion      Vascular calcifications posteriorly         XR FOOT LEFT (MIN 3 VIEWS)   Final Result      1. Fracture of the shaft of the proximal phalanx of the left great toe and   oblique intra-articular fracture of the medial base of the distal phalanx of   the left great toe with some dorsal angulation of the proximal phalangeal   fracture and medial displacement of the distal phalangeal fracture fragment. 2.  Transverse nondisplaced fracture at the junction of the base and shaft of   the proximal phalanx of the 2nd toe. 3.  Mild osteoarthritis of the 1st MTP joint. XR FOOT RIGHT (MIN 3 VIEWS)   Final Result      1. Intra-articular fracture of the medial head proximal phalanx of the right   great toe with only minimal displacement. 2.  Mild osteoarthritis of the 2nd MTP joint. ED COURSE:    Medications   HYDROcodone-acetaminophen (NORCO) 5-325 MG per tablet 2 tablet (2 tablets Oral Given 2/9/23 1824)   clindamycin (CLEOCIN) capsule 300 mg (300 mg Oral Given 2/9/23 2051)     History from : Patient  Limitations to history : None  Chronic Conditions:  has a past medical history of Allergic rhinitis, cause unspecified, Cancer ([facility] [address] Generalized osteoarthrosis, involving multiple sites, Pure hypercholesterolemia, Pure hyperglyceridemia, Type II or unspecified type diabetes mellitus without mention of complication, uncontrolled, and Unspecified essential hypertension.   Records Reviewed : None  Disposition Considerations (Tests not ordered but considered, Shared Decision Making, Pt Expectation of Test or Tx.):  MRI not deemed clinically necessary in the ED setting  Discussion with Other Profesionals : Consultant Dr Mushtaq Arrieta:  Digital block:  Patient gave informed consent for procedure. I cleansed the finger with Betadine. I injected 1 mls of 1% plain bupivacaine to the proximal portion of the toe. Pt tolerated the procedure well with reasonable anesthetic result. Toenail removal:  Pt gave permission for removal.  I used blunt dissection with forceps and removed the nail intact. She tolerated procedure well. I used a tournicot for hemostasis. Laceration Nailbed Repair: I prepped and draped the patient's wound in a sterile fashion. I cleansed the wound with normal saline and explored the wound for foreign material, found none. I closed the wound with 6 4-0 vicryl sutures with fair wound approximation. I needed to place one 4-0 nylon suture across the skin outside the nailbed to fully bring together the tissue. The patient tolerated the procedure well. Splint Placement:  The ER technician placed a volar splint to the injury. The patient is comfortable in the wrist splint. The extremity is well aligned and neurovascular status unchanged post procedure. 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 female who presented because of a fall. She has a wrist fracture, bilateral toe fractures. She had an open fracture of her toe which needed toenail removal and repair of nailbed with sutures. I started her on abx for prophylaxis to minimize chance of osteomyelitis. I consulted Ortho, he is in agreement with plan of care. Post op shoes, minimal weight bearing.   No crutches since bilateral toes involved and she has a wrist fracture and chronic deformity of her other wrist.  Clinical findings do not suggest compartment syndrome or neurovascular injury with the fractures. Wrist splinted and advised to not remove the device until follow up visit. Mario MENDEZ Maggiep was given appropriate discharge instructions. Referral to follow up provider. Differential Diagnosis:  Fracture, sprain, concussion, syncope, CVA, other  Discharge Medication List as of 2/9/2023  9:11 PM   Norco #[id number]     FOLLOW UP:    Sarina Clark MD  [address]  [address] [facility] [address]  [facility] [address]  [phone]    Schedule an appointment as soon as possible for a visit       [facility] Emergency Department  [address]  [address] [place] [address]  [phone]  Go to   If symptoms worsen    FINAL IMPRESSION:    1. Left wrist fracture, closed, initial encounter    2. Displaced fracture of distal phalanx of left great toe, initial encounter for open fracture    3. Nondisplaced fracture of distal phalanx of right great toe, initial encounter for closed fracture    4. Closed fracture of phalanx of left second toe, initial encounter        (Please note that I used voice recognition software to generate this note.   Occasionally words are mistranscribed despite my efforts to edit errors.)        Susu Velasquez MD  02/10/23 1219

## 2023-02-14 ENCOUNTER — TELEPHONE (OUTPATIENT)
Dept: ORTHOPEDIC SURGERY | Age: 63
End: 2023-02-14

## 2023-02-14 NOTE — TELEPHONE ENCOUNTER
Called patient and her mother. Went over medication list in detail and advised what to take the day before and what insulin to adjust. Provided a surgery time for Thursday as 9:15 AM and report time of 7:15 AM. Explained that they will receive follow up phone calls from the hospital tomorrow.

## 2023-02-14 NOTE — TELEPHONE ENCOUNTER
Auth: [employer]  Date: 02/16/23  Reference # None  Spoke with: Online  Type of SX: Outpatient  Location: [facility]  CPT: 25618   DX: [phone]  SX area:  Lt wrist  Insurance: Baylee Skinner

## 2023-02-15 ENCOUNTER — HOSPITAL ENCOUNTER (OUTPATIENT)
Dept: GENERAL RADIOLOGY | Age: 63
Discharge: HOME | End: 2023-02-15
Payer: COMMERCIAL

## 2023-02-15 ENCOUNTER — TELEPHONE (OUTPATIENT)
Dept: ORTHOPEDIC SURGERY | Age: 63
End: 2023-02-15

## 2023-02-15 DIAGNOSIS — Z78.0 POST-MENOPAUSAL: ICD-10-CM

## 2023-02-15 DIAGNOSIS — M81.0 AGE-RELATED OSTEOPOROSIS WITHOUT CURRENT PATHOLOGICAL FRACTURE: ICD-10-CM

## 2023-02-15 DIAGNOSIS — M85.80 SENILE OSTEOPENIA: ICD-10-CM

## 2023-02-15 PROCEDURE — 77080 DXA BONE DENSITY AXIAL: CPT

## 2023-02-15 NOTE — PROGRESS NOTES
Patient not reached. Preop instructions left on voice mail. [phone]______________    -Date_2/16/2023______time_0930______arrival_____0800_______  -Nothing to eat or drink after midnight  -Responsible adult 25 or older to stay on site while you are here and drive you home and stay with you after  -Follow any instructions your doctors office has given you  -Bring a complete list of all your medications and supplements  -If you normally take the following medications in the morning please do so with a small    sip of water-heart,blood pressure,seizure,breathing or thyroid-avoid water pilll Do not take blood pressure medications ending in \"maria [place] car[place]\" or \"pril\" the AM of surgery or the ki prior  -You may use your inhalers  -Take half of your normal dose of any long acting insulins the night before-do not take    any diabetic medications in the morning  -Follow your doctors instructions regarding blood thinners  -Any questions call your surgeons office            VISITOR POLICY(subject to change)    Current policy is 2 visitors per patient. No children. Masks at discretion of facility. Visiting hours are 8a-8p. Overnight visitors will be at the discretion of the nurse. All policies are subject to change.

## 2023-02-15 NOTE — TELEPHONE ENCOUNTER
Left voicemail notifying patient of surgery time change. Advised she is scheduled for an 8:30 AM surgery and needs to report to the [facility] at 6:30 AM tomorrow. Asked her to call back to confirm that she got the message.

## 2023-02-15 NOTE — PROGRESS NOTES
Name_______________________________________Printed:____________________  Date and time of surgery_2/16/2023_____0830__________________Arrival Time:_________0730_______   1. The instructions given regarding when and if a patient needs to stop oral intake prior to surgery varies.Follow the specific instructions you were given                  _x__Nothing to eat or to drink after Midnight the night before.                   ____Carbo loading or instructions will be given to select patients-if you have been given those instructions -please do the following                           The evening before your surgery after dinner before midnight drink 40 ounces of gatorade.If you are diabetic use sugar free.  The morning of surgery drink 40 ounces of water.This needs to be finished 3 hours prior to your surgery start time.    2. Take the following pills with a small sip of water on the morning of surgery___________________________________________________                  Do not take blood pressure medications ending in pril or sartan the ki prior to surgery or the morning of surgery. Dr Barcenas's patient are not to take any medications the AM of surgery.         3. Aspirin, Ibuprofen, Advil, Naproxen, Vitamin E and other Anti-inflammatory products and supplements should be stopped for 5 -7days before surgery or as directed by your physician.   4. Check with your Doctor regarding stopping Plavix, Coumadin,Eliquis, Lovenox,Effient,Pradaxa,Xarelto, Fragmin or other blood thinners and follow their instructions.   5. Do not smoke, and do not drink any alcoholic beverages 24 hours prior to surgery.  This includes NA Beer.Refrain from the usage of any recreational drugs.   6. You may brush your teeth and gargle the morning of surgery.  DO NOT SWALLOW WATER   7. You MUST make arrangements for a responsible adult to stay on site while you are here and take you home after your surgery. You will not be allowed to leave alone or drive  yourself home. It is strongly suggested someone stay with you the first 24 hrs. Your surgery will be cancelled if you do not have a ride home. 8. A parent/legal guardian must accompany a child scheduled for surgery and plan to stay at the hospital until the child is discharged. Please do not bring other children with you. 9. Please wear simple, loose fitting clothing to the hospital.  Kasandra Zuleta not bring valuables (money, credit cards, checkbooks, etc.) Do not wear any makeup (including no eye makeup) or nail polish on your fingers or toes. 10. DO NOT wear any jewelry or piercings on day of surgery. All body piercing jewelry must be removed. 11. If you have ___dentures, they will be removed before going to the OR; we will provide you a container. If you wear ___contact lenses or ___glasses, they will be removed; please bring a case for them. 12. Please see your family doctor/pediatrician for a history & physical and/or concerning medications. Bring any test results/reports from your physician's office. PCP__________________Phone___________H&P Appt. Date________             13 If you  have a Living Will and Durable Power of  for Healthcare, please bring in a copy. 15. Notify your Surgeon if you develop any illness between now and surgery  time, cough, cold, fever, sore throat, nausea, vomiting, etc.  Please notify your surgeon if you experience dizziness, shortness of breath or blurred vision between now & the time of your surgery             15. DO NOT shave your operative site 96 hours prior to surgery. For face & neck surgery, men may use an electric razor 48 hours prior to surgery. 16. Shower the night before or morning of surgery using an antibacterial soap or as you have been instructed. 17. To provide excellent care visitors will be limited to one in the room at any given time.              18.  Please bring picture ID and insurance card. 19.  Visit our web site for additional information:  [email or web address]              20.During flu season no children under the age of 15 are permitted in the hospital for the safety of all patients. 21. If you take a long acting insulin in the evening only  take half of your usual  dose the night  before your procedure              22. If you use a c-pap please bring DOS if staying overnight,             23.For your convenience [address] [facility] has a pharmacy on site to fill your prescriptions. 24. If you use oxygen and have a portable tank please bring it  with you the DOS             25. Bring a complete list of all your medications with name and dose include any supplements. 26. Other__________________________________________   *Please call pre admission testing if you any further questions   [address]    [place] [id number]. [facility]  [id number]   [address] [place] [address]       VISITOR POLICY(subject to change)    Current policy is 2 visitors per patient. No children. Mask is  at the discretion of the facility. Visiting hours are 8a-8p. Overnight visitors will be at the discretion of the nurse. All policies subject to change. All above information reviewed with patient in person or by phone. Patient verbalizes understanding. All questions and concerns addressed.                                                                                                  Patient/Rep___patient_________________                                                                                                                                    PRE OP INSTRUCTIONS

## 2023-02-16 ENCOUNTER — APPOINTMENT (OUTPATIENT)
Dept: GENERAL RADIOLOGY | Age: 63
End: 2023-02-16
Attending: ORTHOPAEDIC SURGERY
Payer: COMMERCIAL

## 2023-02-16 ENCOUNTER — TELEPHONE (OUTPATIENT)
Dept: ORTHOPEDIC SURGERY | Age: 63
End: 2023-02-16

## 2023-02-16 ENCOUNTER — HOSPITAL ENCOUNTER (OUTPATIENT)
Age: 63
Setting detail: OUTPATIENT SURGERY
Discharge: HOME | End: 2023-02-16
Attending: ORTHOPAEDIC SURGERY | Admitting: ORTHOPAEDIC SURGERY
Payer: COMMERCIAL

## 2023-02-16 ENCOUNTER — ANESTHESIA (OUTPATIENT)
Dept: OPERATING ROOM | Age: 63
End: 2023-02-16
Payer: COMMERCIAL

## 2023-02-16 ENCOUNTER — ANESTHESIA EVENT (OUTPATIENT)
Dept: OPERATING ROOM | Age: 63
End: 2023-02-16
Payer: COMMERCIAL

## 2023-02-16 VITALS
BODY MASS INDEX: 25.76 KG/M2 | DIASTOLIC BLOOD PRESSURE: 57 MMHG | SYSTOLIC BLOOD PRESSURE: 135 MMHG | WEIGHT: 140 LBS | RESPIRATION RATE: 15 BRPM | TEMPERATURE: 97.4 F | HEIGHT: 62 IN | OXYGEN SATURATION: 94 % | HEART RATE: 96 BPM

## 2023-02-16 PROBLEM — S52.502A CLOSED FRACTURE OF LEFT DISTAL RADIUS: Status: ACTIVE | Noted: 2023-02-16

## 2023-02-16 PROBLEM — S92.421A DISPLACED FRACTURE OF DISTAL PHALANX OF RIGHT GREAT TOE, INITIAL ENCOUNTER FOR CLOSED FRACTURE: Status: ACTIVE | Noted: 2023-02-16

## 2023-02-16 PROBLEM — S92.512A CLOSED DISPLACED FRACTURE OF PROXIMAL PHALANX OF LESSER TOE OF LEFT FOOT: Status: ACTIVE | Noted: 2023-02-16

## 2023-02-16 PROBLEM — S92.422B DISPLACED FRACTURE OF DISTAL PHALANX OF LEFT GREAT TOE, INITIAL ENCOUNTER FOR OPEN FRACTURE: Status: ACTIVE | Noted: 2023-02-16

## 2023-02-16 LAB
ANION GAP SERPL CALCULATED.3IONS-SCNC: 13 MMOL/L (ref 3–16)
BUN BLDV-MCNC: 17 MG/DL (ref 7–20)
CALCIUM SERPL-MCNC: 10.1 MG/DL (ref 8.3–10.6)
CHLORIDE BLD-SCNC: 100 MMOL/L (ref 99–110)
CO2: 22 MMOL/L (ref 21–32)
CREAT SERPL-MCNC: 0.8 MG/DL (ref 0.6–1.2)
GFR SERPL CREATININE-BSD FRML MDRD: >60 ML/MIN/{1.73_M2}
GLUCOSE BLD-MCNC: 118 MG/DL (ref 70–99)
GLUCOSE BLD-MCNC: 161 MG/DL (ref 70–99)
GLUCOSE BLD-MCNC: 163 MG/DL (ref 70–99)
PERFORMED ON: ABNORMAL
PERFORMED ON: ABNORMAL
POTASSIUM SERPL-SCNC: 3.9 MMOL/L (ref 3.5–5.1)
SODIUM BLD-SCNC: 135 MMOL/L (ref 136–145)

## 2023-02-16 PROCEDURE — 6360000002 HC RX W HCPCS: Performed by: NURSE ANESTHETIST, CERTIFIED REGISTERED

## 2023-02-16 PROCEDURE — 3700000001 HC ADD 15 MINUTES (ANESTHESIA): Performed by: ORTHOPAEDIC SURGERY

## 2023-02-16 PROCEDURE — C1769 GUIDE WIRE: HCPCS | Performed by: ORTHOPAEDIC SURGERY

## 2023-02-16 PROCEDURE — 6360000002 HC RX W HCPCS: Performed by: ORTHOPAEDIC SURGERY

## 2023-02-16 PROCEDURE — 3600000014 HC SURGERY LEVEL 4 ADDTL 15MIN: Performed by: ORTHOPAEDIC SURGERY

## 2023-02-16 PROCEDURE — 7100000000 HC PACU RECOVERY - FIRST 15 MIN: Performed by: ORTHOPAEDIC SURGERY

## 2023-02-16 PROCEDURE — 2500000003 HC RX 250 WO HCPCS: Performed by: ORTHOPAEDIC SURGERY

## 2023-02-16 PROCEDURE — 7100000010 HC PHASE II RECOVERY - FIRST 15 MIN: Performed by: ORTHOPAEDIC SURGERY

## 2023-02-16 PROCEDURE — 7100000011 HC PHASE II RECOVERY - ADDTL 15 MIN: Performed by: ORTHOPAEDIC SURGERY

## 2023-02-16 PROCEDURE — 7100000001 HC PACU RECOVERY - ADDTL 15 MIN: Performed by: ORTHOPAEDIC SURGERY

## 2023-02-16 PROCEDURE — 80048 BASIC METABOLIC PNL TOTAL CA: CPT

## 2023-02-16 PROCEDURE — 3600000004 HC SURGERY LEVEL 4 BASE: Performed by: ORTHOPAEDIC SURGERY

## 2023-02-16 PROCEDURE — 2720000010 HC SURG SUPPLY STERILE: Performed by: ORTHOPAEDIC SURGERY

## 2023-02-16 PROCEDURE — C1713 ANCHOR/SCREW BN/BN,TIS/BN: HCPCS | Performed by: ORTHOPAEDIC SURGERY

## 2023-02-16 PROCEDURE — 3700000000 HC ANESTHESIA ATTENDED CARE: Performed by: ORTHOPAEDIC SURGERY

## 2023-02-16 PROCEDURE — 73100 X-RAY EXAM OF WRIST: CPT

## 2023-02-16 PROCEDURE — 2709999900 HC NON-CHARGEABLE SUPPLY: Performed by: ORTHOPAEDIC SURGERY

## 2023-02-16 PROCEDURE — A4217 STERILE WATER/SALINE, 500 ML: HCPCS | Performed by: ORTHOPAEDIC SURGERY

## 2023-02-16 PROCEDURE — 2580000003 HC RX 258: Performed by: ORTHOPAEDIC SURGERY

## 2023-02-16 PROCEDURE — 2500000003 HC RX 250 WO HCPCS: Performed by: NURSE ANESTHETIST, CERTIFIED REGISTERED

## 2023-02-16 PROCEDURE — 2580000003 HC RX 258: Performed by: NURSE ANESTHETIST, CERTIFIED REGISTERED

## 2023-02-16 PROCEDURE — 6370000000 HC RX 637 (ALT 250 FOR IP): Performed by: ANESTHESIOLOGY

## 2023-02-16 DEVICE — VOLAR PLATE INTERMEDIATE LEFT, SHORT
Type: IMPLANTABLE DEVICE | Site: WRIST | Status: FUNCTIONAL
Brand: VARIAX

## 2023-02-16 DEVICE — BONE SCREW, FULLY THREADED, T8
Type: IMPLANTABLE DEVICE | Site: WRIST | Status: FUNCTIONAL
Brand: VARIAX

## 2023-02-16 DEVICE — LOCKING SCREW, FULLY THREADED,T8
Type: IMPLANTABLE DEVICE | Site: WRIST | Status: FUNCTIONAL
Brand: VARIAX

## 2023-02-16 RX ORDER — MIDAZOLAM HYDROCHLORIDE 1 MG/ML
INJECTION INTRAMUSCULAR; INTRAVENOUS PRN
Status: DISCONTINUED | OUTPATIENT
Start: 2023-02-16 | End: 2023-02-16 | Stop reason: SDUPTHER

## 2023-02-16 RX ORDER — SODIUM CHLORIDE 0.9 % (FLUSH) 0.9 %
5-40 SYRINGE (ML) INJECTION EVERY 12 HOURS SCHEDULED
Status: DISCONTINUED | OUTPATIENT
Start: 2023-02-16 | End: 2023-02-16 | Stop reason: HOSPADM

## 2023-02-16 RX ORDER — OXYCODONE HYDROCHLORIDE 5 MG/1
5 TABLET ORAL
Status: COMPLETED | OUTPATIENT
Start: 2023-02-16 | End: 2023-02-16

## 2023-02-16 RX ORDER — PROCHLORPERAZINE EDISYLATE 5 MG/ML
5 INJECTION INTRAMUSCULAR; INTRAVENOUS
Status: DISCONTINUED | OUTPATIENT
Start: 2023-02-16 | End: 2023-02-16 | Stop reason: HOSPADM

## 2023-02-16 RX ORDER — SODIUM CHLORIDE 0.9 % (FLUSH) 0.9 %
5-40 SYRINGE (ML) INJECTION PRN
Status: DISCONTINUED | OUTPATIENT
Start: 2023-02-16 | End: 2023-02-16 | Stop reason: HOSPADM

## 2023-02-16 RX ORDER — LIDOCAINE HYDROCHLORIDE 20 MG/ML
INJECTION, SOLUTION INFILTRATION; PERINEURAL PRN
Status: DISCONTINUED | OUTPATIENT
Start: 2023-02-16 | End: 2023-02-16 | Stop reason: SDUPTHER

## 2023-02-16 RX ORDER — FENTANYL CITRATE 50 UG/ML
INJECTION, SOLUTION INTRAMUSCULAR; INTRAVENOUS PRN
Status: DISCONTINUED | OUTPATIENT
Start: 2023-02-16 | End: 2023-02-16 | Stop reason: SDUPTHER

## 2023-02-16 RX ORDER — ONDANSETRON 2 MG/ML
INJECTION INTRAMUSCULAR; INTRAVENOUS PRN
Status: DISCONTINUED | OUTPATIENT
Start: 2023-02-16 | End: 2023-02-16 | Stop reason: SDUPTHER

## 2023-02-16 RX ORDER — LABETALOL HYDROCHLORIDE 5 MG/ML
10 INJECTION, SOLUTION INTRAVENOUS
Status: DISCONTINUED | OUTPATIENT
Start: 2023-02-16 | End: 2023-02-16 | Stop reason: HOSPADM

## 2023-02-16 RX ORDER — FENTANYL CITRATE 50 UG/ML
25 INJECTION, SOLUTION INTRAMUSCULAR; INTRAVENOUS EVERY 5 MIN PRN
Status: DISCONTINUED | OUTPATIENT
Start: 2023-02-16 | End: 2023-02-16 | Stop reason: HOSPADM

## 2023-02-16 RX ORDER — CLINDAMYCIN PHOSPHATE 900 MG/50ML
900 INJECTION INTRAVENOUS
Status: COMPLETED | OUTPATIENT
Start: 2023-02-16 | End: 2023-02-16

## 2023-02-16 RX ORDER — ONDANSETRON 2 MG/ML
4 INJECTION INTRAMUSCULAR; INTRAVENOUS
Status: DISCONTINUED | OUTPATIENT
Start: 2023-02-16 | End: 2023-02-16 | Stop reason: HOSPADM

## 2023-02-16 RX ORDER — BUPIVACAINE HYDROCHLORIDE 5 MG/ML
INJECTION, SOLUTION EPIDURAL; INTRACAUDAL
Status: COMPLETED | OUTPATIENT
Start: 2023-02-16 | End: 2023-02-16

## 2023-02-16 RX ORDER — PROPOFOL 10 MG/ML
INJECTION, EMULSION INTRAVENOUS PRN
Status: DISCONTINUED | OUTPATIENT
Start: 2023-02-16 | End: 2023-02-16 | Stop reason: SDUPTHER

## 2023-02-16 RX ORDER — PHENYLEPHRINE HCL IN 0.9% NACL 1 MG/10 ML
SYRINGE (ML) INTRAVENOUS PRN
Status: DISCONTINUED | OUTPATIENT
Start: 2023-02-16 | End: 2023-02-16 | Stop reason: SDUPTHER

## 2023-02-16 RX ORDER — DEXAMETHASONE SODIUM PHOSPHATE 4 MG/ML
INJECTION, SOLUTION INTRA-ARTICULAR; INTRALESIONAL; INTRAMUSCULAR; INTRAVENOUS; SOFT TISSUE PRN
Status: DISCONTINUED | OUTPATIENT
Start: 2023-02-16 | End: 2023-02-16 | Stop reason: SDUPTHER

## 2023-02-16 RX ORDER — EPHEDRINE SULFATE/0.9% NACL/PF 50 MG/5 ML
SYRINGE (ML) INTRAVENOUS PRN
Status: DISCONTINUED | OUTPATIENT
Start: 2023-02-16 | End: 2023-02-16 | Stop reason: SDUPTHER

## 2023-02-16 RX ORDER — LIDOCAINE HYDROCHLORIDE 10 MG/ML
1 INJECTION, SOLUTION EPIDURAL; INFILTRATION; INTRACAUDAL; PERINEURAL
Status: DISCONTINUED | OUTPATIENT
Start: 2023-02-16 | End: 2023-02-16 | Stop reason: HOSPADM

## 2023-02-16 RX ORDER — SODIUM CHLORIDE 9 MG/ML
25 INJECTION, SOLUTION INTRAVENOUS PRN
Status: DISCONTINUED | OUTPATIENT
Start: 2023-02-16 | End: 2023-02-16 | Stop reason: HOSPADM

## 2023-02-16 RX ORDER — SODIUM CHLORIDE 9 MG/ML
INJECTION, SOLUTION INTRAVENOUS CONTINUOUS
Status: DISCONTINUED | OUTPATIENT
Start: 2023-02-16 | End: 2023-02-16 | Stop reason: HOSPADM

## 2023-02-16 RX ORDER — HYDRALAZINE HYDROCHLORIDE 20 MG/ML
10 INJECTION INTRAMUSCULAR; INTRAVENOUS
Status: DISCONTINUED | OUTPATIENT
Start: 2023-02-16 | End: 2023-02-16 | Stop reason: HOSPADM

## 2023-02-16 RX ORDER — SODIUM CHLORIDE 9 MG/ML
INJECTION, SOLUTION INTRAVENOUS PRN
Status: DISCONTINUED | OUTPATIENT
Start: 2023-02-16 | End: 2023-02-16 | Stop reason: HOSPADM

## 2023-02-16 RX ORDER — HYDROMORPHONE HCL 110MG/55ML
0.5 PATIENT CONTROLLED ANALGESIA SYRINGE INTRAVENOUS EVERY 5 MIN PRN
Status: DISCONTINUED | OUTPATIENT
Start: 2023-02-16 | End: 2023-02-16 | Stop reason: HOSPADM

## 2023-02-16 RX ORDER — SODIUM CHLORIDE 9 MG/ML
INJECTION, SOLUTION INTRAVENOUS CONTINUOUS PRN
Status: DISCONTINUED | OUTPATIENT
Start: 2023-02-16 | End: 2023-02-16 | Stop reason: SDUPTHER

## 2023-02-16 RX ADMIN — LIDOCAINE HYDROCHLORIDE 100 MG: 20 INJECTION, SOLUTION INFILTRATION; PERINEURAL at 08:52

## 2023-02-16 RX ADMIN — FENTANYL CITRATE 25 MCG: 50 INJECTION, SOLUTION INTRAMUSCULAR; INTRAVENOUS at 09:39

## 2023-02-16 RX ADMIN — CLINDAMYCIN PHOSPHATE 900 MG: 900 INJECTION, SOLUTION INTRAVENOUS at 08:45

## 2023-02-16 RX ADMIN — Medication 10 MG: at 09:23

## 2023-02-16 RX ADMIN — Medication 10 MG: at 09:12

## 2023-02-16 RX ADMIN — Medication 200 MCG: at 09:09

## 2023-02-16 RX ADMIN — Medication 200 MCG: at 08:56

## 2023-02-16 RX ADMIN — PROPOFOL 200 MG: 10 INJECTION, EMULSION INTRAVENOUS at 08:52

## 2023-02-16 RX ADMIN — MIDAZOLAM 2 MG: 1 INJECTION INTRAMUSCULAR; INTRAVENOUS at 08:52

## 2023-02-16 RX ADMIN — DEXAMETHASONE SODIUM PHOSPHATE 8 MG: 4 INJECTION, SOLUTION INTRAMUSCULAR; INTRAVENOUS at 08:56

## 2023-02-16 RX ADMIN — Medication 10 MG: at 09:32

## 2023-02-16 RX ADMIN — ONDANSETRON 4 MG: 2 INJECTION INTRAMUSCULAR; INTRAVENOUS at 08:56

## 2023-02-16 RX ADMIN — FENTANYL CITRATE 50 MCG: 50 INJECTION, SOLUTION INTRAMUSCULAR; INTRAVENOUS at 08:52

## 2023-02-16 RX ADMIN — OXYCODONE HYDROCHLORIDE 5 MG: 5 TABLET ORAL at 10:23

## 2023-02-16 RX ADMIN — Medication 200 MCG: at 09:01

## 2023-02-16 RX ADMIN — SODIUM CHLORIDE: 9 INJECTION, SOLUTION INTRAVENOUS at 08:47

## 2023-02-16 RX ADMIN — SODIUM CHLORIDE: 9 INJECTION, SOLUTION INTRAVENOUS at 09:16

## 2023-02-16 RX ADMIN — SODIUM CHLORIDE: 9 INJECTION, SOLUTION INTRAVENOUS at 07:34

## 2023-02-16 ASSESSMENT — PAIN SCALES - GENERAL
PAINLEVEL_OUTOF10: 4
PAINLEVEL_OUTOF10: 6
PAINLEVEL_OUTOF10: 5
PAINLEVEL_OUTOF10: 4
PAINLEVEL_OUTOF10: 6

## 2023-02-16 ASSESSMENT — PAIN DESCRIPTION - PAIN TYPE: TYPE: SURGICAL PAIN

## 2023-02-16 ASSESSMENT — PAIN DESCRIPTION - LOCATION
LOCATION: WRIST
LOCATION: WRIST

## 2023-02-16 ASSESSMENT — PAIN SCALES - WONG BAKER
WONGBAKER_NUMERICALRESPONSE: 0

## 2023-02-16 ASSESSMENT — PAIN - FUNCTIONAL ASSESSMENT: PAIN_FUNCTIONAL_ASSESSMENT: 0-10

## 2023-02-16 ASSESSMENT — PAIN DESCRIPTION - ORIENTATION
ORIENTATION: LEFT
ORIENTATION: LEFT

## 2023-02-16 ASSESSMENT — PAIN DESCRIPTION - DESCRIPTORS: DESCRIPTORS: ACHING

## 2023-02-16 ASSESSMENT — ENCOUNTER SYMPTOMS: SHORTNESS OF BREATH: 0

## 2023-02-16 NOTE — ANESTHESIA PRE PROCEDURE
Department of Anesthesiology  Preprocedure Note       Name:  Jean Coppola   Age:  60 y.o.  :  1960                                          MRN:  [id number]         Date:  2023      Surgeon: Fiona Prajapati):  Yesika Maradiaga MD    Procedure: Procedure(s):  OPEN REDUCTION INTERNAL FIXATION LEFT DISTAL RADIUS-MARY    Medications prior to admission:   Prior to Admission medications    Medication Sig Start Date End Date Taking?  Authorizing Provider   clindamycin (CLEOCIN) 300 MG capsule Take 1 capsule by mouth 3 times daily for 7 days 23  Arnold Frye MD   montelukast (SINGULAIR) 10 MG tablet TAKE 1 TABLET BY MOUTH EVERY NIGHT AS NEEDED FOR ALLERGIES 22ina Foot, DO   valsartan (DIOVAN) 320 MG tablet TAKE 1 TABLET BY MOUTH EVERY DAY 22   Phoebe Foot, DO   loratadine (CLARITIN) 10 MG tablet TAKE 1 TABLET BY MOUTH EVERY DAY 22   Phoebe Foot, DO   rosuvastatin (CRESTOR) 10 MG tablet TAKE ONE TABLET BY MOUTH DAILY 22   Phoebe Foot, DO   omeprazole (PRILOSEC) 40 MG delayed release capsule TAKE 1 CAPSULE BY MOUTH EVERY DAY 22   Phoebe Foot, DO   dulaglutide (TRULICITY) 1.5 DD/4.4BE SC injection Inject 0.5 mLs into the skin once a week INJECT 1.5 MG UNDER THE SKIN ONCE WEEKLY 22ina Foot, DO   insulin glargine (LANTUS SOLOSTAR) 100 UNIT/ML injection pen Inject 60 Units into the skin nightly 22ina Foot, DO   glipiZIDE (GLUCOTROL) 5 MG tablet Take 1 tablet by mouth 2 times daily (before meals) 22   Phoebe Foot, DO   metFORMIN (GLUCOPHAGE) 850 MG tablet Take 1 tablet by mouth 2 times daily (with meals) 22ina Foot, DO   Insulin Pen Needle (PEN NEEDLES 3/16\") 31G X 5 MM MISC 1 each by Does not apply route daily 22   Phoebe Foot, DO   Azelastine HCl 137 MCG/SPRAY SOLN SPRAY 2 SPRAYS IN [facility] NOSTRIL DAILY 22   Dariana Thomas, DO   blood glucose monitor strips Test 2 times a day & as needed for symptoms of irregular blood glucose 6/6/22   Salazar Smith DO   blood glucose monitor kit and supplies Check sugars fasting and 2 hours after largest meal. 6/6/22   Salazar Smith DO   Lancets MISC 1 each by Does not apply route 2 times daily 6/6/22   Salazar Smith DO   bisoprolol-hydroCHLOROthiazide [facility]) 5-6.25 MG per tablet Take 1 tablet by mouth daily 6/6/22   Salazar Smith DO   JARDIANCE 25 MG tablet TAKE ONE TABLET BY MOUTH DAILY 3/30/22   Salazar Smith DO   fluticasone [facility]) 50 MCG/ACT nasal spray SPRAY TWO SPRAYS IN EACH NOSTRIL ONCE DAILY 3/2/22   Salazar Smith DO   sodium chloride (OCEAN, BABY AYR) 0.65 % nasal spray 1 spray by Nasal route as needed for Congestion    Historical Provider, MD   blood glucose monitor kit and supplies Test BID times a day & as needed for symptoms of irregular blood glucose. 4/26/19   Salazar Smith DO   blood glucose monitor strips Test BID  times a day & as needed for symptoms of irregular blood glucose.  4/26/19   Salazar Smith DO   vitamin C (ASCORBIC ACID) 500 MG tablet Take 500 mg by mouth daily    Historical Provider, MD   CALCIUM PO Take by mouth 2 times daily    Historical Provider, MD   Denosumab (PROLIA SC) Inject into the skin every 6 months    Historical Provider, MD   anastrozole (ARIMIDEX) 1 MG tablet Take 1 mg by mouth nightly    Historical Provider, MD   Simethicone 180 MG CAPS Take by mouth 2 times daily as needed    Historical Provider, MD   Cholecalciferol (VITAMIN D3) 2000 units CAPS Take by mouth daily     Historical Provider, MD   Probiotic Product (PROBIOTIC DAILY PO) Take by mouth daily     Historical Provider, MD       Current medications:    Current Facility-Administered Medications   Medication Dose Route Frequency Provider Last Rate Last Admin    clindamycin (CLEOCIN) 900 mg in dextrose 5 % 50 mL IVPB  900 mg IntraVENous On Call to 5856 Partha Patton MD        0.9 % sodium chloride infusion   IntraVENous Continuous Ever Menon MD           Allergies: Allergies   Allergen Reactions    Cefdinir Other (See Comments)     *Cephalosporins*: Pruritus.  Erythromycin Swelling     Throat swelling.  Ezetimibe-Simvastatin Other (See Comments)     Muscle cramps.  Lisinopril Other (See Comments)     Cough.  Niacin And Related Other (See Comments)     Niacin (antihyperlipidemic) *Antihyperlipidmics*: S.E.   Lorri Jackson Other Other (See Comments)     darvocet    Pcn [Penicillins] Hives    Zithromax [Azithromycin] Swelling     Throat swelling. Problem List:    Patient Active Problem List   Diagnosis Code    Generalized osteoarthrosis, involving multiple sites M15.9    Type 2 diabetes mellitus without complication, without long-term current use of insulin (HCC) E11.9    Essential hypertension I10    Hyperlipidemia LDL goal <70 E78.5    Diaphragmatic hernia K44.9    Anxiety F41.9    Medically noncompliant Z91.199    History of breast cancer Z85.3    Symptomatic cholelithiasis K80.20    Expressive aphasia R47.01    Migraine without aura and without status migrainosus, not intractable G43.009    Age-related osteoporosis without current pathological fracture M81.0    Cat scratch W55. 03XA    Seasonal allergies J30.2    Bilateral hearing loss H91.93    Trigger little finger of right hand M65.351       Past Medical History:        Diagnosis Date    Allergic rhinitis, cause unspecified     Cancer ([facility] [address]     left breast    Generalized osteoarthrosis, involving multiple sites     Pure hypercholesterolemia     Pure hyperglyceridemia     Type II or unspecified type diabetes mellitus without mention of complication, uncontrolled     Unspecified essential hypertension        Past Surgical History:        Procedure Laterality Date    CHOLECYSTECTOMY, LAPAROSCOPIC N/A 12/19/2018    LAPAROSCOPIC CHOLECYSTECTOMY; CHOLANGIOGRAM performed by Coretta Alexander MD at [facility]  COLONOSCOPY      colonoscopy ordered 6/2010; 5/2011; 11/2011, recommended again and refused at this time on 3/30/12 & 4/2012     COLONOSCOPY N/A 11/29/2018    COLONOSCOPY POLYPECTOMY SNARE/COLD BIOPSY performed by Magnus Day MD at [address] Left 04/18/2019    Skin sparing mastectomy Sentinal Lymph node Bx Tissue expander    NASAL SEPTUM SURGERY      TONSILLECTOMY      WRIST SURGERY Right        Social History:    Social History     Tobacco Use    Smoking status: Never    Smokeless tobacco: Never   Substance Use Topics    Alcohol use: No     Alcohol/week: 0.0 standard drinks                                Counseling given: Not Answered      Vital Signs (Current):   Vitals:    02/16/23 0656   BP: 121/69   Pulse: (!) 101   Resp: 16   Temp: 97.4 °F (36.3 °C)   TempSrc: Temporal   SpO2: 95%   Weight: 140 lb (63.5 kg)   Height: 5' 2\" (1.575 m)                                              BP Readings from Last 3 Encounters:   02/16/23 121/69   02/09/23 (!) 147/75   12/19/22 (!) 130/58       NPO Status:                                                                                 BMI:   Wt Readings from Last 3 Encounters:   02/16/23 140 lb (63.5 kg)   02/09/23 145 lb (65.8 kg)   11/07/22 144 lb (65.3 kg)     Body mass index is 25.61 kg/m².     CBC:   Lab Results   Component Value Date/Time    WBC 6.4 03/07/2022 09:35 AM    RBC 4.35 03/07/2022 09:35 AM    HGB 13.3 03/07/2022 09:35 AM    HCT 39.2 03/07/2022 09:35 AM    MCV 90.1 03/07/2022 09:35 AM    RDW 13.5 03/07/2022 09:35 AM     03/07/2022 09:35 AM       CMP:   Lab Results   Component Value Date/Time     11/04/2022 09:38 AM    K 4.3 11/04/2022 09:38 AM     11/04/2022 09:38 AM    CO2 23 11/04/2022 09:38 AM    BUN 11 11/04/2022 09:38 AM    CREATININE 0.7 11/04/2022 09:38 AM    GFRAA >60 06/06/2022 08:56 AM    AGRATIO 1.8 11/04/2022 09:38 AM    LABGLOM >60 11/04/2022 09:38 AM    GLUCOSE 112 11/04/2022 09:38 AM PROT 6.9 11/04/2022 09:38 AM    CALCIUM 9.3 11/04/2022 09:38 AM    BILITOT 0.7 11/04/2022 09:38 AM    ALKPHOS 89 11/04/2022 09:38 AM    AST 22 11/04/2022 09:38 AM    ALT 15 11/04/2022 09:38 AM       POC Tests:   Recent Labs     02/16/23  0716   POCGLU 161*       Coags: No results found for: PROTIME, INR, APTT    HCG (If Applicable): No results found for: PREGTESTUR, PREGSERUM, HCG, HCGQUANT     ABGs: No results found for: PHART, PO2ART, TSH9FIT, LNK7GCF, BEART, Q1RXWSWI     Type & Screen (If Applicable):  No results found for: LABABO, LABRH    Drug/Infectious Status (If Applicable):  No results found for: HIV, HEPCAB    COVID-19 Screening (If Applicable):   Lab Results   Component Value Date/Time    COVID19 Not Detected 10/06/2021 11:22 AM           Anesthesia Evaluation  Patient summary reviewed and Nursing notes reviewed no history of anesthetic complications:   Airway: Mallampati: II  TM distance: >3 FB   Neck ROM: full  Mouth opening: < 3 FB   Dental: normal exam         Pulmonary:       (-) asthma and shortness of breath                           Cardiovascular:    (+) hypertension:,     (-)  angina                Neuro/Psych:   (+) headaches: migraine headaches,    (-) CVA           GI/Hepatic/Renal:   (+) hiatal hernia,      (-) GERD and liver disease       Endo/Other:    (+) DiabetesType II DM, , : arthritis: OA., malignancy/cancer. (-) hypothyroidism               Abdominal:             Vascular:     - PVD. Other Findings:           Anesthesia Plan      general     ASA 3       Induction: intravenous. MIPS: Postoperative opioids intended and Prophylactic antiemetics administered. Anesthetic plan and risks discussed with patient. Use of blood products discussed with patient whom. Plan discussed with CRNA.                     Ana Lozada MD   2/16/2023

## 2023-02-16 NOTE — PROGRESS NOTES
Pt  d/c'd per w/c. Vss. Pt stable. Sling in place. Pt. Has surgical shoes bilaterally (from home). Toe dressings(donya feet) clean/dry/intact. Accomp by mother and friend.

## 2023-02-16 NOTE — BRIEF OP NOTE
Brief Postoperative Note      Patient: Rick Klein  YOB: 1960  MRN: [id number]    Date of Procedure: 2/16/2023    Pre-Op Diagnosis: S52.502A LEFT DISTAL RADIUS FRACTURE    Post-Op Diagnosis: Same       Procedure(s):  OPEN REDUCTION INTERNAL FIXATION LEFT DISTAL RADIUS-MARY    Surgeon(s):  Tala Frank MD    Assistant: Melly Campbell CNP    Anesthesia: General    Estimated Blood Loss (mL): Minimal    Complications: None    Specimens:   * No specimens in log *    Implants:  Implant Name Type Inv. Item Serial No.  Lot No. LRB No. Used Action   PLATE BNE 11 H SHT L DST RAD VOLAR INTMED - [id number]  PLATE BNE 11 H SHT L DST RAD VOLAR INTMED  [employer] [facility]  Left 1 Implanted   SCREW BNE L12MM OD27MM ST DANIEL FULL THRD T8 [facility] - [id number]  SCREW BNE L12MM OD27MM ST DANIEL FULL THRD T8 [facility]  [employer] [facility]  Left 1 Implanted   SCREW BNE L14MM OD27MM ST DANIEL FULL THRD T8 [facility] - [id number]  SCREW BNE L14MM OD27MM ST DANIEL FULL THRD T8 [facility]  [employer] [facility]  Left 1 Implanted   SCREW BNE L16MM OD27MM ST DANIEL FULL THRD T8 [facility] - [id number]  SCREW BNE L16MM OD27MM ST DANIEL FULL THRD T8 [facility]  [employer] [facility]  Left 1 Implanted   SCREW BNE L18MM OD27MM ST DANIEL FULL THRD T8 [facility] - [id number]  SCREW BNE L18MM OD27MM ST DANIEL FULL THRD T8 [facility]  [employer] [facility]  Left 4 Implanted   SCREW BNE L20MM OD27MM ST DANIEL FULL THRD T8 [facility] - [id number]  SCREW BNE L20MM OD27MM ST DANIEL FULL THRD T8 [facility]  [employer] [facility]  Left 2 Implanted   SCREW BNE L14MM OD27MM ST FULL THRD T8 [facility] - [id number]  SCREW BNE L14MM OD27MM ST FULL THRD T8 [facility]  [employer] [facility]  Left 1 Implanted         Drains: * No LDAs found *    Findings: Same.     Electronically signed by Tala Frank MD on 2/16/2023 at 12:34 PM

## 2023-02-16 NOTE — ANESTHESIA POSTPROCEDURE EVALUATION
Department of Anesthesiology  Postprocedure Note    Patient: Nicole Cabrera  MRN: [id number]  YOB: 1960  Date of evaluation: 2/16/2023      Procedure Summary     Date: 02/16/23 Room / Location: [address]    Anesthesia Start: 1601 Anesthesia Stop: 2338    Procedure: OPEN REDUCTION INTERNAL FIXATION LEFT DISTAL RADIUS-MARY (Left: Wrist) Diagnosis:       Closed fracture of distal end of left radius, unspecified fracture morphology, initial encounter      (S52.502A LEFT DISTAL RADIUS FRACTURE)    Surgeons: Kel Parker MD Responsible Provider: Ana Lozada MD    Anesthesia Type: general ASA Status: 3          Anesthesia Type: No value filed.     Yola Phase I: Yola Score: 10    Yola Phase II: Yola Score: 10      Anesthesia Post Evaluation    Patient location during evaluation: PACU  Patient participation: complete - patient participated  Level of consciousness: awake and alert  Airway patency: patent  Nausea & Vomiting: no nausea and no vomiting  Complications: no  Cardiovascular status: hemodynamically stable  Respiratory status: acceptable  Hydration status: stable  Multimodal analgesia pain management approach

## 2023-02-16 NOTE — H&P
Preoperative H&P Update    The patient's History and Physical in the medical record from 2/16/2023 was reviewed by me today. Past Medical History:   Diagnosis Date    Allergic rhinitis, cause unspecified     Cancer ([address]     left breast    Generalized osteoarthrosis, involving multiple sites     Pure hypercholesterolemia     Pure hyperglyceridemia     Type II or unspecified type diabetes mellitus without mention of complication, uncontrolled     Unspecified essential hypertension      Past Surgical History:   Procedure Laterality Date    CHOLECYSTECTOMY, LAPAROSCOPIC N/A 12/19/2018    LAPAROSCOPIC CHOLECYSTECTOMY; CHOLANGIOGRAM performed by Lili Vera MD at [facility]      colonoscopy ordered 6/2010; 5/2011; 11/2011, recommended again and refused at this time on 3/30/12 & 4/2012     COLONOSCOPY N/A 11/29/2018    COLONOSCOPY POLYPECTOMY SNARE/COLD BIOPSY performed by Sabra Abreu MD at [address] Left 04/18/2019    Skin sparing mastectomy Sentinal Lymph node Bx Tissue expander    NASAL SEPTUM SURGERY      TONSILLECTOMY      WRIST SURGERY Right      No current facility-administered medications on file prior to encounter.      Current Outpatient Medications on File Prior to Encounter   Medication Sig Dispense Refill    clindamycin (CLEOCIN) 300 MG capsule Take 1 capsule by mouth 3 times daily for 7 days 21 capsule 0    montelukast (SINGULAIR) 10 MG tablet TAKE 1 TABLET BY MOUTH EVERY NIGHT AS NEEDED FOR ALLERGIES 30 tablet 5    valsartan (DIOVAN) 320 MG tablet TAKE 1 TABLET BY MOUTH EVERY DAY 90 tablet 3    loratadine (CLARITIN) 10 MG tablet TAKE 1 TABLET BY MOUTH EVERY DAY 90 tablet 3    rosuvastatin (CRESTOR) 10 MG tablet TAKE ONE TABLET BY MOUTH DAILY 90 tablet 1    omeprazole (PRILOSEC) 40 MG delayed release capsule TAKE 1 CAPSULE BY MOUTH EVERY DAY 30 capsule 5    dulaglutide (TRULICITY) 1.5 QV/2.5XB SC injection Inject 0.5 mLs into the skin once a week INJECT 1.5 MG UNDER THE SKIN ONCE WEEKLY 4 Adjustable Dose Pre-filled Pen Syringe 3    insulin glargine (LANTUS SOLOSTAR) 100 UNIT/ML injection pen Inject 60 Units into the skin nightly 15 mL 5    glipiZIDE (GLUCOTROL) 5 MG tablet Take 1 tablet by mouth 2 times daily (before meals) 180 tablet 3    metFORMIN (GLUCOPHAGE) 850 MG tablet Take 1 tablet by mouth 2 times daily (with meals) 180 tablet 3    Insulin Pen Needle (PEN NEEDLES 3/16\") 31G X 5 MM MISC 1 each by Does not apply route daily 100 each 3    Azelastine HCl 137 MCG/SPRAY SOLN SPRAY 2 SPRAYS IN EACH NOSTRIL DAILY 30 mL 5    blood glucose monitor strips Test 2 times a day & as needed for symptoms of irregular blood glucose 100 strip 5    blood glucose monitor kit and supplies Check sugars fasting and 2 hours after largest meal. 1 kit 0    Lancets MISC 1 each by Does not apply route 2 times daily 100 each 5    bisoprolol-hydroCHLOROthiazide (ZIAC) 5-6.25 MG per tablet Take 1 tablet by mouth daily 30 tablet 3    JARDIANCE 25 MG tablet TAKE ONE TABLET BY MOUTH DAILY 30 tablet 5    fluticasone (FLONASE) 50 MCG/ACT nasal spray SPRAY TWO SPRAYS IN EACH NOSTRIL ONCE DAILY 3 each 3    sodium chloride (OCEAN, BABY AYR) 0.65 % nasal spray 1 spray by Nasal route as needed for Congestion      blood glucose monitor kit and supplies Test BID times a day & as needed for symptoms of irregular blood glucose. 1 kit 0    blood glucose monitor strips Test BID  times a day & as needed for symptoms of irregular blood glucose.  100 strip 5    vitamin C (ASCORBIC ACID) 500 MG tablet Take 500 mg by mouth daily      CALCIUM PO Take by mouth 2 times daily      Denosumab (PROLIA SC) Inject into the skin every 6 months      anastrozole (ARIMIDEX) 1 MG tablet Take 1 mg by mouth nightly      Simethicone 180 MG CAPS Take by mouth 2 times daily as needed      Cholecalciferol (VITAMIN D3) 2000 units CAPS Take by mouth daily       Probiotic Product (PROBIOTIC DAILY PO) Take by mouth daily Allergies   Allergen Reactions    Cefdinir Other (See Comments)     *Cephalosporins*: Pruritus. Erythromycin Swelling     Throat swelling. Ezetimibe-Simvastatin Other (See Comments)     Muscle cramps. Lisinopril Other (See Comments)     Cough. Niacin And Related Other (See Comments)     Niacin (antihyperlipidemic) *Antihyperlipidmics*: S.E.    Other Other (See Comments)     darvocet    Pcn [Penicillins] Hives    Zithromax [Azithromycin] Swelling     Throat swelling. I reviewed the HPI, medications, allergies, reason for surgery, diagnosis and treatment plan and there has been no change. The patient was evaluated by me today. Physical exam findings for this update include:    Vitals:    02/16/23 0656   BP: 121/69   Pulse: (!) 101   Resp: 16   Temp: 97.4 °F (36.3 °C)   SpO2: 95%     Airway is intact  Chest: chest clear, no wheezing, rales, normal symmetric air entry, no tachypnea, retractions or cyanosis  Heart: regular rate and rhythm ; heart sounds normal  Findings on exam of the body region where surgery is to be performed include:  Left distal radius fracture.     Electronically signed by Isela Sher MD on 2/16/2023 at 7:03 AM

## 2023-02-16 NOTE — TELEPHONE ENCOUNTER
Patient is experiencing swelling in fingers, also has numbness, asking if this is normal. Post op sx today. Patient is also needing to know how to use ice since the hand is wrapped, should she apply to her fingers? Will reach to Dr. Mayank Brannon clinic.     Please Advise  [phone]

## 2023-02-16 NOTE — PROGRESS NOTES
PT moved to phase II. Pt alert and oriented. Room air. Vss. Pt stable. Tolerating po. D/c instructions reviewed with family/pt. Verbalized understanding.

## 2023-02-16 NOTE — CONSULTS
[facility] Orthopedic Surgery  Consult Note         This patient is seen in consultation at the request of Dr Yoan Scott MD    Reason for Consult:  Left wrist and bilateral foot pain/ markedly displaced distal radius fracture and bilateral great toe and left 2nd toe fracture. CHIEF COMPLAINT:  Left wrist and bilateral foot pain/ fall. History Obtained From:  patient, mother, electronic medical record    HISTORY OF PRESENT ILLNESS:    Ms. Rose Webster is a 60 y.o.  female right handed who presents today for evaluation of a left wrist and bilateral injury. The patient reports that this injury occurred when she fell. She was first seen and evaluated in , when she was x-rayed and splinted, and asked to f/u with Orthopedics. The patient denies any other injuries. Rates pain a 8/10 VAS moderate, sharp, constant and show no change. Movement makes the pain worse, the splint and resting makes the pain better. Alleviating factors rest. No numbness or tingling sensation.       Past Medical History:        Diagnosis Date    Allergic rhinitis, cause unspecified     Cancer ([facility] [address]     left breast    Generalized osteoarthrosis, involving multiple sites     Pure hypercholesterolemia     Pure hyperglyceridemia     Type II or unspecified type diabetes mellitus without mention of complication, uncontrolled     Unspecified essential hypertension        Past Surgical History:        Procedure Laterality Date    CHOLECYSTECTOMY, LAPAROSCOPIC N/A 12/19/2018    LAPAROSCOPIC CHOLECYSTECTOMY; CHOLANGIOGRAM performed by Luther Kc MD at [address] [place] [address]      colonoscopy ordered 6/2010; 5/2011; 11/2011, recommended again and refused at this time on 3/30/12 & 4/2012     COLONOSCOPY N/A 11/29/2018    COLONOSCOPY POLYPECTOMY SNARE/COLD BIOPSY performed by Frank Tadeo MD at [facility] 04/18/2019    Skin sparing mastectomy Sentinal Lymph node Bx Tissue expander    NASAL SEPTUM SURGERY      TONSILLECTOMY      WRIST SURGERY Right        Medications prior to admission:   Prior to Admission medications    Medication Sig Start Date End Date Taking?  Authorizing Provider   clindamycin (CLEOCIN) 300 MG capsule Take 1 capsule by mouth 3 times daily for 7 days 2/9/23 2/16/23  Savanah Jj MD   montelukast (SINGULAIR) 10 MG tablet TAKE 1 TABLET BY MOUTH EVERY NIGHT AS NEEDED FOR ALLERGIES 12/20/22   Salazar Smith, DO   valsartan (DIOVAN) 320 MG tablet TAKE 1 TABLET BY MOUTH EVERY DAY 12/13/22   Salazar Smith, DO   loratadine (CLARITIN) 10 MG tablet TAKE 1 TABLET BY MOUTH EVERY DAY 12/13/22   Salazar Smith, DO   rosuvastatin (CRESTOR) 10 MG tablet TAKE ONE TABLET BY MOUTH DAILY 11/22/22   Salazar Smith, DO   omeprazole (PRILOSEC) 40 MG delayed release capsule TAKE 1 CAPSULE BY MOUTH EVERY DAY 11/11/22   Salazar Smith, DO   dulaglutide (TRULICITY) 1.5 LP/4.3SA SC injection Inject 0.5 mLs into the skin once a week INJECT 1.5 MG UNDER THE SKIN ONCE WEEKLY 11/7/22   Salazar Smith, DO   insulin glargine (LANTUS SOLOSTAR) 100 UNIT/ML injection pen Inject 60 Units into the skin nightly 11/7/22   Salazar Smith, DO   glipiZIDE (GLUCOTROL) 5 MG tablet Take 1 tablet by mouth 2 times daily (before meals) 11/7/22   Salazar Smith DO   metFORMIN (GLUCOPHAGE) 850 MG tablet Take 1 tablet by mouth 2 times daily (with meals) 11/7/22   Salazar Smith, DO   Insulin Pen Needle (PEN NEEDLES 3/16\") 31G X 5 MM MISC 1 each by Does not apply route daily 11/7/22   Salazar Smith DO   Azelastine HCl 137 MCG/SPRAY SOLN SPRAY 2 SPRAYS IN [facility] NOSTRIL DAILY 8/8/22   Jacy Atkins, DO   blood glucose monitor strips Test 2 times a day & as needed for symptoms of irregular blood glucose 6/6/22   Salazar Smith DO   blood glucose monitor kit and supplies Check sugars fasting and 2 hours after largest meal. 6/6/22   Salazar Smith, DO   Lancets MISC 1 each by Does not apply route 2 times daily 6/6/22   Bart Gaines, DO   bisoprolol-hydroCHLOROthiazide [facility]) 5-6.25 MG per tablet Take 1 tablet by mouth daily 6/6/22   Bart Gaines, DO   JARDIANCE 25 MG tablet TAKE ONE TABLET BY MOUTH DAILY 3/30/22   Bart Gaines, DO   fluticasone [facility]) 50 MCG/ACT nasal spray SPRAY TWO SPRAYS IN EACH NOSTRIL ONCE DAILY 3/2/22   Bart Gaines, DO   sodium chloride (OCEAN, BABY AYR) 0.65 % nasal spray 1 spray by Nasal route as needed for Congestion    Historical Provider, MD   blood glucose monitor kit and supplies Test BID times a day & as needed for symptoms of irregular blood glucose. 4/26/19   Bart Gaines, DO   blood glucose monitor strips Test BID  times a day & as needed for symptoms of irregular blood glucose.  4/26/19   Bart Gaines DO   vitamin C (ASCORBIC ACID) 500 MG tablet Take 500 mg by mouth daily    Historical Provider, MD   CALCIUM PO Take by mouth 2 times daily    Historical Provider, MD   Denosumab (PROLIA SC) Inject into the skin every 6 months    Historical Provider, MD   anastrozole (ARIMIDEX) 1 MG tablet Take 1 mg by mouth nightly    Historical Provider, MD   Simethicone 180 MG CAPS Take by mouth 2 times daily as needed    Historical Provider, MD   Cholecalciferol (VITAMIN D3) 2000 units CAPS Take by mouth daily     Historical Provider, MD   Probiotic Product (PROBIOTIC DAILY PO) Take by mouth daily     Historical Provider, MD       Current Medications:   Current Facility-Administered Medications: sodium chloride flush 0.9 % injection 5-40 mL, 5-40 mL, IntraVENous, 2 times per day  sodium chloride flush 0.9 % injection 5-40 mL, 5-40 mL, IntraVENous, PRN  0.9 % sodium chloride infusion, 25 mL, IntraVENous, PRN  fentaNYL (SUBLIMAZE) injection 25 mcg, 25 mcg, IntraVENous, Q5 Min PRN  HYDROmorphone (DILAUDID) injection 0.5 mg, 0.5 mg, IntraVENous, Q5 Min PRN  ondansetron (ZOFRAN) injection 4 mg, 4 mg, IntraVENous, Once PRN  prochlorperazine (COMPAZINE) injection 5 mg, 5 mg, IntraVENous, Once PRN  labetalol (NORMODYNE;TRANDATE) injection 10 mg, 10 mg, IntraVENous, Q15 Min PRN **OR** hydrALAZINE (APRESOLINE) injection 10 mg, 10 mg, IntraVENous, Q15 Min PRN    Allergies:  Cefdinir, Erythromycin, Ezetimibe-simvastatin, Lisinopril, Niacin and related, Other, Pcn [penicillins], and Zithromax [azithromycin]    Social History     Socioeconomic History    Marital status: Single     Spouse name: Not on file    Number of children: Not on file    Years of education: Not on file    Highest education level: Not on file   Occupational History    Not on file   Tobacco Use    Smoking status: Never    Smokeless tobacco: Never   Vaping Use    Vaping Use: Never used   Substance and Sexual Activity    Alcohol use: No     Alcohol/week: 0.0 standard drinks    Drug use: No    Sexual activity: Not on file   Other Topics Concern    Not on file   Social History Narrative    Not on file     Social Determinants of Health     Financial Resource Strain: Not on file   Food Insecurity: Not on file   Transportation Needs: Not on file   Physical Activity: Not on file   Stress: Not on file   Social Connections: Not on file   Intimate Partner Violence: Not on file   Housing Stability: Not on file       Family History:  Family History   Problem Relation Age of Onset    Asthma Maternal Grandmother     Hypertension Maternal Grandmother     Breast Cancer Maternal Grandmother 76    Heart Disease Maternal Grandfather     Hypertension Paternal Grandmother     Heart Disease Paternal Grandfather     Hypertension Paternal Grandfather     COPD Father        REVIEW OF SYSTEMS:   CONSTITUTIONAL: Denies unexplained weight loss, fevers, chills or fatigue  NEUROLOGICAL: Denies unsteady gait or progressive weakness    PSYCHOLOGICAL: Denies anxiety, depression   SKIN: Denies skin changes, delayed healing, rash, itching   HEMATOLOGIC: Denies easy bleeding or bruising  ENDOCRINE: Denies excessive thirst, urination, heat/cold  RESPIRATORY: Denies current dyspnea, cough  CARDIOVASCULAR: Negative for chest pain at this time. EYES: Negative for photophobia and visual disturbance. ENT:  Negative for rhinorrhea, epistaxis, sore throat, or hearing loss. GI: Denies nausea, vomiting, diarrhea   : Denies bowel or bladder issues   MUSCULOSKELETAL: Left wrist and bilateral foot pain. All other ROS reviewed in chart or with patient or family and are grossly negative. PHYSICAL EXAMINATION:  Ms. Lydia Bailon is a very pleasant 60 y.o. female who seen today in no acute distress, awake, alert, and oriented. She is well nourished and groomed. Patient with normal affect. Body mass index is 25.61 kg/m². . Skin warm and dry. Resting respiratory rate is 16. Resp deep and easy. Pulse is with regular rate and rhythm    /69   Pulse (!) 101   Temp 97.4 °F (36.3 °C) (Temporal)   Resp 16   Ht 5' 2\" (1.575 m)   Wt 140 lb (63.5 kg)   SpO2 95%   BMI 25.61 kg/m²        Airway is intact  Chest: chest clear, no wheezing, rales, normal symmetric air entry, no tachypnea, retractions or cyanosis  Heart: regular rate and rhythm ; heart sounds normal   Hearing intact, pupil equal and reactive bilateral  Lymphatics; No groin or axillary enlarged lymph nodes. Neck; No swelling  Abdomen; soft, non distended. MUSCULOSKELETAL:   On evaluation of her left upper extremity, there is moderate deformity. There is moderate swelling and moderate ecchymosis. She is tender to palpation over the distal radius, and otherwise nontender over the remainder of the extremity. Range of motion is decreased secondary to pain over the left wrist, but no mechanical block. The skin overlying the left wrist with volar deep abrasion. Distal pulses are 2+ and symmetric bilaterally. Sensation is grossly intact to light touch and symmetric bilaterally.     Examination of both feet and ankles showing a decreased range of motion of the left foot first (great toe) and second toe compare to the other side because of pain. There is moderate swelling that can be seen, as well as ecchymosis over first (great toe) and second toe of the left foot and great toe right foot. She has intact sensation and good pedal pulses. She has significant tenderness on deep palpation over the bilateral first (great toe) and left second toe proximal and distal phalanx with open wound c/w open fracture left great toe. NEUROLOGIC:   Sensory:    Touch:                     Right Upper Extremity:  normal                   Left Upper Extremity:  normal                  Right Lower Extremity:  normal                  Left Lower Extremity:  normal              DATA:    CBC:   Lab Results   Component Value Date/Time    WBC 6.4 03/07/2022 09:35 AM    RBC 4.35 03/07/2022 09:35 AM    HGB 13.3 03/07/2022 09:35 AM    HCT 39.2 03/07/2022 09:35 AM    MCV 90.1 03/07/2022 09:35 AM    MCH 30.5 03/07/2022 09:35 AM    MCHC 33.9 03/07/2022 09:35 AM    RDW 13.5 03/07/2022 09:35 AM     03/07/2022 09:35 AM    MPV 9.4 03/07/2022 09:35 AM     WBC:    Lab Results   Component Value Date/Time    WBC 6.4 03/07/2022 09:35 AM     PT/INR:  No results found for: PROTIME, INR  PTT:  No results found for: APTT[APTT    IMAGING: Xrays dated 2/9/2023, 3 views of left wrist were reviewed, and showed moderately displaced distal radius intraarticular fracture. Benjiman Bang were reviewed, dated 2/9/2023, 3 views of the bilateral foot, and showed a left first (great toe) proximal and distal phalanx moderately displaced fracture, left 2nd toe proximal phalanx base fracture and right great toe proximal phalanx head fracture. Muna Mcarthur IMPRESSION:  1- Left wrist pain/ markedly displaced distal radius fracture. 2- bilateral great toe (left is open fracture) and left 2nd toe fracture.       PLAN:  Regarding her bilateral foot toes fracture,  I discussed with Mario Chopra the treatment options and that the overall alignment of this fracture is good and we can try to treat this non-operatively with wound care and WBAT. We discussed the risk of nonunion and or malunion. We applied a Drsg today in the OR and instructed her in care. Regarding her left wrist,  I discussed with Mario hCopra the overall alignment of the fracture and treatment options including both surgical and non-surgical treatment, and that my recommendation is an open reduction and internal fixation given the amount of displacement and comminution of the fracture. I discussed the risks and benefits of surgery with the patient, including but not limited to infection, bleeding, pain, injury to nerves or blood vessels failure of the surgery and need for additional surgery. All the patient's questions were answered. We discussed an expected post-operative course. She  is understanding of this and wishes to proceed. Surgery today. Thank you very much for the kind consultation and allowing me to participate in this patient's care. I will continue to keep you apprised of her progress.         Bandar Orozco MD   2/16/2023  7:03 AM

## 2023-02-16 NOTE — TELEPHONE ENCOUNTER
Other PATIENT'S MOTHER IS CALLING IN AND PATIENT IS EXPERIENCING NUMBNESS AND WANTED TO KNOWIF THAT WAS NORMAL. PATIENT WAS TRANSFERRED TO TRIAGE TEAM

## 2023-02-16 NOTE — PROGRESS NOTES
Pt received into bay 10 from OR. Pt drowsy, yet arousable. Vss. Pt stable. O2 at 6L/simple mask. Resp easy, unlabored. Report obtained. Left wrist dressing with ace bandage, c/d/I. Elevated with pillow. Ice pack applied at site. Cap refill <3sec. Appears comfortable. Will monitor.

## 2023-02-16 NOTE — DISCHARGE INSTRUCTIONS
Post op instruction:  1- D/C home  2- Dx Left distal radius fracture.  3- NWB left wrist  4- Elevation surgical site, with ice  5- Keep splint dry and clean  6- F/U in 2 weeks.       Gabriela Henderson MD, 2/16/2023      ORTHOPEDIC/PODIATRY DISCHARGE INSTRUCTIONS    Follow your surgeons instructions.  Make follow-up appointment.  Observe operative area for signs of excessive bleeding such as a slow general ooze that saturates the dressing or bright red bleeding. In either case, apply pressure to the area and elevate if possible and call your surgeon right away.  Observe the affected extremity for circulation or nerve impairment such as a change in color, numbness, tingling, coldness or increased pain. If any of these symptoms are present call your surgeon.  Observe operative site for any signs of infection such as increased pain, redness, fever greater than 101 degrees, swelling, foul odor or drainage. Contact surgeon if any of these symptoms are present.  If you become short of breath call your surgeon or go to the nearest emergency room.  Remove dressing if directed by surgeon. Leave steristips or sutures or staples in place.  You may loosen your ace wrap if it feels too tight, or if you have severe pain, or if it has swelling.  Elevate extremity as directed by surgeon.  You may shower when directed by surgeon.  Use ice pack as directed by surgeon. Do not use heat.  Avoid stress to suture line such as pulling, pushing or tugging.  Use sling as instructed by surgeon.   Take medications as ordered.Take pain medication with food.Do not drive or operate machinery while taking narcotics.  Call your surgeon for any questions or problems.     ANESTHESIA DISCHARGE INSTRUCTIONS    Wear your seatbelt home.  You are under the influence of drugs-do not drink alcohol,drive,operate machinery,or make any important decisions or sign any legal documentsfor 24 hours  A responsible adult needs to be with you for 24 hours.  You may  experience lightheadedness,dizziness,or sleepiness following surgery. Rest at home today- increase activity as tolerated. Progress slowly to a regular diet unless your physician has instructed you otherwise. Drink plenty of water. If nausea becomes a problem call your physician. Coughing,sore throat,and muscle aches are other side effects of anesthesia,and should improve with time. Do not drive,operate machinery while taking narcotics.

## 2023-02-16 NOTE — TELEPHONE ENCOUNTER
General Question     Subject: Pt JUST GOT HOME FROM SX - CAN SHE HAVE A PAIN PILL NOW? Patient and /or Facility Request: Roger Chopra  Contact Number: [phone]    THEY AREN'T SURE WHEN/IF THE Pt RCV'D PAIN MED. CAN MOTHER ROGER GIVE HER PAIN MED NOW?

## 2023-02-16 NOTE — TELEPHONE ENCOUNTER
Spoke with patient's mother. Educated on ice and elevation to help improve the numbness. Advised this is normal post operatively for this surgery and will take a few weeks to resolve.

## 2023-02-17 NOTE — OP NOTE
[address]                     [address] [facility], [address] [facility]                                OPERATIVE REPORT    PATIENT NAME: Venecia Horton                     :        1960  MED REC NO:   [id number]                          ROOM:  ACCOUNT NO:   [de-identified]                           ADMIT DATE: 2023  PROVIDER:     Mena Calvo MD    DATE OF PROCEDURE:  2023    PREOPERATIVE DIAGNOSIS:  Left distal radius three-part intra-articular  comminuted displaced fracture. POSTOPERATIVE DIAGNOSIS:  Left distal radius three-part intra-articular  comminuted displaced fracture. OPERATION PERFORMED:  Open treatment of left distal radius three-part  intra-articular fracture with open reduction and internal fixation. SURGEON:  Mena Calvo MD    ASSISTANT:  Artis Mane CNP    ANESTHESIA:  General anesthesia. ESTIMATED BLOOD LOSS:  Minimal.    COMPLICATIONS:  None. TOURNIQUET:  Left upper arm, 250 mmHg. IMPLANTS USED:  [place] Titanium intermediate volar locking plate with  three proximal screws, two of which were locking and seven distal  locking screws. INDICATIONS:  This is a 70-year-old white female, right-hand dominant,  who sustained a fall with the left wrist and bilateral foot injuries. She was seen initially at [facility] when she was found to  have a displaced intra-articular distal radius fracture on the left side  as well as bilateral great toe fracture and the left second toe  fracture. Orthopedic consultation was placed and she was sent home on  p.o. antibiotics. The patient was then seen for surgical consultation. All risks, benefits, and alternatives were discussed with the patient  and she agreed to proceed with the surgical fixation. DESCRIPTION OF THE PROCEDURE:  The patient's left wrist was marked. She  received 900 mg of clindamycin IV preoperatively.   The patient was then  brought to the operating room and underwent general anesthesia. A  well-padded tourniquet was placed to the left upper arm. The left upper  extremity was then prepped and draped in a regular sterile routine  fashion. A time-out was called, confirming the patient name, the site,  and the procedure. Esmarch was used for exsanguination. The tourniquet was inflated to 250  mmHg. A volar approach was performed. An incision was made over the  FCR tendon and the tendon sheath was opened. We then incised the  pronator quadratus muscle with the cautery. We exposed the fracture and  it was found to be markedly displaced three-part intra-articular  fracture. We carefully were able to reduce it anatomically. The  fracture was displaced volarly and we elected to used the plate as a  reduction tool. We elected to use an intermediate but longer plate to  give us more purchase in the shaft. We secured the plate with a K-wire  distally. We then put one non-locking screw within the shaft and then  buttress the fracture and reduced it anatomically. We added two more  locking screws to secure it in place. At this point, we added several  more locking screws distally. Mini C-arm confirmed the anatomic  reduction and placement of all the screws. At this point, we let the  tourniquet down and hemostasis was secured. We irrigated the incision  copiously with normal saline mixed with gentamicin. We then closed the  subcutaneous with a 3-0 Vicryl and then the skin with a 4-0 Monocryl. Steri-Strips were then applied. Dressing was then applied in the form  of Xeroform, 4x4s, and sterile Webril, and a splint was applied. The patient tolerated the procedure well and was taken to the recovery  in a stable condition. Aime Giang CNP was 1st Assist given the nature of the procedure that needed advanced assistance.  She assisted in all aspect of the procedure, including but limited to draping in a sterile fashion, exposure of surgical area, controlling bleeding, retracting and protecting important structures, achieve and maintain bone reduction and surgical wound closure with dressing application. POSTOPERATIVE PLAN:  The patient will be discharged home. She will be  nonweightbearing, but was encouraged to start immediate range of motion  of the fingers. She will continue on p.o. antibiotics for her bilateral  toe fractures that will be treated nonsurgically.         Olvin Ma MD    D: 02/16/2023 12:39:40       T: 02/17/2023 0:05:39     [id number]  Job#: [id number]     Doc#: [id number]    CC:

## 2023-02-20 DIAGNOSIS — S52.572A OTHER CLOSED INTRA-ARTICULAR FRACTURE OF DISTAL END OF LEFT RADIUS, INITIAL ENCOUNTER: Primary | ICD-10-CM

## 2023-02-20 RX ORDER — CLINDAMYCIN HYDROCHLORIDE 300 MG/1
300 CAPSULE ORAL 4 TIMES DAILY
Qty: 20 CAPSULE | Refills: 0 | Status: SHIPPED | OUTPATIENT
Start: 2023-02-20 | End: 2023-02-25

## 2023-02-20 RX ORDER — HYDROCODONE BITARTRATE AND ACETAMINOPHEN 5; 325 MG/1; MG/1
1 TABLET ORAL EVERY 6 HOURS PRN
Qty: 20 TABLET | Refills: 0 | Status: SHIPPED | OUTPATIENT
Start: 2023-02-20 | End: 2023-02-25

## 2023-02-20 NOTE — TELEPHONE ENCOUNTER
Patient's mother called. She stated a nurse at the hospital told her pain meds and Clindamycin were being sent to their pharmacy. No record of these being ordered by [facility] day of surgery and patient's mother states pharmacy has no prescriptions on file to fill. Notified her I will get a request sent to Dr China Saez for meds to be sent today so they can pick them up tomorrow when they leave for her oncologist appointment. [facility] [place] is the requested pharmacy.

## 2023-02-24 ENCOUNTER — TELEPHONE (OUTPATIENT)
Dept: INTERNAL MEDICINE CLINIC | Age: 63
End: 2023-02-24

## 2023-02-24 RX ORDER — AZELASTINE HYDROCHLORIDE 137 UG/1
SPRAY, METERED NASAL
Qty: 30 ML | Refills: 5 | Status: SHIPPED | OUTPATIENT
Start: 2023-02-24

## 2023-02-24 NOTE — TELEPHONE ENCOUNTER
----- Message from Mulugetalisa Cooks sent at 2/24/2023 11:48 AM EST -----  Subject: Refill Request    QUESTIONS  Name of Medication? Azelastine HCl 137 MCG/SPRAY SOLN  Patient-reported dosage and instructions? 3 to 4 times a day  How many days do you have left? 0  [id number]  Pharmacy phone number (if available)? [phone]  ---------------------------------------------------------------------------  --------------  Gay PAZ  What is the best way for the office to contact you? OK to leave message on   voicemail  Preferred Call Back Phone Number? [phone]  ---------------------------------------------------------------------------  --------------  SCRIPT ANSWERS  Relationship to Patient?  Self

## 2023-02-27 ENCOUNTER — TELEPHONE (OUTPATIENT)
Dept: INTERNAL MEDICINE CLINIC | Age: 63
End: 2023-02-27

## 2023-02-27 RX ORDER — VALACYCLOVIR HYDROCHLORIDE 1 G/1
2000 TABLET, FILM COATED ORAL 2 TIMES DAILY
Qty: 20 TABLET | Refills: 0 | Status: SHIPPED | OUTPATIENT
Start: 2023-02-27

## 2023-02-27 NOTE — TELEPHONE ENCOUNTER
----- Message from [place] sent at 2/27/2023  9:52 AM EST -----  Subject: Refill Request    QUESTIONS  Name of Medication? Other - Zovirax 15 MG  Patient-reported dosage and instructions? three times diaily   How many days do you have left? 0  [id number]  Pharmacy phone number (if available)? [phone]  Additional Information for Provider? Pt has some fever blister on lips. Pt   has had this for about a week.   ---------------------------------------------------------------------------  --------------  CALL BACK INFO  What is the best way for the office to contact you? OK to leave message on   voicemail  Preferred Call Back Phone Number? [phone]  ---------------------------------------------------------------------------  --------------  SCRIPT ANSWERS  Relationship to Patient?  Self

## 2023-03-02 ENCOUNTER — OFFICE VISIT (OUTPATIENT)
Dept: ORTHOPEDIC SURGERY | Age: 63
End: 2023-03-02

## 2023-03-02 VITALS — HEIGHT: 62 IN | BODY MASS INDEX: 25.76 KG/M2 | WEIGHT: 140 LBS

## 2023-03-02 DIAGNOSIS — S52.572A OTHER CLOSED INTRA-ARTICULAR FRACTURE OF DISTAL END OF LEFT RADIUS, INITIAL ENCOUNTER: Primary | ICD-10-CM

## 2023-03-02 DIAGNOSIS — S92.421A DISPLACED FRACTURE OF DISTAL PHALANX OF RIGHT GREAT TOE, INITIAL ENCOUNTER FOR CLOSED FRACTURE: ICD-10-CM

## 2023-03-02 DIAGNOSIS — S92.422B DISPLACED FRACTURE OF DISTAL PHALANX OF LEFT GREAT TOE, INITIAL ENCOUNTER FOR OPEN FRACTURE: ICD-10-CM

## 2023-03-02 DIAGNOSIS — S92.512A CLOSED DISPLACED FRACTURE OF PROXIMAL PHALANX OF LESSER TOE OF LEFT FOOT, INITIAL ENCOUNTER: ICD-10-CM

## 2023-03-02 NOTE — PROGRESS NOTES
DIAGNOSIS:    1-Left distal radius 3 parts intra articular displaced fracture, status post ORIF. 2-Left foot great toe distal and proximal phalanx open fracture and second toe proximal phalanx fracture, with traumatic left great toenail removal  3-Right foot great toe proximal phalanx fracture    DATE OF SURGERY: 2/16/2023. HISTORY OF PRESENT ILLNESS:  Ms. [de-identified] 60 y.o.  female right handed returns today  for 2 weeks postoperative visit. The patient denies any significant pain in the left wrist.  Rates pain a 4/10 VAS moderate, aching, intermittent and are improving. Aggravating factors movement. Alleviating factors rest.  She is also here for her bilateral toe fractures. Her left foot great toe with an open fracture with a traumatic removal of the left and nail with sutures. She has completed antibiotics. No numbness or tingling sensation. No fever or Chills. She  is in a splint. Denies smoking. PHYSICAL EXAMINATION:  The incision is completely healed left distal radius. There are scabbed wounds in the right first and second toes and the left first and second toes. The sutures in the left foot great toe are intact. No signs of any erythema or drainage. She  has no pain with the active or passive range of motion of the left wrist, but decrease ROM. She  has intact sensation, distally, and she  is neurovascularly intact. IMAGING:  Three views left wrist taken today in the office showed anatomic alignment of left distal radius, plate and screws in good position, no loosening. IMPRESSION:  2 weeks out from  1-Left distal radius 3 parts intra articular displaced fracture, status post ORIF. 2-Left foot great toe distal and proximal phalanx open fracture and second toe proximal phalanx fracture, with traumatic left great toenail removal  3-Right foot great toe proximal phalanx fracture    PLAN:  I have told the patient to work on ROM, as well as strengthening exercises.  A removable forearm brace applied. No heavy impact activities. I discussed with the patient the risk and benefits of suture removal the left great toe and she agreed to proceed. Tolerated well. Keep the wounds on the feet dry and intact. She can be weightbearing bilateral feet in her postop shoe. The patient will come back for a follow up in 6 weeks. At that time, we will take 3 views of the left wrist and bilateral foot x-rays. PT if needed then. As this patient has demonstrated risk factors for osteoporosis, such as age greater than [de-identified] years and evidence of a fracture, I have referred the patient back to the primary care physician for evaluation for osteoporosis, including consideration for DEXA scanning, if this is felt to be clinically indicated. The patient is advised to contact the primary care physician to follow-up for further evaluation. Procedures    Aleida Padgett Titan Wrist and Forearm Brace     Patient was prescribed a Aleida Padgett Wrist and Forearm Brace. The left hand will require stabilization / immobilization from this semi-rigid / rigid orthosis to improve their function. The orthosis will assist in protecting the affected area, provide functional support and facilitate healing. The patient was educated and fit by a healthcare professional with expert knowledge and specialization in brace application while under the direct supervision of the physician. Verbal and written instructions for the use of and application of this item were provided. They were instructed to contact the office immediately should the brace result in increased pain, decreased sensation, increased swelling or worsening of the condition.          Tori Menon, APRN - CNP

## 2023-03-03 ENCOUNTER — TELEPHONE (OUTPATIENT)
Dept: ORTHOPEDIC SURGERY | Age: 63
End: 2023-03-03

## 2023-03-03 NOTE — TELEPHONE ENCOUNTER
Prescription Refill     Medication Name:  IS Pt SUPPOSED TO GET NEW RX FOR ANTIBIOTICS?   Pharmacy: [facility]  Patient Contact Number:  [phone]     TCOLE ASKED WHEN Pt WAS DONE WITH CURRENT ROUND OF ANTIBIOTICS, AND Pt TOOK HER LAST ONE YESTERDAY.     IS THE Pt SUPPOSED TO GET A NEW RX DUE TO THE LOST TOENAIL, OR IS T NANCIE WAITING?     PLEASE CALL TO ADVISE IF NEW RX WILL BE SENT, OR IF Pt IS TO WAIT @ THE #  ABOVE.

## 2023-03-27 DIAGNOSIS — R09.81 NASAL CONGESTION: ICD-10-CM

## 2023-03-27 DIAGNOSIS — R05.9 COUGH: ICD-10-CM

## 2023-03-27 DIAGNOSIS — R06.7 SNEEZING: ICD-10-CM

## 2023-03-27 DIAGNOSIS — E11.9 TYPE 2 DIABETES MELLITUS WITHOUT COMPLICATION, WITHOUT LONG-TERM CURRENT USE OF INSULIN (HCC): ICD-10-CM

## 2023-03-27 DIAGNOSIS — J30.89 SEASONAL ALLERGIC RHINITIS DUE TO OTHER ALLERGIC TRIGGER: ICD-10-CM

## 2023-03-27 RX ORDER — FLUTICASONE PROPIONATE 50 MCG
SPRAY, SUSPENSION (ML) NASAL
Qty: 112 G | Refills: 0 | Status: SHIPPED | OUTPATIENT
Start: 2023-03-27

## 2023-03-27 RX ORDER — DULAGLUTIDE 1.5 MG/.5ML
INJECTION, SOLUTION SUBCUTANEOUS
Qty: 2 ML | Refills: 0 | Status: SHIPPED | OUTPATIENT
Start: 2023-03-27

## 2023-03-30 RX ORDER — INSULIN GLARGINE 100 [IU]/ML
INJECTION, SOLUTION SUBCUTANEOUS
Qty: 15 ML | Refills: 5 | Status: SHIPPED | OUTPATIENT
Start: 2023-03-30

## 2023-04-13 DIAGNOSIS — E11.9 TYPE 2 DIABETES MELLITUS WITHOUT COMPLICATION, WITHOUT LONG-TERM CURRENT USE OF INSULIN (HCC): ICD-10-CM

## 2023-04-14 RX ORDER — EMPAGLIFLOZIN 25 MG/1
TABLET, FILM COATED ORAL
Qty: 30 TABLET | Refills: 0 | Status: SHIPPED | OUTPATIENT
Start: 2023-04-14

## 2023-04-20 ENCOUNTER — OFFICE VISIT (OUTPATIENT)
Dept: ORTHOPEDIC SURGERY | Age: 63
End: 2023-04-20

## 2023-04-20 VITALS — BODY MASS INDEX: 25.76 KG/M2 | WEIGHT: 140 LBS | HEIGHT: 62 IN

## 2023-04-20 DIAGNOSIS — S92.422B DISPLACED FRACTURE OF DISTAL PHALANX OF LEFT GREAT TOE, INITIAL ENCOUNTER FOR OPEN FRACTURE: ICD-10-CM

## 2023-04-20 DIAGNOSIS — S52.572A OTHER CLOSED INTRA-ARTICULAR FRACTURE OF DISTAL END OF LEFT RADIUS, INITIAL ENCOUNTER: Primary | ICD-10-CM

## 2023-04-20 DIAGNOSIS — S92.421A DISPLACED FRACTURE OF DISTAL PHALANX OF RIGHT GREAT TOE, INITIAL ENCOUNTER FOR CLOSED FRACTURE: ICD-10-CM

## 2023-04-20 DIAGNOSIS — S92.512A CLOSED DISPLACED FRACTURE OF PROXIMAL PHALANX OF LESSER TOE OF LEFT FOOT, INITIAL ENCOUNTER: ICD-10-CM

## 2023-04-20 NOTE — PROGRESS NOTES
DIAGNOSIS:    1-Left distal radius 3 parts intra articular displaced fracture, status post ORIF. 2-Left foot great toe distal and proximal phalanx open fracture and second toe proximal phalanx fracture, with traumatic left great toenail removal  3-Right foot great toe proximal phalanx fracture    DATE OF SURGERY: 2/16/2023. HISTORY OF PRESENT ILLNESS:  Ms. [de-identified] 60 y.o.  female right handed returns today  for 8 weeks postoperative visit. The patient denies any significant pain in the left wrist.  Rates pain a 0/10 VAS and is doing much better. She is in a brace. She is also here for her bilateral toe fractures. Her left foot great toe with an open fracture with a traumatic removal of the left and nail much improved wound. She has completed antibiotics. No numbness or tingling sensation. No fever or Chills. She is WB in bilateral post op shoes. Denies smoking. PHYSICAL EXAMINATION:  The incision is completely healed left distal radius. The wound in the right first and second toes and the left first and second toes are healing well. SHe is missing the left great toe nail. No signs of any erythema or drainage. She  has no pain with the active or passive range of motion of the left wrist, but decrease ROM. She  has intact sensation, distally, and she  is neurovascularly intact. She is non tender to palpation over the bilateral great toes and the left foot 2nd toe. IMAGING:  Three views left wrist taken today in the office showed anatomic alignment of left distal radius, plate and screws in good position, no loosening. Three views of the bilateral foot taken today in the office were reviewed and showed the left foot great toe proximal and distal phalanx fracture and 2nd toe proximal phalanx fracture. The right foot great toe proximal phalanx fracture. IMPRESSION:  8 weeks out from  1-Left distal radius 3 parts intra articular displaced fracture, status post ORIF.   2-Left foot

## 2023-04-24 ENCOUNTER — HOSPITAL ENCOUNTER (OUTPATIENT)
Dept: OCCUPATIONAL THERAPY | Age: 63
Setting detail: THERAPIES SERIES
Discharge: HOME | End: 2023-04-24
Payer: COMMERCIAL

## 2023-04-24 PROCEDURE — 97165 OT EVAL LOW COMPLEX 30 MIN: CPT

## 2023-04-24 PROCEDURE — 97110 THERAPEUTIC EXERCISES: CPT

## 2023-04-24 PROCEDURE — 97022 WHIRLPOOL THERAPY: CPT

## 2023-04-24 NOTE — PLAN OF CARE
[address], [address] [facility] [place], [address]  Phone: [phone]   Fax: [phone]                                                     Occupational Therapy Certification    Dear CYDNEY Dunn,    We had the pleasure of evaluating the following patient for occupational therapy services at [facility]. A summary of our findings can be found in the initial assessment below. This includes our plan of care. If you have any questions or concerns regarding these findings, please do not hesitate to contact me at the office phone number above. Thank you for the referral.       Referring Practitioner Signature:_______________________________Date:__________________  By signing above (or electronic signature), therapists plan is approved by the referring practitioner      Patient: Amanda Kent   : 1960   MRN: [id number]  Referring Practitioner: CYDNEY Dunn   Evaluation Date: 2023      Medical Diagnosis Information:  L DRF s/p ORIF 23 (S52.572A)                                          Insurance information: [facility], 30 visits each/yr, no copay or pt responsibility     Precautions/ Contra-indications: no heavy impact per 23 ortho note    Latex Allergy:  [x]NO      []YES  Preferred Language for Healthcare:   [x]English       []Other:    C-SSRS Triggered by Intake questionnaire (Past 2 wk assessment ):   [x] No, Questionnaire did not trigger screening.   [] Yes, Patient intake triggered C-SSRS Screening     [] Completed, no further action required. [] Completed, PCP notified via [place]  Reason for Seeking OT Services and Patient Goals: Pt seeking OT services due to L wrist/hand stiffness and weakness following L DRF on 23 sustained during a fall. Pt also fractured both great toes and lost the L great toe nail.  Pt fell outside when her foot caught on

## 2023-04-24 NOTE — FLOWSHEET NOTE
[address] [facility] Occupational Therapy  [address] [place] [address], [address] [place] [address] Drive  Phone: [phone]   Fax: [phone]      Occupational Therapy Daily Treatment Note  Date:  2023    Patient: Gustavo Gillaim   : 1960   MRN: [id number]  Referring Physician: MAKENZIE Stanley-CNP         Medical Diagnosis Information: L DRF s/p ORIF 23 (S52.572A)    Date of Injury:  23 - fall  Date of Surgery:   23 - ORIF                                      Insurance information: [employer], 30 visits each/yr, no copay or pt responsibility     Plan of care sent to provider:      []Faxed   [x]Co-signature    (attempts: 1[] 2[] 3[])       Progress Report: []  Yes  [x]  No     Date Range for reporting period:  Beginnin23  Ending: end of POC    Progress report due (10 Rx/or 30 days whichever is less): visit #10 or 42     Recertification due (POC duration/ or 90 days whichever is less): visit #12 or 23    Visit # Insurance Allowable Auth required? Date Range    TE- 40 units  TA- 40 units  Manual- 40 units  Paraffin- 20 units  Fluido- 20 units  ADL- void []  Yes  []  No 23-7/15/23       Latex Allergy:  [x]No      []Yes  Pacemaker:  [x] No       [] Yes     Preferred Language for Healthcare:   [x]English       []other:    Pain level:  pt reports no \"pain,\" but soreness and stiffness throughout the day; reports intermittent increased pain along radial and ulnar aspect of wrist during use    SUBJECTIVE:  See eval    Functional Disability Index:  Quick DASH: total score- 27, disability score- 36.36%    OBJECTIVE: See eval      RESTRICTIONS/PRECAUTIONS: no heavy impact    Exercises/Interventions: Session initiated with assessment of pt status using subjective and objective measures noted in evaluation, followed by collaborative discussion regarding goals.  8 min AROM during fluidotherapy for soft tissue and circulatory benefits with decreased stiffness

## 2023-04-25 DIAGNOSIS — E11.9 TYPE 2 DIABETES MELLITUS WITHOUT COMPLICATION, WITHOUT LONG-TERM CURRENT USE OF INSULIN (HCC): ICD-10-CM

## 2023-04-25 RX ORDER — DULAGLUTIDE 1.5 MG/.5ML
INJECTION, SOLUTION SUBCUTANEOUS
Qty: 2 ML | Refills: 0 | Status: SHIPPED | OUTPATIENT
Start: 2023-04-25

## 2023-05-10 ENCOUNTER — HOSPITAL ENCOUNTER (OUTPATIENT)
Dept: OCCUPATIONAL THERAPY | Age: 63
Setting detail: THERAPIES SERIES
Discharge: HOME | End: 2023-05-10
Payer: COMMERCIAL

## 2023-05-10 PROCEDURE — 97022 WHIRLPOOL THERAPY: CPT

## 2023-05-10 PROCEDURE — 97140 MANUAL THERAPY 1/> REGIONS: CPT

## 2023-05-10 PROCEDURE — 97110 THERAPEUTIC EXERCISES: CPT

## 2023-05-10 PROCEDURE — 97530 THERAPEUTIC ACTIVITIES: CPT

## 2023-05-10 NOTE — FLOWSHEET NOTE
[address] [facility] Occupational Therapy  [address] [place] [address] Angel [place], [address]  Phone: [phone]   Fax: [phone]      Occupational Therapy Daily Treatment Note  Date:  5/10/2023    Patient: Rico Leal   : 1960   MRN: [id number]  Referring Physician: Maria Ines Cast, MAKENZIE-CNP         Medical Diagnosis Information: L DRF s/p ORIF 23 (S52.572A)    Date of Injury:  23 - fall  Date of Surgery:   23 - ORIF                                      Insurance information: [employer], 30 visits each/yr, no copay or pt responsibility     Plan of care sent to provider:      []Faxed   [x]Co-signature    (attempts: 1[] 2[] 3[])       Progress Report: []  Yes  [x]  No     Date Range for reporting period:  Beginnin23  Ending: end of POC    Progress report due (10 Rx/or 30 days whichever is less): visit #10 or      Recertification due (POC duration/ or 90 days whichever is less): visit #12 or 23    Visit # Insurance Allowable Auth required? Date Range    TE- 40 units  TA- 40 units  Manual- 40 units  Paraffin- 20 units  Fluido- 20 units  ADL- void []  Yes  []  No 23-7/15/23       Latex Allergy:  [x]No      []Yes  Pacemaker:  [x] No       [] Yes     Preferred Language for Healthcare:   [x]English       []other:    Pain level:  pt reports no \"pain,\" but soreness and stiffness throughout the day; reports intermittent increased pain along radial and ulnar aspect of wrist during use    SUBJECTIVE:  Pt reports she has been compliant with HEP with some improvements in ROM. Functional Disability Index:  Quick DASH: total score- 27, disability score- 36.36%    OBJECTIVE: See eval      RESTRICTIONS/PRECAUTIONS: no heavy impact    Exercises/Interventions: Treatment focus on increasing hand and wrist ROM and strength for improved LUE use. 8 min AROM during fluidotherapy for soft tissue and circulatory benefits with decreased stiffness following.

## 2023-05-12 ENCOUNTER — HOSPITAL ENCOUNTER (OUTPATIENT)
Dept: OCCUPATIONAL THERAPY | Age: 63
Setting detail: THERAPIES SERIES
Discharge: HOME | End: 2023-05-12
Payer: COMMERCIAL

## 2023-05-12 PROCEDURE — 97110 THERAPEUTIC EXERCISES: CPT

## 2023-05-12 PROCEDURE — 97530 THERAPEUTIC ACTIVITIES: CPT

## 2023-05-12 PROCEDURE — 97140 MANUAL THERAPY 1/> REGIONS: CPT

## 2023-05-12 PROCEDURE — 97018 PARAFFIN BATH THERAPY: CPT

## 2023-05-12 NOTE — FLOWSHEET NOTE
use in grasping and carrying by 5/24/23. [x] Progressing: [] Met: [] Not Met: [] Adjusted   2. Pt will improve RUE ROM by at least 10* each in wrist flexion, wrist extension, and supination to improve functional participation in IADLs by 5/24/23. [x] Progressing: [] Met: [] Not Met: [] Adjusted  3. Pt will increase R hand  strength to 25 pounds or more to improve functional strength and control in IADLs by 5/24/23. [x] Progressing: [] Met: [] Not Met: [] Adjusted     Long Term Goals: To be achieved by discharge  1. Pt will report a QuickDASH Symptom Severity Scale score of 17 or less indicating increased safety and functional independence in desired occupational pursuits by discharge. [x] Progressing: [] Met: [] Not Met: [] Adjusted    Progression Towards Functional goals:  [x] Patient is progressing as expected towards functional goals listed. [] Progression is slowed due to complexities listed. [] Progression has been slowed due to co-morbidities. [] Plan just implemented, too soon to assess goals progression  [] All goals are met  [] Other:     ASSESSMENT:  Pt is making improvements in ROM with some continued weakness impacting ability to maintain full fist without min A. Mild motor planning deficits with increased cues required for sequencing.      Treatment/Activity Tolerance:  [x] Patient tolerated treatment well [] Patient limited by fatigue  [] Patient limited by pain  [] Patient limited by other medical complications  [] Other:     Prognosis: [x] Good [] Fair  [] Poor    Patient Requires Follow-up: [x] Yes  [] No    PLAN: See eval  [x] Continue per plan of care [] Alter current plan (see comments)  [] Plan of care initiated [] Hold pending MD visit [] Discharge    Electronically signed by: GAYE White/L, C/NDT ([id number])      Note: If patient does not return for scheduled/ recommended follow up visits, this note will serve as a discharge from care along with most recent update on

## 2023-05-15 ENCOUNTER — HOSPITAL ENCOUNTER (OUTPATIENT)
Dept: OCCUPATIONAL THERAPY | Age: 63
Setting detail: THERAPIES SERIES
Discharge: HOME | End: 2023-05-15
Payer: COMMERCIAL

## 2023-05-15 PROCEDURE — 97110 THERAPEUTIC EXERCISES: CPT

## 2023-05-15 PROCEDURE — 97140 MANUAL THERAPY 1/> REGIONS: CPT

## 2023-05-15 PROCEDURE — 97018 PARAFFIN BATH THERAPY: CPT

## 2023-05-15 NOTE — FLOWSHEET NOTE
reaching, carrying, lifting, house/yardwork, driving/computer work    [] Comments:    Manual Treatments:  PROM / STM / Oscillations-Mobs:  G-I, II, III, IV (PA's, Inf., Post.)  [x] (77365) Provided manual therapy to mobilize soft tissue/joints of cervical/CT, scapular GHJ and UE for the purpose of modulating pain, promoting relaxation,  increasing ROM, reducing/eliminating soft tissue swelling/inflammation/restriction, improving soft tissue extensibility and allowing for proper ROM for normal function with self care, reaching, carrying, lifting, house/yardwork, driving/computer work  [] Comments:    ADL Training:  [] (87009) Provided self-care/home management training related to activities of daily living and compensatory training, and/or use of adaptive equipment   [] Comments:     Splinting:  [] Fabrication of:   [] (81561) Orthotic/Prosthetic Management, subsequent encounter  [] (31428) Orthotic management and training (fitting and assessment)  [] Comments:      Charges:  Timed Code Treatment Minutes: 37   Total Treatment Minutes: 45     [] EVAL (LOW) 88009   [] OT Re-eval (35595)  [] EVAL (MOD) 63121   [] EVAL (HIGH) 20260       [x] Camille (95169) x    1 [] WJGNF(81943)  [] NMR (35147) x     [] Estim (attended) (01693)   [x] Manual ([id number]) x   1  [] US (53540)  [] TA (72219) x     [x] Paraffin (38223)  [] ADL  ([phone]) x    [] Splint/L code:    [] Estim (unattended) (22 713687)  [] Fluidotherapy (87854)  [] Other:    Approved by insurance:  TE- 40 units  TA- 40 units  Manual- 40 units  Paraffin- 20 units  Fluido- 20 units  ADL- void    GOALS:     Patient stated goal: regain strength and movement  [x] Progressing: [] Met: [] Not Met: [] Adjusted     Therapist goals for Patient:   Short Term Goals: To be achieved in: 30 days  1. Pt will increase R hand ROM to make a full fist for improved functional hand use in grasping and carrying by 5/24/23. [x] Progressing: [] Met: [] Not Met: [] Adjusted   2.  Pt will improve RUE

## 2023-05-17 ENCOUNTER — HOSPITAL ENCOUNTER (OUTPATIENT)
Dept: OCCUPATIONAL THERAPY | Age: 63
Setting detail: THERAPIES SERIES
Discharge: HOME | End: 2023-05-17
Payer: COMMERCIAL

## 2023-05-17 NOTE — PROGRESS NOTES
Occupational Therapy  Cancellation/No-show Note  Patient Name:  Vinh Collins   :  1960   Date:  2023  Cancelled visits to date: 1  No-shows to date: 0    Patient status for today's appointment patient:  [x]  Cancelled: 23  []  Rescheduled appointment  []  No-show     Reason given by patient:  [x]  Patient ill  []  Conflicting appointment  []  No transportation    []  Conflict with work  []  No reason given  []  Other:     Comments:      Phone call information:   []  Phone call made today to patient at number provided:      []  Patient answered, conversation as follows:    []  Patient did not answer, message left as follows:  [x]  Phone call not made today - pt called to cancel    Electronically signed by:  GAYE Umanzor/VANESSA C/NDT ([id number])

## 2023-05-22 ENCOUNTER — HOSPITAL ENCOUNTER (OUTPATIENT)
Dept: OCCUPATIONAL THERAPY | Age: 63
Setting detail: THERAPIES SERIES
Discharge: HOME | End: 2023-05-22
Payer: COMMERCIAL

## 2023-05-22 PROCEDURE — 97140 MANUAL THERAPY 1/> REGIONS: CPT

## 2023-05-22 PROCEDURE — 97110 THERAPEUTIC EXERCISES: CPT

## 2023-05-22 NOTE — PROGRESS NOTES
reaching, carrying, lifting, house/yardwork, driving/computer work.   [] Comments:    Therapeutic Activities:    [x] (95798 or 12622) Provided verbal/tactile cueing for activities related to improving balance, coordination, kinesthetic sense, posture, motor skill, proprioception and motor activation to allow for proper function of scapular, scapulothoracic and UE control with self care, carrying, lifting, driving/computer work  [] Comments:    Home Exercise Program:    [] (11279) Reviewed/Progressed HEP activities related to strengthening, flexibility, endurance, ROM of scapular, scapulothoracic and UE control with self care, reaching, carrying, lifting, house/yardwork, driving/computer work  [] (61387) Reviewed/Progressed HEP activities related to improving balance, coordination, kinesthetic sense, posture, motor skill, proprioception of scapular, scapulothoracic and UE control with self care, reaching, carrying, lifting, house/yardwork, driving/computer work    [] Comments:    Manual Treatments:  PROM / STM / Oscillations-Mobs:  G-I, II, III, IV (PA's, Inf., Post.)  [x] (10398) Provided manual therapy to mobilize soft tissue/joints of cervical/CT, scapular GHJ and UE for the purpose of modulating pain, promoting relaxation,  increasing ROM, reducing/eliminating soft tissue swelling/inflammation/restriction, improving soft tissue extensibility and allowing for proper ROM for normal function with self care, reaching, carrying, lifting, house/yardwork, driving/computer work  [] Comments:    ADL Training:  [] (88232) Provided self-care/home management training related to activities of daily living and compensatory training, and/or use of adaptive equipment   [] Comments:     Splinting:  [] Fabrication of:   [] (64554) Orthotic/Prosthetic Management, subsequent encounter  [] (81091) Orthotic management and training (fitting and assessment)  [] Comments:      Charges:  Timed Code Treatment Minutes: 45   Total Treatment

## 2023-05-23 DIAGNOSIS — E11.9 TYPE 2 DIABETES MELLITUS WITHOUT COMPLICATION, WITHOUT LONG-TERM CURRENT USE OF INSULIN (HCC): ICD-10-CM

## 2023-05-23 RX ORDER — DULAGLUTIDE 1.5 MG/.5ML
INJECTION, SOLUTION SUBCUTANEOUS
Qty: 2 ML | Refills: 0 | Status: SHIPPED | OUTPATIENT
Start: 2023-05-23 | End: 2023-06-05 | Stop reason: SDUPTHER

## 2023-05-24 ENCOUNTER — HOSPITAL ENCOUNTER (OUTPATIENT)
Dept: OCCUPATIONAL THERAPY | Age: 63
Setting detail: THERAPIES SERIES
Discharge: HOME | End: 2023-05-24
Payer: COMMERCIAL

## 2023-05-24 PROCEDURE — 97110 THERAPEUTIC EXERCISES: CPT

## 2023-05-24 PROCEDURE — 97018 PARAFFIN BATH THERAPY: CPT

## 2023-05-24 PROCEDURE — 97140 MANUAL THERAPY 1/> REGIONS: CPT

## 2023-05-24 NOTE — FLOWSHEET NOTE
improvements in scapular, scapulothoracic and UE control with self care, reaching, carrying, lifting, house/yardwork, driving/computer work.    [] (69823) Provided verbal/tactile cueing for activities related to improving balance, coordination, kinesthetic sense, posture, motor skill, proprioception  to assist with  scapular, scapulothoracic and UE control with self care, reaching, carrying, lifting, house/yardwork, driving/computer work.   [] Comments:    Therapeutic Activities:    [x] (63472 or 41397) Provided verbal/tactile cueing for activities related to improving balance, coordination, kinesthetic sense, posture, motor skill, proprioception and motor activation to allow for proper function of scapular, scapulothoracic and UE control with self care, carrying, lifting, driving/computer work  [] Comments:    Home Exercise Program:    [] (25104) Reviewed/Progressed HEP activities related to strengthening, flexibility, endurance, ROM of scapular, scapulothoracic and UE control with self care, reaching, carrying, lifting, house/yardwork, driving/computer work  [] (99199) Reviewed/Progressed HEP activities related to improving balance, coordination, kinesthetic sense, posture, motor skill, proprioception of scapular, scapulothoracic and UE control with self care, reaching, carrying, lifting, house/yardwork, driving/computer work    [] Comments:    Manual Treatments:  PROM / STM / Oscillations-Mobs:  G-I, II, III, IV (PA's, Inf., Post.)  [x] (29534) Provided manual therapy to mobilize soft tissue/joints of cervical/CT, scapular GHJ and UE for the purpose of modulating pain, promoting relaxation,  increasing ROM, reducing/eliminating soft tissue swelling/inflammation/restriction, improving soft tissue extensibility and allowing for proper ROM for normal function with self care, reaching, carrying, lifting, house/yardwork, driving/computer work  [] Comments:    ADL Training:  [] (64281) Provided self-care/home management

## 2023-05-25 DIAGNOSIS — E11.9 TYPE 2 DIABETES MELLITUS WITHOUT COMPLICATION, WITHOUT LONG-TERM CURRENT USE OF INSULIN (HCC): ICD-10-CM

## 2023-05-25 RX ORDER — EMPAGLIFLOZIN 25 MG/1
TABLET, FILM COATED ORAL
Qty: 15 TABLET | Refills: 0 | Status: SHIPPED | OUTPATIENT
Start: 2023-05-25 | End: 2023-07-17

## 2023-05-31 ENCOUNTER — HOSPITAL ENCOUNTER (OUTPATIENT)
Dept: OCCUPATIONAL THERAPY | Age: 63
Setting detail: THERAPIES SERIES
Discharge: HOME | End: 2023-05-31
Payer: COMMERCIAL

## 2023-05-31 PROCEDURE — 97530 THERAPEUTIC ACTIVITIES: CPT

## 2023-05-31 PROCEDURE — 97140 MANUAL THERAPY 1/> REGIONS: CPT

## 2023-05-31 PROCEDURE — 97110 THERAPEUTIC EXERCISES: CPT

## 2023-05-31 NOTE — FLOWSHEET NOTE
is slowed due to complexities listed. [] Progression has been slowed due to co-morbidities. [] Plan just implemented, too soon to assess goals progression  [] All goals are met  [] Other:     ASSESSMENT:  Much improved PROM of finger flexion with ability to fully close hand with min A upon entry. Primary issue is hand  weakness limiting full finger flexion. Continue to address. Treatment/Activity Tolerance:  [x] Patient tolerated treatment well [] Patient limited by fatigue  [] Patient limited by pain  [] Patient limited by other medical complications  [] Other:     Prognosis: [x] Good [] Fair  [] Poor    Patient Requires Follow-up: [x] Yes  [] No    PLAN: See eval  [x] Continue per plan of care [] Alter current plan (see comments)  [] Plan of care initiated [] Hold pending MD visit [] Discharge    Electronically signed by: GAYE Simmons/VANESSA, C/NDT ([id number])      Note: If patient does not return for scheduled/ recommended follow up visits, this note will serve as a discharge from care along with most recent update on progress.

## 2023-06-01 ENCOUNTER — OFFICE VISIT (OUTPATIENT)
Dept: ORTHOPEDIC SURGERY | Age: 63
End: 2023-06-01

## 2023-06-01 VITALS — HEIGHT: 62 IN | BODY MASS INDEX: 25.76 KG/M2 | WEIGHT: 140 LBS

## 2023-06-01 DIAGNOSIS — S92.512A CLOSED DISPLACED FRACTURE OF PROXIMAL PHALANX OF LESSER TOE OF LEFT FOOT, INITIAL ENCOUNTER: ICD-10-CM

## 2023-06-01 DIAGNOSIS — S92.421A DISPLACED FRACTURE OF DISTAL PHALANX OF RIGHT GREAT TOE, INITIAL ENCOUNTER FOR CLOSED FRACTURE: ICD-10-CM

## 2023-06-01 DIAGNOSIS — S52.572A OTHER CLOSED INTRA-ARTICULAR FRACTURE OF DISTAL END OF LEFT RADIUS, INITIAL ENCOUNTER: Primary | ICD-10-CM

## 2023-06-02 ENCOUNTER — HOSPITAL ENCOUNTER (OUTPATIENT)
Dept: OCCUPATIONAL THERAPY | Age: 63
Setting detail: THERAPIES SERIES
Discharge: HOME | End: 2023-06-02
Payer: COMMERCIAL

## 2023-06-02 PROCEDURE — 97530 THERAPEUTIC ACTIVITIES: CPT

## 2023-06-02 PROCEDURE — 97140 MANUAL THERAPY 1/> REGIONS: CPT

## 2023-06-02 PROCEDURE — 97110 THERAPEUTIC EXERCISES: CPT

## 2023-06-02 NOTE — FLOWSHEET NOTE
is slowed due to complexities listed. [] Progression has been slowed due to co-morbidities. [] Plan just implemented, too soon to assess goals progression  [] All goals are met  [] Other:     ASSESSMENT:  Much improved PROM of finger flexion with ability to fully close hand with min A upon entry. Primary issue is hand  weakness limiting full finger flexion. Continue to address. Treatment/Activity Tolerance:  [x] Patient tolerated treatment well [] Patient limited by fatigue  [] Patient limited by pain  [] Patient limited by other medical complications  [] Other:     Prognosis: [x] Good [] Fair  [] Poor    Patient Requires Follow-up: [x] Yes  [] No    PLAN: See eval  [x] Continue per plan of care [] Alter current plan (see comments)  [] Plan of care initiated [] Hold pending MD visit [] Discharge    Electronically signed by: GAYE Espinoza/L, C/NDT ([id number])      Note: If patient does not return for scheduled/ recommended follow up visits, this note will serve as a discharge from care along with most recent update on progress.

## 2023-06-03 NOTE — PROGRESS NOTES
DIAGNOSIS:    1-Left distal radius 3 parts intra articular displaced fracture, status post ORIF. 2-Left foot great toe distal and proximal phalanx open fracture and second toe proximal phalanx fracture, with traumatic left great toenail removal  3-Right foot great toe proximal phalanx fracture. DATE OF SURGERY: 2/16/2023. HISTORY OF PRESENT ILLNESS:  Mario MENDEZ Bippus 60 y.o.  female right handed returns today  for 3 months postoperative visit. The patient denies any significant pain in the left wrist.  Rates pain a 0/10 VAS and is doing much better. She is in a brace. She is also here for her bilateral toe fractures. Her left foot great toe with an open fracture with a traumatic removal of the left and nail much improved wound. She has completed antibiotics. No numbness or tingling sensation. No fever or Chills. She is WB in regular shoes. Denies smoking.     Past Medical History:   Diagnosis Date    Allergic rhinitis, cause unspecified     Cancer ([facility] [address]     left breast    Generalized osteoarthrosis, involving multiple sites     Pure hypercholesterolemia     Pure hyperglyceridemia     Type II or unspecified type diabetes mellitus without mention of complication, uncontrolled     Unspecified essential hypertension        Past Surgical History:   Procedure Laterality Date    CHOLECYSTECTOMY, LAPAROSCOPIC N/A 12/19/2018    LAPAROSCOPIC CHOLECYSTECTOMY; CHOLANGIOGRAM performed by Shyanne Hearn MD at [place] [address]      colonoscopy ordered 6/2010; 5/2011; 11/2011, recommended again and refused at this time on 3/30/12 & 4/2012     COLONOSCOPY N/A 11/29/2018    COLONOSCOPY POLYPECTOMY SNARE/COLD BIOPSY performed by Walt Gomes MD at [facility] Left 2/16/2023    OPEN REDUCTION INTERNAL FIXATION LEFT DISTAL RADIUS-MARY performed by Timothy Stevens MD at [address] [place] [address] Left 04/18/2019    Skin sparing mastectomy Sentinal Lymph node Bx Tissue

## 2023-06-05 ENCOUNTER — OFFICE VISIT (OUTPATIENT)
Dept: INTERNAL MEDICINE CLINIC | Age: 63
End: 2023-06-05
Payer: COMMERCIAL

## 2023-06-05 ENCOUNTER — HOSPITAL ENCOUNTER (OUTPATIENT)
Dept: OCCUPATIONAL THERAPY | Age: 63
Setting detail: THERAPIES SERIES
Discharge: HOME | End: 2023-06-05
Payer: COMMERCIAL

## 2023-06-05 VITALS
OXYGEN SATURATION: 96 % | HEART RATE: 78 BPM | WEIGHT: 138 LBS | BODY MASS INDEX: 25.24 KG/M2 | DIASTOLIC BLOOD PRESSURE: 70 MMHG | SYSTOLIC BLOOD PRESSURE: 138 MMHG

## 2023-06-05 DIAGNOSIS — E78.5 HYPERLIPIDEMIA LDL GOAL <70: ICD-10-CM

## 2023-06-05 DIAGNOSIS — J30.2 SEASONAL ALLERGIES: ICD-10-CM

## 2023-06-05 DIAGNOSIS — E11.9 TYPE 2 DIABETES MELLITUS WITHOUT COMPLICATION, WITHOUT LONG-TERM CURRENT USE OF INSULIN (HCC): Primary | ICD-10-CM

## 2023-06-05 DIAGNOSIS — I10 ESSENTIAL HYPERTENSION: ICD-10-CM

## 2023-06-05 PROCEDURE — G8419 CALC BMI OUT NRM PARAM NOF/U: HCPCS | Performed by: NURSE PRACTITIONER

## 2023-06-05 PROCEDURE — 97110 THERAPEUTIC EXERCISES: CPT

## 2023-06-05 PROCEDURE — 3078F DIAST BP <80 MM HG: CPT | Performed by: NURSE PRACTITIONER

## 2023-06-05 PROCEDURE — 97530 THERAPEUTIC ACTIVITIES: CPT

## 2023-06-05 PROCEDURE — 3017F COLORECTAL CA SCREEN DOC REV: CPT | Performed by: NURSE PRACTITIONER

## 2023-06-05 PROCEDURE — 97140 MANUAL THERAPY 1/> REGIONS: CPT

## 2023-06-05 PROCEDURE — 1036F TOBACCO NON-USER: CPT | Performed by: NURSE PRACTITIONER

## 2023-06-05 PROCEDURE — 3075F SYST BP GE 130 - 139MM HG: CPT | Performed by: NURSE PRACTITIONER

## 2023-06-05 PROCEDURE — G8427 DOCREV CUR MEDS BY ELIG CLIN: HCPCS | Performed by: NURSE PRACTITIONER

## 2023-06-05 PROCEDURE — 3046F HEMOGLOBIN A1C LEVEL >9.0%: CPT | Performed by: NURSE PRACTITIONER

## 2023-06-05 PROCEDURE — 99214 OFFICE O/P EST MOD 30 MIN: CPT | Performed by: NURSE PRACTITIONER

## 2023-06-05 PROCEDURE — 2022F DILAT RTA XM EVC RTNOPTHY: CPT | Performed by: NURSE PRACTITIONER

## 2023-06-05 RX ORDER — ROSUVASTATIN CALCIUM 10 MG/1
10 TABLET, COATED ORAL DAILY
Qty: 90 TABLET | Refills: 1 | Status: SHIPPED | OUTPATIENT
Start: 2023-06-05

## 2023-06-05 RX ORDER — MONTELUKAST SODIUM 10 MG/1
TABLET ORAL
Qty: 90 TABLET | Refills: 1 | Status: SHIPPED | OUTPATIENT
Start: 2023-06-05

## 2023-06-05 RX ORDER — OMEPRAZOLE 40 MG/1
CAPSULE, DELAYED RELEASE ORAL
Qty: 90 CAPSULE | Refills: 1 | Status: SHIPPED | OUTPATIENT
Start: 2023-06-05

## 2023-06-05 RX ORDER — BISOPROLOL FUMARATE AND HYDROCHLOROTHIAZIDE 5; 6.25 MG/1; MG/1
1 TABLET ORAL DAILY
Qty: 90 TABLET | Refills: 1 | Status: SHIPPED | OUTPATIENT
Start: 2023-06-05

## 2023-06-05 RX ORDER — DULAGLUTIDE 1.5 MG/.5ML
INJECTION, SOLUTION SUBCUTANEOUS
Qty: 2 ML | Refills: 0 | Status: SHIPPED | OUTPATIENT
Start: 2023-06-05

## 2023-06-05 SDOH — ECONOMIC STABILITY: HOUSING INSECURITY
IN THE LAST 12 MONTHS, WAS THERE A TIME WHEN YOU DID NOT HAVE A STEADY PLACE TO SLEEP OR SLEPT IN A SHELTER (INCLUDING NOW)?: NO

## 2023-06-05 SDOH — ECONOMIC STABILITY: INCOME INSECURITY: HOW HARD IS IT FOR YOU TO PAY FOR THE VERY BASICS LIKE FOOD, HOUSING, MEDICAL CARE, AND HEATING?: NOT HARD AT ALL

## 2023-06-05 SDOH — ECONOMIC STABILITY: FOOD INSECURITY: WITHIN THE PAST 12 MONTHS, THE FOOD YOU BOUGHT JUST DIDN'T LAST AND YOU DIDN'T HAVE MONEY TO GET MORE.: NEVER TRUE

## 2023-06-05 SDOH — ECONOMIC STABILITY: FOOD INSECURITY: WITHIN THE PAST 12 MONTHS, YOU WORRIED THAT YOUR FOOD WOULD RUN OUT BEFORE YOU GOT MONEY TO BUY MORE.: NEVER TRUE

## 2023-06-05 ASSESSMENT — ENCOUNTER SYMPTOMS
WHEEZING: 0
COUGH: 0
CHEST TIGHTNESS: 0
SHORTNESS OF BREATH: 0

## 2023-06-05 ASSESSMENT — PATIENT HEALTH QUESTIONNAIRE - PHQ9
2. FEELING DOWN, DEPRESSED OR HOPELESS: 0
SUM OF ALL RESPONSES TO PHQ QUESTIONS 1-9: 0
SUM OF ALL RESPONSES TO PHQ9 QUESTIONS 1 & 2: 0
1. LITTLE INTEREST OR PLEASURE IN DOING THINGS: 0

## 2023-06-05 NOTE — PROGRESS NOTES
this week (lipid, a1c, bmp) - orders re-printed for pt.      Electronically signed by MAKENZIE Batista CNP on 6/5/2023 at 11:15 AM

## 2023-06-05 NOTE — FLOWSHEET NOTE
[address] [facility] Occupational Therapy  [address] [place] [address], [address] [place] [address] Drive  Phone: [phone]   Fax: [phone]      Occupational Therapy Daily Treatment Note  Date:  2023    Patient: Diana Olea   : 1960   MRN: [id number]  Referring Physician: Juan Carlos Lima, MAKENZIE-CNP         Medical Diagnosis Information: L DRF s/p ORIF 23 (S52.572A)    Date of Injury:  23 - fall  Date of Surgery:   23 - ORIF                                      Insurance information: Maximo COOPER, 30 visits each/yr, no copay or pt responsibility     Plan of care sent to provider:      []Faxed   [x]Co-signature    (attempts: 1[] 2[] 3[])       Progress Report: []  Yes  [x]  No     Date Range for reporting period:  Beginnin23  Ending: end of POC    Progress report due (10 Rx/or 30 days whichever is less): visit #12 or 3/95/59     Recertification due (POC duration/ or 90 days whichever is less): visit #12 or 23    Visit # Insurance Allowable Auth required? Date Range    TE- 40 units  TA- 40 units  Manual- 40 units  Paraffin- 20 units  Fluido- 20 units  ADL- void []  Yes  []  No 23-7/15/23       Latex Allergy:  [x]No      []Yes  Pacemaker:  [x] No       [] Yes     Preferred Language for Healthcare:   [x]English       []other:    Pain level:  pt reports no \"pain,\" but soreness and stiffness throughout the day; reports intermittent increased pain along radial aspect of wrist during use    SUBJECTIVE:  Pt reports improved sleep the last few nights with decreased pain and numbness into wrist and hand.      Functional Disability Index:  Quick DASH: total score- 27, disability score- 36.36%    OBJECTIVE:     Date Right Left    23 MCP circumference- 18.0 cm  Wrist circumference- 16.0 cm MCP circumference- 18.0 cm  Wrist circumference- 16.4 cm    23   Wrist circumference- 16.1 cm         ROM WFL: []Yes [x]No (if checked, see below)       AROM     L R

## 2023-06-19 ENCOUNTER — HOSPITAL ENCOUNTER (OUTPATIENT)
Dept: OCCUPATIONAL THERAPY | Age: 63
Setting detail: THERAPIES SERIES
Discharge: HOME | End: 2023-06-19
Payer: COMMERCIAL

## 2023-06-19 PROCEDURE — 97110 THERAPEUTIC EXERCISES: CPT

## 2023-06-19 PROCEDURE — 97140 MANUAL THERAPY 1/> REGIONS: CPT

## 2023-06-19 PROCEDURE — 97530 THERAPEUTIC ACTIVITIES: CPT

## 2023-06-19 NOTE — PROGRESS NOTES
[address] [facility] Occupational Therapy  [address] [place] [address], [address]  Phone: [phone]   Fax: [phone]      Occupational Therapy Daily Treatment Note  Date:  2023    Patient: Daniel Ramirez   : 1960   MRN: [id number]  Referring Physician: Selena Marks, APRN-CNP         Medical Diagnosis Information: L DRF s/p ORIF 23 (S52.572A)    Date of Injury:  23 - fall  Date of Surgery:   23 - ORIF                                      Insurance information: Maximo COOPER, 30 visits each/yr, no copay or pt responsibility     Plan of care sent to provider:      []Faxed   [x]Co-signature    (attempts: 1[] 2[] 3[])       Progress Report: [x]  Yes  []  No     Date Range for reporting period:  Beginnin23  Ending: end of POC    Progress report due (10 Rx/or 30 days whichever is less): visit #12 or 55     Recertification due (POC duration/ or 90 days whichever is less): visit #12 or 23    Visit # Insurance Allowable Auth required? Date Range   10/12 TE- 40 units  TA- 40 units  Manual- 40 units  Paraffin- 20 units  Fluido- 20 units  ADL- void []  Yes  []  No 23-7/15/23       Latex Allergy:  [x]No      []Yes  Pacemaker:  [x] No       [] Yes     Preferred Language for Healthcare:   [x]English       []other:    Pain level:  pt reports no \"pain,\" but soreness and stiffness throughout the day; reports intermittent increased pain along radial aspect of wrist during use    SUBJECTIVE:  Pt reports improved ROM recently; has been attempting to stretch more aggressively.      Functional Disability Index:  Quick DASH: total score- 27, disability score- 36.36%    OBJECTIVE:     Date Right Left    23 MCP circumference- 18.0 cm  Wrist circumference- 16.0 cm MCP circumference- 18.0 cm  Wrist circumference- 16.4 cm    23   Wrist circumference- 16.1 cm         ROM WFL: []Yes [x]No (if checked, see below)       AROM     L R   Supination

## 2023-06-21 ENCOUNTER — APPOINTMENT (OUTPATIENT)
Dept: OCCUPATIONAL THERAPY | Age: 63
End: 2023-06-21
Payer: COMMERCIAL

## 2023-06-26 ENCOUNTER — HOSPITAL ENCOUNTER (OUTPATIENT)
Dept: OCCUPATIONAL THERAPY | Age: 63
Setting detail: THERAPIES SERIES
Discharge: HOME | End: 2023-06-26
Payer: COMMERCIAL

## 2023-06-26 PROCEDURE — 97140 MANUAL THERAPY 1/> REGIONS: CPT

## 2023-06-26 PROCEDURE — 97110 THERAPEUTIC EXERCISES: CPT

## 2023-06-26 PROCEDURE — 97018 PARAFFIN BATH THERAPY: CPT

## 2023-07-06 ENCOUNTER — HOSPITAL ENCOUNTER (OUTPATIENT)
Dept: OCCUPATIONAL THERAPY | Age: 63
Setting detail: THERAPIES SERIES
Discharge: HOME | End: 2023-07-06

## 2023-07-06 NOTE — PROGRESS NOTES
Occupational Therapy  Cancellation/No-show Note  Patient Name:  Alfonso Phan   :  1960   Date:  2023  Cancelled visits to date: 2  No-shows to date: 0    Patient status for today's appointment patient:  [x]  Cancelled: 23, 23  []  Rescheduled appointment  []  No-show     Reason given by patient:  []  Patient ill  []  Conflicting appointment  []  No transportation    []  Conflict with work  [x]  No reason given  []  Other:     Comments:      Phone call information:   []  Phone call made today to patient at number provided:      []  Patient answered, conversation as follows:    []  Patient did not answer, message left as follows:  [x]  Phone call not made today - pt called to cancel    Electronically signed by:  Tip Whiteside OTR/L, C/NDT ([id number])

## 2023-07-17 DIAGNOSIS — E11.9 TYPE 2 DIABETES MELLITUS WITHOUT COMPLICATION, WITHOUT LONG-TERM CURRENT USE OF INSULIN (HCC): ICD-10-CM

## 2023-07-17 RX ORDER — DULAGLUTIDE 1.5 MG/.5ML
INJECTION, SOLUTION SUBCUTANEOUS
Qty: 2 ML | Refills: 0 | Status: SHIPPED | OUTPATIENT
Start: 2023-07-17 | End: 2023-07-18 | Stop reason: SDUPTHER

## 2023-07-17 RX ORDER — EMPAGLIFLOZIN 25 MG/1
TABLET, FILM COATED ORAL
Qty: 30 TABLET | Refills: 1 | Status: SHIPPED | OUTPATIENT
Start: 2023-07-17 | End: 2023-07-18 | Stop reason: SDUPTHER

## 2023-07-18 DIAGNOSIS — R05.9 COUGH: ICD-10-CM

## 2023-07-18 DIAGNOSIS — E11.9 TYPE 2 DIABETES MELLITUS WITHOUT COMPLICATION, WITHOUT LONG-TERM CURRENT USE OF INSULIN (HCC): ICD-10-CM

## 2023-07-18 DIAGNOSIS — R09.81 NASAL CONGESTION: ICD-10-CM

## 2023-07-18 DIAGNOSIS — J30.2 SEASONAL ALLERGIES: ICD-10-CM

## 2023-07-18 DIAGNOSIS — E78.5 HYPERLIPIDEMIA LDL GOAL <70: ICD-10-CM

## 2023-07-18 DIAGNOSIS — J30.89 SEASONAL ALLERGIC RHINITIS DUE TO OTHER ALLERGIC TRIGGER: ICD-10-CM

## 2023-07-18 DIAGNOSIS — I10 ESSENTIAL HYPERTENSION: ICD-10-CM

## 2023-07-18 DIAGNOSIS — R06.7 SNEEZING: ICD-10-CM

## 2023-07-18 RX ORDER — BISOPROLOL FUMARATE AND HYDROCHLOROTHIAZIDE 5; 6.25 MG/1; MG/1
1 TABLET ORAL DAILY
Qty: 90 TABLET | Refills: 0 | Status: SHIPPED | OUTPATIENT
Start: 2023-07-18

## 2023-07-18 RX ORDER — MONTELUKAST SODIUM 10 MG/1
10 TABLET ORAL NIGHTLY
Qty: 90 TABLET | Refills: 0 | Status: SHIPPED | OUTPATIENT
Start: 2023-07-18

## 2023-07-18 RX ORDER — VALSARTAN 320 MG/1
320 TABLET ORAL DAILY
Qty: 90 TABLET | Refills: 0 | Status: SHIPPED | OUTPATIENT
Start: 2023-07-18

## 2023-07-18 RX ORDER — ROSUVASTATIN CALCIUM 10 MG/1
10 TABLET, COATED ORAL DAILY
Qty: 90 TABLET | Refills: 0 | Status: SHIPPED | OUTPATIENT
Start: 2023-07-18

## 2023-07-18 RX ORDER — FLUTICASONE PROPIONATE 50 MCG
SPRAY, SUSPENSION (ML) NASAL
Qty: 3 EACH | Refills: 0 | Status: SHIPPED | OUTPATIENT
Start: 2023-07-18

## 2023-07-18 RX ORDER — GLIPIZIDE 5 MG/1
5 TABLET ORAL
Qty: 180 TABLET | Refills: 0 | Status: SHIPPED | OUTPATIENT
Start: 2023-07-18

## 2023-07-18 RX ORDER — DULAGLUTIDE 1.5 MG/.5ML
1.5 INJECTION, SOLUTION SUBCUTANEOUS WEEKLY
Qty: 2 ML | Refills: 2 | Status: SHIPPED | OUTPATIENT
Start: 2023-07-18

## 2023-07-18 RX ORDER — OMEPRAZOLE 40 MG/1
40 CAPSULE, DELAYED RELEASE ORAL DAILY
Qty: 90 CAPSULE | Refills: 0 | Status: SHIPPED | OUTPATIENT
Start: 2023-07-18

## 2023-07-18 NOTE — TELEPHONE ENCOUNTER
pt calling requesting refill of all medications ACCEPT lantus    Last written 6/5/23  Last OV   Next OV 9/11/23  Last recommended OV      Please send to [facility] [address]

## 2023-07-20 ENCOUNTER — HOSPITAL ENCOUNTER (OUTPATIENT)
Dept: OCCUPATIONAL THERAPY | Age: 63
Setting detail: THERAPIES SERIES
Discharge: HOME | End: 2023-07-20
Payer: COMMERCIAL

## 2023-07-20 PROCEDURE — 97022 WHIRLPOOL THERAPY: CPT

## 2023-07-20 PROCEDURE — 97110 THERAPEUTIC EXERCISES: CPT

## 2023-07-20 NOTE — PLAN OF CARE
(attended) (12710)   [] Manual ([id number]) x    [] US (46386)  [] TA () x     [] Paraffin (56673)  [] ADL  ([phone]) x    [] Splint/L code:    [] Estim (unattended) ([id number])  [x] Fluidotherapy (41562)  [] Other:    Approved by insurance:  TE- 40 units  TA- 40 units  Manual- 40 units  Paraffin- 20 units  Fluido- 20 units  ADL- void    GOALS:     Patient stated goal: regain strength and movement  [] Progressing: [x] Met: [] Not Met: [] Adjusted     Therapist goals for Patient:   Short Term Goals: To be achieved in: 30 days  1. Pt will increase R hand ROM to make a full fist for improved functional hand use in grasping and carrying by 5/24/23. [] Progressing: [x] Met: [] Not Met: [] Adjusted   2. Pt will improve RUE ROM by at least 10* each in wrist flexion, wrist extension, and supination to improve functional participation in IADLs by 5/24/23. [] Progressing: [x] Met: [] Not Met: [] Adjusted  3. Pt will increase R hand  strength to 25 pounds or more to improve functional strength and control in IADLs by 5/24/23. [] Progressing: [] Met: [x] Not Met: [] Adjusted     Long Term Goals: To be achieved by discharge  1. Pt will report a QuickDASH Symptom Severity Scale score of 17 or less indicating increased safety and functional independence in desired occupational pursuits by discharge. [] Progressing: [x] Met: [] Not Met: [] Adjusted    Progression Towards Functional goals:  [] Patient is progressing as expected towards functional goals listed. [] Progression is slowed due to complexities listed. [] Progression has been slowed due to co-morbidities. [] Plan just implemented, too soon to assess goals progression  [x] All goals are met aside from hand  strength (likely impacted by OA)  [] Other:     ASSESSMENT:  Pt has made improvements in L hand and wrist ROM, strength, and coordination. Continued deficits from baseline with progress likely impacted by OA and cancer.  Pt is expected to continue improving

## 2023-07-24 NOTE — PROGRESS NOTES
PCP:  Medical Oncology: [facility]  Radiation:  Other: [facility]        pT1cN0  STAGE:  IA left breast cancer      Ms. Rohan Nguyễn is a 60y.o.-year-old woman who initially presented to me with  left breast cancer. Since her last encounter Ms. Chopra has been doing quite well. She did unfortunately have to have her expander removed due to an infection. She has been interested in other reconstructive options in the past but has not decided to proceed. There are no new breast related concerns today. INTERVAL HISTORY:    On 4/18/2018 she underwent a left mastectomy with sentinel lymph node biopsy. Pathology identified 1.3 cm of grade 2 invasive ductal carcinoma with grade 3 DCIS. ER positive MO negative HER-2 negative (repeat testing). Margins were negative. There were 0/4 lymph nodes involved carcinoma. On 3/29/2019 she underwent screening right breast mammogram. There are no concerning findings suggestive of malignancy. BI-RADS 1. On 12/26/2019 she underwent ultrasound of the left breast wall for a palpable concern. There is a benign-appearing cystic lesion noted in her area of palpable concern. BI-RADS 2. On 6/1/2020 she underwent unilateral right screening mammography. The parenchymal pattern is stable. There are no new concerning findings suggestive of malignancy. BI-RADS 2. On 6/14/2021 she underwent unilateral right screening mammography. There were no concerning findings suggestive of malignancy. BI-RADS 1. On 6/20/2022 she underwent unilateral right screening mammography. There were no concerning findings suggestive of malignancy. BI-RADS 1. On 7/27/2023 she underwent unilateral right screening mammography. There were no concerning findings suggestive of malignancy. BI-RADS 1. Exam:  The physical exam has been reviewed and updated where needed. General: no acute distress  Breast:  The patient was examined in the upright and supine position.  There is a well healed scar on

## 2023-07-27 ENCOUNTER — HOSPITAL ENCOUNTER (OUTPATIENT)
Dept: WOMENS IMAGING | Age: 63
Discharge: HOME | End: 2023-07-27
Payer: COMMERCIAL

## 2023-07-27 ENCOUNTER — OFFICE VISIT (OUTPATIENT)
Dept: SURGERY | Age: 63
End: 2023-07-27
Payer: COMMERCIAL

## 2023-07-27 VITALS
BODY MASS INDEX: 26.5 KG/M2 | OXYGEN SATURATION: 95 % | HEART RATE: 90 BPM | DIASTOLIC BLOOD PRESSURE: 73 MMHG | WEIGHT: 144 LBS | SYSTOLIC BLOOD PRESSURE: 143 MMHG | HEIGHT: 62 IN | RESPIRATION RATE: 18 BRPM

## 2023-07-27 DIAGNOSIS — Z85.3 ENCOUNTER FOR FOLLOW-UP SURVEILLANCE OF BREAST CANCER: ICD-10-CM

## 2023-07-27 DIAGNOSIS — Z12.31 SCREENING MAMMOGRAM, ENCOUNTER FOR: ICD-10-CM

## 2023-07-27 DIAGNOSIS — Z08 ENCOUNTER FOR FOLLOW-UP SURVEILLANCE OF BREAST CANCER: ICD-10-CM

## 2023-07-27 DIAGNOSIS — Z85.3 PERSONAL HISTORY OF BREAST CANCER: Primary | ICD-10-CM

## 2023-07-27 DIAGNOSIS — Z12.39 SCREENING BREAST EXAMINATION: ICD-10-CM

## 2023-07-27 PROCEDURE — G8427 DOCREV CUR MEDS BY ELIG CLIN: HCPCS | Performed by: SURGERY

## 2023-07-27 PROCEDURE — 3077F SYST BP >= 140 MM HG: CPT | Performed by: SURGERY

## 2023-07-27 PROCEDURE — G0279 TOMOSYNTHESIS, MAMMO: HCPCS

## 2023-07-27 PROCEDURE — 1036F TOBACCO NON-USER: CPT | Performed by: SURGERY

## 2023-07-27 PROCEDURE — 99213 OFFICE O/P EST LOW 20 MIN: CPT | Performed by: SURGERY

## 2023-07-27 PROCEDURE — G8419 CALC BMI OUT NRM PARAM NOF/U: HCPCS | Performed by: SURGERY

## 2023-07-27 PROCEDURE — 3017F COLORECTAL CA SCREEN DOC REV: CPT | Performed by: SURGERY

## 2023-07-27 PROCEDURE — 3078F DIAST BP <80 MM HG: CPT | Performed by: SURGERY

## 2023-07-31 DIAGNOSIS — Z85.3 PERSONAL HISTORY OF BREAST CANCER: ICD-10-CM

## 2023-07-31 DIAGNOSIS — Z12.31 ENCOUNTER FOR SCREENING MAMMOGRAM FOR MALIGNANT NEOPLASM OF BREAST: Primary | ICD-10-CM

## 2023-07-31 DIAGNOSIS — Z85.3 ENCOUNTER FOR FOLLOW-UP SURVEILLANCE OF BREAST CANCER: ICD-10-CM

## 2023-07-31 DIAGNOSIS — Z08 ENCOUNTER FOR FOLLOW-UP SURVEILLANCE OF BREAST CANCER: ICD-10-CM

## 2023-08-29 RX ORDER — INSULIN GLARGINE 100 [IU]/ML
INJECTION, SOLUTION SUBCUTANEOUS
Qty: 15 ML | Refills: 5 | Status: SHIPPED | OUTPATIENT
Start: 2023-08-29

## 2023-09-07 ENCOUNTER — OFFICE VISIT (OUTPATIENT)
Dept: ORTHOPEDIC SURGERY | Age: 63
End: 2023-09-07
Payer: COMMERCIAL

## 2023-09-07 VITALS — HEIGHT: 62 IN | WEIGHT: 144 LBS | BODY MASS INDEX: 26.5 KG/M2

## 2023-09-07 DIAGNOSIS — S52.572A OTHER CLOSED INTRA-ARTICULAR FRACTURE OF DISTAL END OF LEFT RADIUS, INITIAL ENCOUNTER: Primary | ICD-10-CM

## 2023-09-07 DIAGNOSIS — S92.422B DISPLACED FRACTURE OF DISTAL PHALANX OF LEFT GREAT TOE, INITIAL ENCOUNTER FOR OPEN FRACTURE: ICD-10-CM

## 2023-09-07 DIAGNOSIS — S92.421A DISPLACED FRACTURE OF DISTAL PHALANX OF RIGHT GREAT TOE, INITIAL ENCOUNTER FOR CLOSED FRACTURE: ICD-10-CM

## 2023-09-07 DIAGNOSIS — S92.512A CLOSED DISPLACED FRACTURE OF PROXIMAL PHALANX OF LESSER TOE OF LEFT FOOT, INITIAL ENCOUNTER: ICD-10-CM

## 2023-09-07 PROCEDURE — G8427 DOCREV CUR MEDS BY ELIG CLIN: HCPCS | Performed by: ORTHOPAEDIC SURGERY

## 2023-09-07 PROCEDURE — 99214 OFFICE O/P EST MOD 30 MIN: CPT | Performed by: ORTHOPAEDIC SURGERY

## 2023-09-07 PROCEDURE — 1036F TOBACCO NON-USER: CPT | Performed by: ORTHOPAEDIC SURGERY

## 2023-09-07 PROCEDURE — G8419 CALC BMI OUT NRM PARAM NOF/U: HCPCS | Performed by: ORTHOPAEDIC SURGERY

## 2023-09-07 PROCEDURE — 3017F COLORECTAL CA SCREEN DOC REV: CPT | Performed by: ORTHOPAEDIC SURGERY

## 2023-09-11 ENCOUNTER — OFFICE VISIT (OUTPATIENT)
Dept: INTERNAL MEDICINE CLINIC | Age: 63
End: 2023-09-11
Payer: COMMERCIAL

## 2023-09-11 VITALS
BODY MASS INDEX: 26.24 KG/M2 | HEART RATE: 90 BPM | OXYGEN SATURATION: 96 % | HEIGHT: 62 IN | WEIGHT: 142.6 LBS | SYSTOLIC BLOOD PRESSURE: 128 MMHG | DIASTOLIC BLOOD PRESSURE: 70 MMHG

## 2023-09-11 DIAGNOSIS — E78.5 HYPERLIPIDEMIA LDL GOAL <70: ICD-10-CM

## 2023-09-11 DIAGNOSIS — I10 ESSENTIAL HYPERTENSION: ICD-10-CM

## 2023-09-11 DIAGNOSIS — D12.6 TUBULAR ADENOMA OF COLON: ICD-10-CM

## 2023-09-11 DIAGNOSIS — E11.9 TYPE 2 DIABETES MELLITUS WITHOUT COMPLICATION, WITHOUT LONG-TERM CURRENT USE OF INSULIN (HCC): ICD-10-CM

## 2023-09-11 DIAGNOSIS — Z85.3 HISTORY OF BREAST CANCER: ICD-10-CM

## 2023-09-11 DIAGNOSIS — E11.9 TYPE 2 DIABETES MELLITUS WITHOUT COMPLICATION, WITHOUT LONG-TERM CURRENT USE OF INSULIN (HCC): Primary | ICD-10-CM

## 2023-09-11 DIAGNOSIS — Z12.11 COLON CANCER SCREENING: ICD-10-CM

## 2023-09-11 DIAGNOSIS — M81.0 AGE-RELATED OSTEOPOROSIS WITHOUT CURRENT PATHOLOGICAL FRACTURE: ICD-10-CM

## 2023-09-11 DIAGNOSIS — G43.009 MIGRAINE WITHOUT AURA AND WITHOUT STATUS MIGRAINOSUS, NOT INTRACTABLE: ICD-10-CM
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
## 2023-09-11 PROBLEM — S92.512A CLOSED DISPLACED FRACTURE OF PROXIMAL PHALANX OF LESSER TOE OF LEFT FOOT: Status: RESOLVED | Noted: 2023-02-16 | Resolved: 2023-09-11

## 2023-09-11 PROBLEM — S92.422B DISPLACED FRACTURE OF DISTAL PHALANX OF LEFT GREAT TOE, INITIAL ENCOUNTER FOR OPEN FRACTURE: Status: RESOLVED | Noted: 2023-02-16 | Resolved: 2023-09-11

## 2023-09-11 PROBLEM — R47.01 EXPRESSIVE APHASIA: Status: RESOLVED | Noted: 2019-04-26 | Resolved: 2023-09-11

## 2023-09-11 PROBLEM — S52.502A CLOSED FRACTURE OF LEFT DISTAL RADIUS: Status: RESOLVED | Noted: 2023-02-16 | Resolved: 2023-09-11

## 2023-09-11 PROBLEM — S92.421A DISPLACED FRACTURE OF DISTAL PHALANX OF RIGHT GREAT TOE, INITIAL ENCOUNTER FOR CLOSED FRACTURE: Status: RESOLVED | Noted: 2023-02-16 | Resolved: 2023-09-11

## 2023-09-11 PROBLEM — M65.351 TRIGGER LITTLE FINGER OF RIGHT HAND: Status: RESOLVED | Noted: 2022-06-06 | Resolved: 2023-09-11

## 2023-09-11 LAB
ALBUMIN SERPL-MCNC: 4.7 G/DL (ref 3.4–5)
ALBUMIN/GLOB SERPL: 1.8 {RATIO} (ref 1.1–2.2)
ALP SERPL-CCNC: 81 U/L (ref 40–129)
ALT SERPL-CCNC: 10 U/L (ref 10–40)
ANION GAP SERPL CALCULATED.3IONS-SCNC: 13 MMOL/L (ref 3–16)
AST SERPL-CCNC: 16 U/L (ref 15–37)
BILIRUB SERPL-MCNC: 0.8 MG/DL (ref 0–1)
BUN SERPL-MCNC: 16 MG/DL (ref 7–20)
CALCIUM SERPL-MCNC: 10.3 MG/DL (ref 8.3–10.6)
CHLORIDE SERPL-SCNC: 106 MMOL/L (ref 99–110)
CHOLEST SERPL-MCNC: 105 MG/DL (ref 0–199)
CO2 SERPL-SCNC: 21 MMOL/L (ref 21–32)
CREAT SERPL-MCNC: 0.8 MG/DL (ref 0.6–1.2)
GFR SERPLBLD CREATININE-BSD FMLA CKD-EPI: >60 ML/MIN/{1.73_M2}
GLUCOSE SERPL-MCNC: 82 MG/DL (ref 70–99)
HDLC SERPL-MCNC: 34 MG/DL (ref 40–60)
LDLC SERPL CALC-MCNC: 34 MG/DL
POTASSIUM SERPL-SCNC: 4.4 MMOL/L (ref 3.5–5.1)
PROT SERPL-MCNC: 7.3 G/DL (ref 6.4–8.2)
SODIUM SERPL-SCNC: 140 MMOL/L (ref 136–145)
TRIGL SERPL-MCNC: 186 MG/DL (ref 0–150)
VLDLC SERPL CALC-MCNC: 37 MG/DL

## 2023-09-11 PROCEDURE — 99214 OFFICE O/P EST MOD 30 MIN: CPT | Performed by: INTERNAL MEDICINE

## 2023-09-11 PROCEDURE — G8427 DOCREV CUR MEDS BY ELIG CLIN: HCPCS | Performed by: INTERNAL MEDICINE

## 2023-09-11 PROCEDURE — 3078F DIAST BP <80 MM HG: CPT | Performed by: INTERNAL MEDICINE

## 2023-09-11 PROCEDURE — 3051F HG A1C>EQUAL 7.0%<8.0%: CPT | Performed by: INTERNAL MEDICINE

## 2023-09-11 PROCEDURE — 3017F COLORECTAL CA SCREEN DOC REV: CPT | Performed by: INTERNAL MEDICINE

## 2023-09-11 PROCEDURE — 3074F SYST BP LT 130 MM HG: CPT | Performed by: INTERNAL MEDICINE

## 2023-09-11 PROCEDURE — G8419 CALC BMI OUT NRM PARAM NOF/U: HCPCS | Performed by: INTERNAL MEDICINE

## 2023-09-11 PROCEDURE — 2022F DILAT RTA XM EVC RTNOPTHY: CPT | Performed by: INTERNAL MEDICINE

## 2023-09-11 PROCEDURE — 1036F TOBACCO NON-USER: CPT | Performed by: INTERNAL MEDICINE

## 2023-09-11 NOTE — PATIENT INSTRUCTIONS
Recommended vaccines:  Flu vaccine - late Sept/early Oct  COVID- late Sept/early Oct  RSV vaccine- once in Nov/Dec time frame

## 2023-09-11 NOTE — PROGRESS NOTES
2 times daily (before meals) 180 tablet 0    metFORMIN (GLUCOPHAGE) 850 MG tablet Take 1 tablet by mouth 2 times daily (with meals) 180 tablet 0    valACYclovir (VALTREX) 1 g tablet Take 2 tablets by mouth 2 times daily X 2 doses prn cold sores. 20 tablet 0    Azelastine HCl 137 MCG/SPRAY SOLN SPRAY 2 SPRAYS IN EACH NOSTRIL DAILY 30 mL 5    loratadine (CLARITIN) 10 MG tablet TAKE 1 TABLET BY MOUTH EVERY DAY 90 tablet 3    Insulin Pen Needle (PEN NEEDLES 3/16\") 31G X 5 MM MISC 1 each by Does not apply route daily 100 each 3    blood glucose monitor strips Test 2 times a day & as needed for symptoms of irregular blood glucose 100 strip 5    blood glucose monitor kit and supplies Check sugars fasting and 2 hours after largest meal. 1 kit 0    Lancets MISC 1 each by Does not apply route 2 times daily 100 each 5    sodium chloride (OCEAN, BABY AYR) 0.65 % nasal spray 1 spray by Nasal route as needed for Congestion      blood glucose monitor kit and supplies Test BID times a day & as needed for symptoms of irregular blood glucose. 1 kit 0    blood glucose monitor strips Test BID  times a day & as needed for symptoms of irregular blood glucose. 100 strip 5    vitamin C (ASCORBIC ACID) 500 MG tablet Take 1 tablet by mouth daily      CALCIUM PO Take by mouth 2 times daily      Denosumab (PROLIA SC) Inject into the skin every 6 months      anastrozole (ARIMIDEX) 1 MG tablet Take 1 tablet by mouth nightly      Simethicone 180 MG CAPS Take by mouth 2 times daily as needed      Cholecalciferol (VITAMIN D3) 2000 units CAPS Take by mouth daily       Probiotic Product (PROBIOTIC DAILY PO) Take by mouth daily        No current facility-administered medications for this visit. Return in about 4 months (around 1/11/2024) for labs prior.

## 2023-09-12 LAB
EST. AVERAGE GLUCOSE BLD GHB EST-MCNC: 157.1 MG/DL
HBA1C MFR BLD: 7.1 %

## 2023-09-17 PROBLEM — D12.6 TUBULAR ADENOMA OF COLON: Status: ACTIVE | Noted: 2023-09-17

## 2023-10-25 ENCOUNTER — TELEPHONE (OUTPATIENT)
Dept: INTERNAL MEDICINE CLINIC | Age: 63
End: 2023-10-25

## 2023-10-25 NOTE — TELEPHONE ENCOUNTER
----- Message from Iris David sent at 10/25/2023 11:50 AM EDT -----  Subject: Message to Provider    QUESTIONS  Information for Provider? Patient wants to know when she can get RSV   vaccine. Believes she was told to get it in Nov or Dec.   ---------------------------------------------------------------------------  --------------  [address]  [phone]; OK to leave message on voicemail  ---------------------------------------------------------------------------  --------------  SCRIPT ANSWERS  Relationship to Patient?  Self

## 2023-10-25 NOTE — TELEPHONE ENCOUNTER
Tried to call patient back,phone stopped ringing and went silent.  If patient calls back please   read message from Dr. Shay Woodson

## 2023-11-07 DIAGNOSIS — E11.9 TYPE 2 DIABETES MELLITUS WITHOUT COMPLICATION, WITHOUT LONG-TERM CURRENT USE OF INSULIN (HCC): ICD-10-CM

## 2023-11-07 RX ORDER — DULAGLUTIDE 1.5 MG/.5ML
INJECTION, SOLUTION SUBCUTANEOUS
Qty: 2 ML | Refills: 2 | Status: SHIPPED | OUTPATIENT
Start: 2023-11-07

## 2023-11-09 RX ORDER — FLURBIPROFEN SODIUM 0.3 MG/ML
SOLUTION/ DROPS OPHTHALMIC
Qty: 100 EACH | Refills: 3 | Status: SHIPPED | OUTPATIENT
Start: 2023-11-09

## 2024-01-02 DIAGNOSIS — E11.9 TYPE 2 DIABETES MELLITUS WITHOUT COMPLICATION, WITHOUT LONG-TERM CURRENT USE OF INSULIN (HCC): ICD-10-CM

## 2024-01-02 RX ORDER — INSULIN GLARGINE 100 [IU]/ML
INJECTION, SOLUTION SUBCUTANEOUS
Qty: 15 ML | Refills: 5 | Status: SHIPPED | OUTPATIENT
Start: 2024-01-02

## 2024-01-02 RX ORDER — DULAGLUTIDE 1.5 MG/.5ML
INJECTION, SOLUTION SUBCUTANEOUS
Qty: 2 ML | Refills: 2 | Status: SHIPPED | OUTPATIENT
Start: 2024-01-02

## 2024-01-29 RX ORDER — GLIPIZIDE 5 MG/1
5 TABLET ORAL
Qty: 180 TABLET | Refills: 0 | Status: SHIPPED | OUTPATIENT
Start: 2024-01-29

## 2024-02-24 DIAGNOSIS — R05.9 COUGH: ICD-10-CM

## 2024-02-24 DIAGNOSIS — R09.81 NASAL CONGESTION: ICD-10-CM

## 2024-02-24 DIAGNOSIS — R06.7 SNEEZING: ICD-10-CM

## 2024-02-24 DIAGNOSIS — J30.89 SEASONAL ALLERGIC RHINITIS DUE TO OTHER ALLERGIC TRIGGER: ICD-10-CM

## 2024-02-26 RX ORDER — FLUTICASONE PROPIONATE 50 MCG
SPRAY, SUSPENSION (ML) NASAL
Qty: 112 G | Refills: 0 | Status: SHIPPED | OUTPATIENT
Start: 2024-02-26

## 2024-02-27 ENCOUNTER — TELEPHONE (OUTPATIENT)
Dept: INTERNAL MEDICINE CLINIC | Age: 64
End: 2024-02-27

## 2024-02-27 DIAGNOSIS — E11.9 TYPE 2 DIABETES MELLITUS WITHOUT COMPLICATION, WITHOUT LONG-TERM CURRENT USE OF INSULIN (HCC): Primary | ICD-10-CM

## 2024-02-27 RX ORDER — TIRZEPATIDE 2.5 MG/.5ML
2.5 INJECTION, SOLUTION SUBCUTANEOUS WEEKLY
Qty: 4 EACH | Refills: 3 | Status: SHIPPED | OUTPATIENT
Start: 2024-02-27 | End: 2024-02-29 | Stop reason: SDUPTHER

## 2024-02-27 NOTE — TELEPHONE ENCOUNTER
Pt calling went to refill the Trulicity and it is on back order both at [facility] and [facility]--she is asking if you have any samples---not sure what to do---please call the pt. Thanks.

## 2024-02-27 NOTE — TELEPHONE ENCOUNTER
Mounjaro 2.5 mg sent to [facility] [address]. Will likely need PA. Also dosed weekly with similar side effects as Trulicity- nausea, constipation most commonly.   f

## 2024-02-28 ENCOUNTER — TELEPHONE (OUTPATIENT)
Dept: INTERNAL MEDICINE CLINIC | Age: 64
End: 2024-02-28

## 2024-02-28 DIAGNOSIS — I10 ESSENTIAL HYPERTENSION: ICD-10-CM

## 2024-02-28 RX ORDER — VALSARTAN 320 MG/1
320 TABLET ORAL DAILY
Qty: 30 TABLET | Refills: 0 | Status: SHIPPED | OUTPATIENT
Start: 2024-02-28

## 2024-02-28 NOTE — TELEPHONE ENCOUNTER
Submitted PA for Tirzepatide (MOUNJARO) 2.5 MG/0.5ML SOPN SC injection   Via CMM (Key: [id number]) STATUS: PENDING.    Follow up done daily; if no decision with in three days we will refax.  If another three days goes by with no decision will call the insurance for status.

## 2024-02-28 NOTE — TELEPHONE ENCOUNTER
Pt just called back --the Mounjaro is not covered under her insurance---what does she do now?  Please call the pt. Thanks.

## 2024-02-28 NOTE — TELEPHONE ENCOUNTER
T calling back ---unfortunately the Mounjaro is on back order also---what should she do----please let pt know--Thanks

## 2024-02-29 DIAGNOSIS — E11.9 TYPE 2 DIABETES MELLITUS WITHOUT COMPLICATION, WITHOUT LONG-TERM CURRENT USE OF INSULIN (HCC): ICD-10-CM

## 2024-02-29 RX ORDER — TIRZEPATIDE 2.5 MG/.5ML
2.5 INJECTION, SOLUTION SUBCUTANEOUS WEEKLY
Qty: 4 EACH | Refills: 3 | Status: SHIPPED | OUTPATIENT
Start: 2024-02-29

## 2024-02-29 NOTE — TELEPHONE ENCOUNTER
The medication is APPROVED THRU 02/26/2025    If this requires a response please respond to the pool ( P MHCX PSC MEDICATION PRE-AUTH).      Thank you please advise patient.

## 2024-03-02 DIAGNOSIS — E78.5 HYPERLIPIDEMIA LDL GOAL <70: ICD-10-CM

## 2024-03-04 RX ORDER — ROSUVASTATIN CALCIUM 10 MG/1
10 TABLET, COATED ORAL DAILY
Qty: 90 TABLET | Refills: 0 | Status: SHIPPED | OUTPATIENT
Start: 2024-03-04

## 2024-03-18 DIAGNOSIS — J30.2 SEASONAL ALLERGIES: ICD-10-CM

## 2024-03-19 RX ORDER — MONTELUKAST SODIUM 10 MG/1
TABLET ORAL
Qty: 90 TABLET | Refills: 0 | Status: SHIPPED | OUTPATIENT
Start: 2024-03-19

## 2024-03-25 DIAGNOSIS — R09.81 NASAL CONGESTION: ICD-10-CM

## 2024-03-25 DIAGNOSIS — R06.7 SNEEZING: ICD-10-CM

## 2024-03-25 DIAGNOSIS — J30.89 SEASONAL ALLERGIC RHINITIS DUE TO OTHER ALLERGIC TRIGGER: ICD-10-CM

## 2024-03-25 DIAGNOSIS — R05.9 COUGH: ICD-10-CM

## 2024-03-25 DIAGNOSIS — E11.9 TYPE 2 DIABETES MELLITUS WITHOUT COMPLICATION, WITHOUT LONG-TERM CURRENT USE OF INSULIN (HCC): ICD-10-CM

## 2024-03-26 RX ORDER — FLUTICASONE PROPIONATE 50 MCG
SPRAY, SUSPENSION (ML) NASAL
Qty: 16 G | Refills: 0 | Status: SHIPPED | OUTPATIENT
Start: 2024-03-26

## 2024-03-26 RX ORDER — EMPAGLIFLOZIN 25 MG/1
25 TABLET, FILM COATED ORAL DAILY
Qty: 30 TABLET | Refills: 0 | Status: SHIPPED | OUTPATIENT
Start: 2024-03-26

## 2024-04-15 DIAGNOSIS — E78.5 HYPERLIPIDEMIA LDL GOAL <70: ICD-10-CM

## 2024-04-15 DIAGNOSIS — E11.9 TYPE 2 DIABETES MELLITUS WITHOUT COMPLICATION, WITHOUT LONG-TERM CURRENT USE OF INSULIN (HCC): ICD-10-CM

## 2024-04-16 DIAGNOSIS — I10 ESSENTIAL HYPERTENSION: ICD-10-CM

## 2024-04-16 LAB
ALBUMIN SERPL-MCNC: 4.6 G/DL (ref 3.4–5)
ALBUMIN/GLOB SERPL: 1.7 {RATIO} (ref 1.1–2.2)
ALP SERPL-CCNC: 85 U/L (ref 40–129)
ALT SERPL-CCNC: 12 U/L (ref 10–40)
ANION GAP SERPL CALCULATED.3IONS-SCNC: 17 MMOL/L (ref 3–16)
AST SERPL-CCNC: 20 U/L (ref 15–37)
BASOPHILS # BLD: 0 K/UL (ref 0–0.2)
BASOPHILS NFR BLD: 0.6 %
BILIRUB SERPL-MCNC: 0.9 MG/DL (ref 0–1)
BUN SERPL-MCNC: 11 MG/DL (ref 7–20)
CALCIUM SERPL-MCNC: 10 MG/DL (ref 8.3–10.6)
CHLORIDE SERPL-SCNC: 103 MMOL/L (ref 99–110)
CO2 SERPL-SCNC: 21 MMOL/L (ref 21–32)
CREAT SERPL-MCNC: 0.9 MG/DL (ref 0.6–1.2)
CREAT UR-MCNC: 70.9 MG/DL (ref 28–259)
DEPRECATED RDW RBC AUTO: 14.2 % (ref 12.4–15.4)
EOSINOPHIL # BLD: 0.2 K/UL (ref 0–0.6)
EOSINOPHIL NFR BLD: 2.3 %
EST. AVERAGE GLUCOSE BLD GHB EST-MCNC: 165.7 MG/DL
GFR SERPLBLD CREATININE-BSD FMLA CKD-EPI: 71 ML/MIN/{1.73_M2}
GLUCOSE SERPL-MCNC: 107 MG/DL (ref 70–99)
HBA1C MFR BLD: 7.4 %
HCT VFR BLD AUTO: 37.4 % (ref 36–48)
HGB BLD-MCNC: 12.6 G/DL (ref 12–16)
LYMPHOCYTES # BLD: 1.4 K/UL (ref 1–5.1)
LYMPHOCYTES NFR BLD: 21.3 %
MCH RBC QN AUTO: 30.6 PG (ref 26–34)
MCHC RBC AUTO-ENTMCNC: 33.7 G/DL (ref 31–36)
MCV RBC AUTO: 90.8 FL (ref 80–100)
MICROALBUMIN UR DL<=1MG/L-MCNC: 4.4 MG/DL
MICROALBUMIN/CREAT UR: 62.1 MG/G (ref 0–30)
MONOCYTES # BLD: 0.5 K/UL (ref 0–1.3)
MONOCYTES NFR BLD: 7.6 %
NEUTROPHILS # BLD: 4.5 K/UL (ref 1.7–7.7)
NEUTROPHILS NFR BLD: 68.2 %
PLATELET # BLD AUTO: 211 K/UL (ref 135–450)
PMV BLD AUTO: 9.5 FL (ref 5–10.5)
POTASSIUM SERPL-SCNC: 4.6 MMOL/L (ref 3.5–5.1)
PROT SERPL-MCNC: 7.3 G/DL (ref 6.4–8.2)
RBC # BLD AUTO: 4.12 M/UL (ref 4–5.2)
SODIUM SERPL-SCNC: 141 MMOL/L (ref 136–145)
TSH SERPL DL<=0.005 MIU/L-ACNC: 2.81 UIU/ML (ref 0.27–4.2)
WBC # BLD AUTO: 6.7 K/UL (ref 4–11)

## 2024-04-17 ENCOUNTER — TELEPHONE (OUTPATIENT)
Dept: INTERNAL MEDICINE CLINIC | Age: 64
End: 2024-04-17

## 2024-04-17 ENCOUNTER — PATIENT MESSAGE (OUTPATIENT)
Dept: INTERNAL MEDICINE CLINIC | Age: 64
End: 2024-04-17

## 2024-04-17 ENCOUNTER — OFFICE VISIT (OUTPATIENT)
Dept: INTERNAL MEDICINE CLINIC | Age: 64
End: 2024-04-17
Payer: COMMERCIAL

## 2024-04-17 VITALS
HEIGHT: 62 IN | WEIGHT: 144.2 LBS | BODY MASS INDEX: 26.54 KG/M2 | OXYGEN SATURATION: 94 % | HEART RATE: 78 BPM | DIASTOLIC BLOOD PRESSURE: 62 MMHG | SYSTOLIC BLOOD PRESSURE: 122 MMHG

## 2024-04-17 DIAGNOSIS — M81.0 AGE-RELATED OSTEOPOROSIS WITHOUT CURRENT PATHOLOGICAL FRACTURE: ICD-10-CM

## 2024-04-17 DIAGNOSIS — J30.2 SEASONAL ALLERGIES: ICD-10-CM

## 2024-04-17 DIAGNOSIS — E78.5 HYPERLIPIDEMIA LDL GOAL <70: ICD-10-CM

## 2024-04-17 DIAGNOSIS — I10 ESSENTIAL HYPERTENSION: ICD-10-CM

## 2024-04-17 DIAGNOSIS — E11.9 TYPE 2 DIABETES MELLITUS WITHOUT COMPLICATION, WITHOUT LONG-TERM CURRENT USE OF INSULIN (HCC): Primary | ICD-10-CM

## 2024-04-17 DIAGNOSIS — D12.6 TUBULAR ADENOMA OF COLON: ICD-10-CM

## 2024-04-17 PROCEDURE — 3074F SYST BP LT 130 MM HG: CPT | Performed by: INTERNAL MEDICINE

## 2024-04-17 PROCEDURE — 3051F HG A1C>EQUAL 7.0%<8.0%: CPT | Performed by: INTERNAL MEDICINE

## 2024-04-17 PROCEDURE — 3017F COLORECTAL CA SCREEN DOC REV: CPT | Performed by: INTERNAL MEDICINE

## 2024-04-17 PROCEDURE — 3078F DIAST BP <80 MM HG: CPT | Performed by: INTERNAL MEDICINE

## 2024-04-17 PROCEDURE — 2022F DILAT RTA XM EVC RTNOPTHY: CPT | Performed by: INTERNAL MEDICINE

## 2024-04-17 PROCEDURE — 1036F TOBACCO NON-USER: CPT | Performed by: INTERNAL MEDICINE

## 2024-04-17 PROCEDURE — 99214 OFFICE O/P EST MOD 30 MIN: CPT | Performed by: INTERNAL MEDICINE

## 2024-04-17 PROCEDURE — G8427 DOCREV CUR MEDS BY ELIG CLIN: HCPCS | Performed by: INTERNAL MEDICINE

## 2024-04-17 PROCEDURE — G8419 CALC BMI OUT NRM PARAM NOF/U: HCPCS | Performed by: INTERNAL MEDICINE

## 2024-04-17 RX ORDER — TIRZEPATIDE 5 MG/.5ML
5 INJECTION, SOLUTION SUBCUTANEOUS WEEKLY
Qty: 4 ADJUSTABLE DOSE PRE-FILLED PEN SYRINGE | Refills: 3 | Status: SHIPPED | OUTPATIENT
Start: 2024-04-17 | End: 2024-04-17 | Stop reason: RX

## 2024-04-17 RX ORDER — TIRZEPATIDE 2.5 MG/.5ML
2.5 INJECTION, SOLUTION SUBCUTANEOUS WEEKLY
Qty: 4 EACH | Refills: 5 | Status: SHIPPED | OUTPATIENT
Start: 2024-04-17

## 2024-04-17 RX ORDER — MONTELUKAST SODIUM 10 MG/1
10 TABLET ORAL NIGHTLY
Qty: 90 TABLET | Refills: 1 | Status: SHIPPED | OUTPATIENT
Start: 2024-04-17

## 2024-04-17 RX ORDER — VALSARTAN 320 MG/1
320 TABLET ORAL DAILY
Qty: 90 TABLET | Refills: 1 | Status: SHIPPED | OUTPATIENT
Start: 2024-04-17

## 2024-04-17 RX ORDER — OMEPRAZOLE 40 MG/1
40 CAPSULE, DELAYED RELEASE ORAL DAILY
Qty: 90 CAPSULE | Refills: 1 | Status: SHIPPED | OUTPATIENT
Start: 2024-04-17

## 2024-04-17 RX ORDER — ROSUVASTATIN CALCIUM 10 MG/1
10 TABLET, COATED ORAL DAILY
Qty: 90 TABLET | Refills: 1 | Status: SHIPPED | OUTPATIENT
Start: 2024-04-17

## 2024-04-17 RX ORDER — VALSARTAN 320 MG/1
320 TABLET ORAL DAILY
Qty: 30 TABLET | Refills: 0 | OUTPATIENT
Start: 2024-04-17

## 2024-04-17 RX ORDER — INSULIN GLARGINE 100 [IU]/ML
60 INJECTION, SOLUTION SUBCUTANEOUS NIGHTLY
Qty: 15 ML | Refills: 5 | Status: SHIPPED | OUTPATIENT
Start: 2024-04-17

## 2024-04-17 RX ORDER — GLIPIZIDE 5 MG/1
5 TABLET ORAL
Qty: 180 TABLET | Refills: 1 | Status: SHIPPED | OUTPATIENT
Start: 2024-04-17

## 2024-04-17 RX ORDER — FLURBIPROFEN SODIUM 0.3 MG/ML
1 SOLUTION/ DROPS OPHTHALMIC DAILY
Qty: 100 EACH | Refills: 3 | Status: SHIPPED | OUTPATIENT
Start: 2024-04-17

## 2024-04-17 SDOH — ECONOMIC STABILITY: FOOD INSECURITY: WITHIN THE PAST 12 MONTHS, YOU WORRIED THAT YOUR FOOD WOULD RUN OUT BEFORE YOU GOT MONEY TO BUY MORE.: NEVER TRUE

## 2024-04-17 SDOH — ECONOMIC STABILITY: FOOD INSECURITY: WITHIN THE PAST 12 MONTHS, THE FOOD YOU BOUGHT JUST DIDN'T LAST AND YOU DIDN'T HAVE MONEY TO GET MORE.: NEVER TRUE

## 2024-04-17 SDOH — ECONOMIC STABILITY: INCOME INSECURITY: HOW HARD IS IT FOR YOU TO PAY FOR THE VERY BASICS LIKE FOOD, HOUSING, MEDICAL CARE, AND HEATING?: NOT HARD AT ALL

## 2024-04-17 ASSESSMENT — PATIENT HEALTH QUESTIONNAIRE - PHQ9
1. LITTLE INTEREST OR PLEASURE IN DOING THINGS: NOT AT ALL
SUM OF ALL RESPONSES TO PHQ QUESTIONS 1-9: 0
2. FEELING DOWN, DEPRESSED OR HOPELESS: NOT AT ALL
SUM OF ALL RESPONSES TO PHQ QUESTIONS 1-9: 0
SUM OF ALL RESPONSES TO PHQ9 QUESTIONS 1 & 2: 0

## 2024-04-17 ASSESSMENT — ENCOUNTER SYMPTOMS
DIARRHEA: 0
SHORTNESS OF BREATH: 0
CHEST TIGHTNESS: 0
CONSTIPATION: 0

## 2024-04-17 NOTE — TELEPHONE ENCOUNTER
Called and spoke to [facility] they currently only have 2.5 or 12.5 of Mounjaro. They also have the low dose of Trulicity in. 5 mg and 7.5 mg are on back order. Insurance will not cover taking two doses per week. Should patient stay on 2.5 for now? Or would it be better to go to different pharmacy.

## 2024-04-17 NOTE — PATIENT INSTRUCTIONS
Increase Mounjaro to 5 mg weekly when you are due for your next refill. Finish up the 2.5 mg shots that you have at home.

## 2024-04-17 NOTE — TELEPHONE ENCOUNTER
Pt calling in to report [facility] does not have the 5 mg dose of Mounjaro-no idea when it will be in. Insurance will not cover pt to take two doses of the 2.5 mg dose.    She has two pens left of the 2.5 mg dose.

## 2024-04-17 NOTE — PROGRESS NOTES
largest meal. 1 kit 0    Lancets MISC 1 each by Does not apply route 2 times daily 100 each 5    vitamin C (ASCORBIC ACID) 500 MG tablet Take 1 tablet by mouth daily      CALCIUM PO Take by mouth 2 times daily      Denosumab (PROLIA SC) Inject into the skin every 6 months      anastrozole (ARIMIDEX) 1 MG tablet Take 1 tablet by mouth nightly      Cholecalciferol (VITAMIN D3) 2000 units CAPS Take by mouth daily        No current facility-administered medications for this visit.       Return in about 4 months (around 8/17/2024).

## 2024-04-18 NOTE — ASSESSMENT & PLAN NOTE
Recent LDL was 34 in September 2023.  Continue Crestor 10 mg daily.  Check lipid panel with next set of labs.

## 2024-04-18 NOTE — TELEPHONE ENCOUNTER
Overdue for colonoscopy. Referral placed to Dr. Waite.     Jeremiah Waite  [address] [facility] [address] Loc/POS:                Phone:   [phone]

## 2024-04-18 NOTE — ASSESSMENT & PLAN NOTE
Hemoglobin A1c has slightly worsened, up to 7.4%.  Increase Mounjaro to 5 mg weekly.  Continue Lantus 60 mg nightly, Jardiance 25 mg daily, metformin 800 mg twice daily, and glipizide 5 mg twice daily.  No hypoglycemia.    Addendum- received call from [facility]. Mounjaro 5 mg is not available, dose changed to 2.5 mg weekly.

## 2024-04-18 NOTE — TELEPHONE ENCOUNTER
Reviewed referral with patient. Stated she is not going to do a colonoscopy right now. Patient was informed PCP would like her to complete before the end of the year.

## 2024-04-22 ENCOUNTER — TELEPHONE (OUTPATIENT)
Dept: INTERNAL MEDICINE CLINIC | Age: 64
End: 2024-04-22
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
## 2024-04-30 RX ORDER — MUPIROCIN CALCIUM 20 MG/G
CREAM TOPICAL
Qty: 30 G | Refills: 1 | Status: SHIPPED | OUTPATIENT
Start: 2024-04-30

## 2024-05-06 DIAGNOSIS — R05.9 COUGH: ICD-10-CM

## 2024-05-06 DIAGNOSIS — J30.89 SEASONAL ALLERGIC RHINITIS DUE TO OTHER ALLERGIC TRIGGER: ICD-10-CM

## 2024-05-06 DIAGNOSIS — R06.7 SNEEZING: ICD-10-CM

## 2024-05-06 DIAGNOSIS — R09.81 NASAL CONGESTION: ICD-10-CM

## 2024-05-06 RX ORDER — FLUTICASONE PROPIONATE 50 MCG
SPRAY, SUSPENSION (ML) NASAL
Qty: 16 G | Refills: 2 | Status: SHIPPED | OUTPATIENT
Start: 2024-05-06

## 2024-06-19 ENCOUNTER — OFFICE VISIT (OUTPATIENT)
Dept: INTERNAL MEDICINE CLINIC | Age: 64
End: 2024-06-19
Payer: COMMERCIAL

## 2024-06-19 VITALS
BODY MASS INDEX: 25.76 KG/M2 | HEART RATE: 88 BPM | WEIGHT: 140 LBS | HEIGHT: 62 IN | SYSTOLIC BLOOD PRESSURE: 136 MMHG | OXYGEN SATURATION: 94 % | DIASTOLIC BLOOD PRESSURE: 60 MMHG

## 2024-06-19 DIAGNOSIS — J30.2 SEASONAL ALLERGIES: ICD-10-CM

## 2024-06-19 DIAGNOSIS — R04.0 EPISTAXIS: ICD-10-CM

## 2024-06-19 DIAGNOSIS — J06.9 URTI (ACUTE UPPER RESPIRATORY INFECTION): Primary | ICD-10-CM

## 2024-06-19 DIAGNOSIS — E11.9 TYPE 2 DIABETES MELLITUS WITHOUT COMPLICATION, WITHOUT LONG-TERM CURRENT USE OF INSULIN (HCC): ICD-10-CM

## 2024-06-19 DIAGNOSIS — I10 ESSENTIAL HYPERTENSION: ICD-10-CM

## 2024-06-19 PROCEDURE — 2022F DILAT RTA XM EVC RTNOPTHY: CPT | Performed by: INTERNAL MEDICINE

## 2024-06-19 PROCEDURE — 3075F SYST BP GE 130 - 139MM HG: CPT | Performed by: INTERNAL MEDICINE

## 2024-06-19 PROCEDURE — 3051F HG A1C>EQUAL 7.0%<8.0%: CPT | Performed by: INTERNAL MEDICINE

## 2024-06-19 PROCEDURE — 99214 OFFICE O/P EST MOD 30 MIN: CPT | Performed by: INTERNAL MEDICINE

## 2024-06-19 PROCEDURE — 1036F TOBACCO NON-USER: CPT | Performed by: INTERNAL MEDICINE

## 2024-06-19 PROCEDURE — 3017F COLORECTAL CA SCREEN DOC REV: CPT | Performed by: INTERNAL MEDICINE

## 2024-06-19 PROCEDURE — 3078F DIAST BP <80 MM HG: CPT | Performed by: INTERNAL MEDICINE

## 2024-06-19 PROCEDURE — G2211 COMPLEX E/M VISIT ADD ON: HCPCS | Performed by: INTERNAL MEDICINE

## 2024-06-19 PROCEDURE — G8427 DOCREV CUR MEDS BY ELIG CLIN: HCPCS | Performed by: INTERNAL MEDICINE

## 2024-06-19 PROCEDURE — G8419 CALC BMI OUT NRM PARAM NOF/U: HCPCS | Performed by: INTERNAL MEDICINE

## 2024-06-19 RX ORDER — ECHINACEA PURPUREA EXTRACT 125 MG
1 TABLET ORAL PRN
Qty: 1 EACH | Refills: 3 | Status: SHIPPED | OUTPATIENT
Start: 2024-06-19

## 2024-06-19 RX ORDER — CLINDAMYCIN HYDROCHLORIDE 300 MG/1
300 CAPSULE ORAL 3 TIMES DAILY
Qty: 21 CAPSULE | Refills: 0 | Status: SHIPPED | OUTPATIENT
Start: 2024-06-19 | End: 2024-06-26

## 2024-06-19 ASSESSMENT — ENCOUNTER SYMPTOMS
CHEST TIGHTNESS: 0
SWOLLEN GLANDS: 0
RHINORRHEA: 0
BLOOD IN STOOL: 0
DIARRHEA: 0
COUGH: 1
ABDOMINAL PAIN: 0
SINUS PAIN: 0
SORE THROAT: 0
SHORTNESS OF BREATH: 0
COLOR CHANGE: 0
VOMITING: 0
CONSTIPATION: 0
WHEEZING: 0

## 2024-06-19 NOTE — PROGRESS NOTES
Lab Results   Component Value Date/Time    LABA1C 7.4 04/15/2024 11:46 AM    LABA1C 7.1 2023 08:41 AM    LABA1C 6.8 2022 09:38 AM     Mario Chopra (:  1960) is a 63 y.o. female,Established patient, here for evaluation of the following chief complaint(s):  URI, Congestion, and Epistaxis      Assessment & Plan   ASSESSMENT/PLAN:  1. URTI (acute upper respiratory infection)  -     clindamycin (CLEOCIN) 300 MG capsule; Take 1 capsule by mouth 3 times daily for 7 days, Disp-21 capsule, R-0Normal  2. Essential hypertension  3. Type 2 diabetes mellitus without complication, without long-term current use of insulin ([facility])  4. Seasonal allergies  5. Epistaxis  -     sodium chloride (ALTAMIST SPRAY) 0.65 % nasal spray; 1 spray by Nasal route as needed for Congestion, Disp-1 each, R-3Normal  Patient symptoms are consistent with ethmoidal sinusitis at this point and after discussing the different options with patient she is interested in using clindamycin which worked for her in the past so a new prescription has been sent to the pharmacy we discussed the utilization of prednisone or not and she elected not to use it for the time being    Patient is advised to drink enough fluids and to watch her diabetes during her acute illness    Patient blood pressure is controlled and we will continue the same plan of care.    The patient is using cortisone no spray as well as an antihistamine I advised the patient to use also saline spray since some of her epistaxis could be a combination of the inflammation from the infection as well as dryness from the medications    No follow-ups on file.         Subjective   SUBJECTIVE/OBJECTIVE:    Lab Review   Lab Results   Component Value Date/Time     04/15/2024 11:46 AM     2023 08:41 AM     2023 07:17 AM    K 4.6 04/15/2024 11:46 AM    K 4.4 2023 08:41 AM    K 3.9 2023 07:17 AM    CO2 21 04/15/2024 11:46 AM    CO2 21 2023

## 2024-07-22 NOTE — PROGRESS NOTES
PCP:  Medical Oncology: [facility]  Radiation:  Other: [facility]        pT1cN0  STAGE:  IA left breast cancer      Ms. Chopra is a 63 y.o.-year-old woman who initially presented to me with  left breast cancer.  Since her last encounter Ms. Chopra has been doing quite well.  She did unfortunately have to have her expander removed due to an infection.  She has been interested in other reconstructive options in the past but has not decided to proceed.  There are no new breast related concerns today.     INTERVAL HISTORY:    On 4/18/2018 she underwent a left mastectomy with sentinel lymph node biopsy. Pathology identified 1.3 cm of grade 2 invasive ductal carcinoma with grade 3 DCIS. ER positive IN negative HER-2 negative (repeat testing). Margins were negative. There were 0/4 lymph nodes involved carcinoma.    On 3/29/2019 she underwent screening right breast mammogram. There are no concerning findings suggestive of malignancy. BI-RADS 1.    On 12/26/2019 she underwent ultrasound of the left breast wall for a palpable concern.  There is a benign-appearing cystic lesion noted in her area of palpable concern.  BI-RADS 2.    On 6/1/2020 she underwent unilateral right screening mammography.  The parenchymal pattern is stable.  There are no new concerning findings suggestive of malignancy.  BI-RADS 2.    On 6/14/2021 she underwent unilateral right screening mammography.  There were no concerning findings suggestive of malignancy.  BI-RADS 1.    On 6/20/2022 she underwent unilateral right screening mammography.  There were no concerning findings suggestive of malignancy.  BI-RADS 1.    On 7/27/2023 she underwent unilateral right screening mammography. There were no concerning findings suggestive of malignancy.  BI-RADS 1.    On 7/29/2025 she underwent unilateral right screening mammography.There were no concerning findings suggestive of malignancy.  BI-RADS 1.    Exam:  The physical exam has been reviewed and updated where

## 2024-07-29 ENCOUNTER — OFFICE VISIT (OUTPATIENT)
Dept: SURGERY | Age: 64
End: 2024-07-29
Payer: COMMERCIAL

## 2024-07-29 ENCOUNTER — HOSPITAL ENCOUNTER (OUTPATIENT)
Dept: WOMENS IMAGING | Age: 64
Discharge: HOME | End: 2024-07-29
Payer: COMMERCIAL

## 2024-07-29 VITALS — BODY MASS INDEX: 24.84 KG/M2 | WEIGHT: 135 LBS | HEIGHT: 62 IN

## 2024-07-29 VITALS — HEART RATE: 80 BPM | DIASTOLIC BLOOD PRESSURE: 72 MMHG | SYSTOLIC BLOOD PRESSURE: 144 MMHG | OXYGEN SATURATION: 92 %

## 2024-07-29 DIAGNOSIS — Z85.3 ENCOUNTER FOR FOLLOW-UP SURVEILLANCE OF BREAST CANCER: ICD-10-CM

## 2024-07-29 DIAGNOSIS — Z12.31 SCREENING MAMMOGRAM, ENCOUNTER FOR: Primary | ICD-10-CM

## 2024-07-29 DIAGNOSIS — Z12.31 ENCOUNTER FOR SCREENING MAMMOGRAM FOR MALIGNANT NEOPLASM OF BREAST: ICD-10-CM

## 2024-07-29 DIAGNOSIS — Z85.3 PERSONAL HISTORY OF BREAST CANCER: Primary | ICD-10-CM

## 2024-07-29 DIAGNOSIS — Z08 ENCOUNTER FOR FOLLOW-UP SURVEILLANCE OF BREAST CANCER: ICD-10-CM

## 2024-07-29 DIAGNOSIS — Z12.31 SCREENING MAMMOGRAM, ENCOUNTER FOR: ICD-10-CM

## 2024-07-29 DIAGNOSIS — Z09 SURGICAL FOLLOW-UP CARE: ICD-10-CM

## 2024-07-29 DIAGNOSIS — Z12.39 SCREENING BREAST EXAMINATION: ICD-10-CM

## 2024-07-29 DIAGNOSIS — Z85.3 PERSONAL HISTORY OF BREAST CANCER: ICD-10-CM

## 2024-07-29 PROCEDURE — 3078F DIAST BP <80 MM HG: CPT | Performed by: SURGERY

## 2024-07-29 PROCEDURE — 1036F TOBACCO NON-USER: CPT | Performed by: SURGERY

## 2024-07-29 PROCEDURE — G8420 CALC BMI NORM PARAMETERS: HCPCS | Performed by: SURGERY

## 2024-07-29 PROCEDURE — 77063 BREAST TOMOSYNTHESIS BI: CPT

## 2024-07-29 PROCEDURE — G8427 DOCREV CUR MEDS BY ELIG CLIN: HCPCS | Performed by: SURGERY

## 2024-07-29 PROCEDURE — 99214 OFFICE O/P EST MOD 30 MIN: CPT | Performed by: SURGERY

## 2024-07-29 PROCEDURE — 3017F COLORECTAL CA SCREEN DOC REV: CPT | Performed by: SURGERY

## 2024-07-29 PROCEDURE — 3077F SYST BP >= 140 MM HG: CPT | Performed by: SURGERY

## 2024-08-12 RX ORDER — TIRZEPATIDE 5 MG/.5ML
INJECTION, SOLUTION SUBCUTANEOUS
Qty: 2 ML | Refills: 1 | Status: SHIPPED | OUTPATIENT
Start: 2024-08-12

## 2024-08-13 DIAGNOSIS — E11.9 TYPE 2 DIABETES MELLITUS WITHOUT COMPLICATION, WITHOUT LONG-TERM CURRENT USE OF INSULIN (HCC): ICD-10-CM

## 2024-08-13 DIAGNOSIS — M81.0 AGE-RELATED OSTEOPOROSIS WITHOUT CURRENT PATHOLOGICAL FRACTURE: ICD-10-CM

## 2024-08-13 DIAGNOSIS — E78.5 HYPERLIPIDEMIA LDL GOAL <70: ICD-10-CM

## 2024-08-13 DIAGNOSIS — I10 ESSENTIAL HYPERTENSION: ICD-10-CM

## 2024-08-13 LAB
25(OH)D3 SERPL-MCNC: 26.6 NG/ML
ALBUMIN SERPL-MCNC: 4.4 G/DL (ref 3.4–5)
ALBUMIN/GLOB SERPL: 1.8 {RATIO} (ref 1.1–2.2)
ALP SERPL-CCNC: 128 U/L (ref 40–129)
ALT SERPL-CCNC: 11 U/L (ref 10–40)
ANION GAP SERPL CALCULATED.3IONS-SCNC: 10 MMOL/L (ref 3–16)
AST SERPL-CCNC: 20 U/L (ref 15–37)
BASOPHILS # BLD: 0 K/UL (ref 0–0.2)
BASOPHILS NFR BLD: 0.5 %
BILIRUB SERPL-MCNC: 0.5 MG/DL (ref 0–1)
BUN SERPL-MCNC: 14 MG/DL (ref 7–20)
CALCIUM SERPL-MCNC: 9.8 MG/DL (ref 8.3–10.6)
CHLORIDE SERPL-SCNC: 107 MMOL/L (ref 99–110)
CHOLEST SERPL-MCNC: 108 MG/DL (ref 0–199)
CO2 SERPL-SCNC: 24 MMOL/L (ref 21–32)
CREAT SERPL-MCNC: 1 MG/DL (ref 0.6–1.2)
CREAT UR-MCNC: 34.1 MG/DL (ref 28–259)
DEPRECATED RDW RBC AUTO: 14.1 % (ref 12.4–15.4)
EOSINOPHIL # BLD: 0.2 K/UL (ref 0–0.6)
EOSINOPHIL NFR BLD: 4.3 %
EST. AVERAGE GLUCOSE BLD GHB EST-MCNC: 128.4 MG/DL
GFR SERPLBLD CREATININE-BSD FMLA CKD-EPI: 63 ML/MIN/{1.73_M2}
GLUCOSE SERPL-MCNC: 90 MG/DL (ref 70–99)
HBA1C MFR BLD: 6.1 %
HCT VFR BLD AUTO: 34.4 % (ref 36–48)
HDLC SERPL-MCNC: 31 MG/DL (ref 40–60)
HGB BLD-MCNC: 11.8 G/DL (ref 12–16)
LDLC SERPL CALC-MCNC: 46 MG/DL
LYMPHOCYTES # BLD: 1.5 K/UL (ref 1–5.1)
LYMPHOCYTES NFR BLD: 29.2 %
MCH RBC QN AUTO: 31 PG (ref 26–34)
MCHC RBC AUTO-ENTMCNC: 34.3 G/DL (ref 31–36)
MCV RBC AUTO: 90.5 FL (ref 80–100)
MICROALBUMIN UR DL<=1MG/L-MCNC: 1.25 MG/DL
MICROALBUMIN/CREAT UR: 36.7 MG/G (ref 0–30)
MONOCYTES # BLD: 0.5 K/UL (ref 0–1.3)
MONOCYTES NFR BLD: 8.9 %
NEUTROPHILS # BLD: 3 K/UL (ref 1.7–7.7)
NEUTROPHILS NFR BLD: 57.1 %
PLATELET # BLD AUTO: 212 K/UL (ref 135–450)
PMV BLD AUTO: 8.9 FL (ref 5–10.5)
POTASSIUM SERPL-SCNC: 3.9 MMOL/L (ref 3.5–5.1)
PROT SERPL-MCNC: 6.9 G/DL (ref 6.4–8.2)
RBC # BLD AUTO: 3.81 M/UL (ref 4–5.2)
SODIUM SERPL-SCNC: 141 MMOL/L (ref 136–145)
TRIGL SERPL-MCNC: 156 MG/DL (ref 0–150)
VLDLC SERPL CALC-MCNC: 31 MG/DL
WBC # BLD AUTO: 5.3 K/UL (ref 4–11)

## 2024-08-15 ENCOUNTER — OFFICE VISIT (OUTPATIENT)
Dept: INTERNAL MEDICINE CLINIC | Age: 64
End: 2024-08-15
Payer: COMMERCIAL

## 2024-08-15 VITALS
OXYGEN SATURATION: 97 % | HEART RATE: 83 BPM | HEIGHT: 62 IN | SYSTOLIC BLOOD PRESSURE: 144 MMHG | WEIGHT: 141.2 LBS | DIASTOLIC BLOOD PRESSURE: 72 MMHG | BODY MASS INDEX: 25.98 KG/M2

## 2024-08-15 DIAGNOSIS — E78.5 HYPERLIPIDEMIA LDL GOAL <70: ICD-10-CM

## 2024-08-15 DIAGNOSIS — Z12.11 COLON CANCER SCREENING: ICD-10-CM

## 2024-08-15 DIAGNOSIS — E11.9 TYPE 2 DIABETES MELLITUS WITHOUT COMPLICATION, WITHOUT LONG-TERM CURRENT USE OF INSULIN (HCC): Primary | ICD-10-CM

## 2024-08-15 DIAGNOSIS — D12.6 TUBULAR ADENOMA OF COLON: ICD-10-CM

## 2024-08-15 DIAGNOSIS — I10 ESSENTIAL HYPERTENSION: ICD-10-CM

## 2024-08-15 DIAGNOSIS — F41.9 ANXIETY: ICD-10-CM

## 2024-08-15 DIAGNOSIS — Z85.3 HISTORY OF BREAST CANCER: ICD-10-CM

## 2024-08-15 DIAGNOSIS — M81.0 AGE-RELATED OSTEOPOROSIS WITHOUT CURRENT PATHOLOGICAL FRACTURE: ICD-10-CM

## 2024-08-15 DIAGNOSIS — D64.9 NORMOCYTIC ANEMIA: ICD-10-CM

## 2024-08-15 PROCEDURE — 3077F SYST BP >= 140 MM HG: CPT | Performed by: INTERNAL MEDICINE

## 2024-08-15 PROCEDURE — 1036F TOBACCO NON-USER: CPT | Performed by: INTERNAL MEDICINE

## 2024-08-15 PROCEDURE — 3017F COLORECTAL CA SCREEN DOC REV: CPT | Performed by: INTERNAL MEDICINE

## 2024-08-15 PROCEDURE — 2022F DILAT RTA XM EVC RTNOPTHY: CPT | Performed by: INTERNAL MEDICINE

## 2024-08-15 PROCEDURE — 99214 OFFICE O/P EST MOD 30 MIN: CPT | Performed by: INTERNAL MEDICINE

## 2024-08-15 PROCEDURE — G8427 DOCREV CUR MEDS BY ELIG CLIN: HCPCS | Performed by: INTERNAL MEDICINE

## 2024-08-15 PROCEDURE — 3078F DIAST BP <80 MM HG: CPT | Performed by: INTERNAL MEDICINE

## 2024-08-15 PROCEDURE — G8419 CALC BMI OUT NRM PARAM NOF/U: HCPCS | Performed by: INTERNAL MEDICINE

## 2024-08-15 PROCEDURE — 3044F HG A1C LEVEL LT 7.0%: CPT | Performed by: INTERNAL MEDICINE

## 2024-08-15 NOTE — PROGRESS NOTES
Patient: Mario Chopra is a 63 y.o. female who presents today with the following Chief Complaint(s):  Chief Complaint   Patient presents with    4 month follow up      No questions at this time        HPI    Here today for follow up.     DM- has been trying to exercise more, watching her diet, making better food choices. Doing well on Mounjaro 5 mg weekly. No side effects. No low sugars.     Vit D-  taking OTC D3, not sure of dose.     HTN- was a little nervous when she got here today. At home, BP is running 130/70 on average.     HLD- no issues with Crestor.     Seeing Dr. Jha for breast cancer surveillance. Last saw her last week. Mammogram was ok. Also following with Dr. Rice.     Does get Prolia injections from Dr. Rice. Next injection is scheduled for December.       Results for orders placed or performed in visit on 08/13/24   Vitamin D 25 Hydroxy   Result Value Ref Range    Vit D, 25-Hydroxy 26.6 (L) >=30 ng/mL   Lipid Panel   Result Value Ref Range    Cholesterol, Total 108 0 - 199 mg/dL    Triglycerides 156 (H) 0 - 150 mg/dL    HDL 31 (L) 40 - 60 mg/dL    LDL Cholesterol 46 <100 mg/dL    VLDL Cholesterol Calculated 31 Not Established mg/dL   CBC with Auto Differential   Result Value Ref Range    WBC 5.3 4.0 - 11.0 K/uL    RBC 3.81 (L) 4.00 - 5.20 M/uL    Hemoglobin 11.8 (L) 12.0 - 16.0 g/dL    Hematocrit 34.4 (L) 36.0 - 48.0 %    MCV 90.5 80.0 - 100.0 fL    MCH 31.0 26.0 - 34.0 pg    MCHC 34.3 31.0 - 36.0 g/dL    RDW 14.1 12.4 - 15.4 %    Platelets 212 135 - 450 K/uL    MPV 8.9 5.0 - 10.5 fL    Neutrophils % 57.1 %    Lymphocytes % 29.2 %    Monocytes % 8.9 %    Eosinophils % 4.3 %    Basophils % 0.5 %    Neutrophils Absolute 3.0 1.7 - 7.7 K/uL    Lymphocytes Absolute 1.5 1.0 - 5.1 K/uL    Monocytes Absolute 0.5 0.0 - 1.3 K/uL    Eosinophils Absolute 0.2 0.0 - 0.6 K/uL    Basophils Absolute 0.0 0.0 - 0.2 K/uL   Microalbumin / Creatinine Urine Ratio   Result Value Ref Range    Microalb, Ur 1.25 <2.0

## 2024-08-17 NOTE — ASSESSMENT & PLAN NOTE
Remains on Anastrozole for breast cancer secondary prevention. Recommended to take x 10 years (has been taking for 8 years).  Continues to follow with Dr. Jha breast exams and mammograms and Dr. Rice for oncology.

## 2024-08-17 NOTE — ASSESSMENT & PLAN NOTE
On Prolia which is managed by oncology (Dr. Rice).  Related to anastrozole.  Vitamin D level is low.  Recommend over-the-counter vitamin D3 5000 units daily.

## 2024-08-17 NOTE — ASSESSMENT & PLAN NOTE
Well-controlled with hemoglobin A1c of 6.1%.  Continue Mounjaro 5 mg weekly, Lantus 60 units nightly, Jardiance 25 mg daily, and metformin 800 mg twice daily.  Discontinue glipizide.

## 2024-09-01 DIAGNOSIS — R09.81 NASAL CONGESTION: ICD-10-CM

## 2024-09-01 DIAGNOSIS — J30.89 SEASONAL ALLERGIC RHINITIS DUE TO OTHER ALLERGIC TRIGGER: ICD-10-CM

## 2024-09-01 DIAGNOSIS — R05.9 COUGH: ICD-10-CM

## 2024-09-01 DIAGNOSIS — R06.7 SNEEZING: ICD-10-CM

## 2024-09-03 RX ORDER — FLUTICASONE PROPIONATE 50 MCG
SPRAY, SUSPENSION (ML) NASAL
Qty: 16 G | Refills: 2 | Status: SHIPPED | OUTPATIENT
Start: 2024-09-03

## 2024-09-24 DIAGNOSIS — I10 ESSENTIAL HYPERTENSION: ICD-10-CM

## 2024-09-24 RX ORDER — VALSARTAN 320 MG/1
320 TABLET ORAL DAILY
Qty: 90 TABLET | Refills: 1 | Status: SHIPPED | OUTPATIENT
Start: 2024-09-24

## 2024-10-08 RX ORDER — TIRZEPATIDE 5 MG/.5ML
INJECTION, SOLUTION SUBCUTANEOUS
Qty: 6 ML | Refills: 1 | Status: SHIPPED | OUTPATIENT
Start: 2024-10-08

## 2024-10-08 NOTE — TELEPHONE ENCOUNTER
Patient came into office about medication refill of Monjaro. Patient would like refill to be sent to [facility] pharmacy. Please advise       Tirzepatide (MOUNJARO) 5 MG/0.5ML SOPN SC injection      [id number] - [place], [place] - [address] - P [phone] - F [phone] [address] [address]  Phone: [phone]  Fax: [phone]

## 2024-10-18 ENCOUNTER — OFFICE VISIT (OUTPATIENT)
Dept: INTERNAL MEDICINE CLINIC | Age: 64
End: 2024-10-18
Payer: COMMERCIAL

## 2024-10-18 VITALS — DIASTOLIC BLOOD PRESSURE: 72 MMHG | SYSTOLIC BLOOD PRESSURE: 138 MMHG | OXYGEN SATURATION: 97 % | HEART RATE: 76 BPM

## 2024-10-18 DIAGNOSIS — J20.9 ACUTE BRONCHITIS, UNSPECIFIED ORGANISM: Primary | ICD-10-CM

## 2024-10-18 DIAGNOSIS — J30.2 SEASONAL ALLERGIC RHINITIS, UNSPECIFIED TRIGGER: ICD-10-CM

## 2024-10-18 DIAGNOSIS — R09.81 NASAL CONGESTION: ICD-10-CM

## 2024-10-18 DIAGNOSIS — J06.9 URTI (ACUTE UPPER RESPIRATORY INFECTION): ICD-10-CM

## 2024-10-18 LAB
Lab: 9753
QC PASS/FAIL: NORMAL
SARS-COV-2 RDRP RESP QL NAA+PROBE: NEGATIVE

## 2024-10-18 PROCEDURE — 3078F DIAST BP <80 MM HG: CPT | Performed by: INTERNAL MEDICINE

## 2024-10-18 PROCEDURE — 1036F TOBACCO NON-USER: CPT | Performed by: INTERNAL MEDICINE

## 2024-10-18 PROCEDURE — 99213 OFFICE O/P EST LOW 20 MIN: CPT | Performed by: INTERNAL MEDICINE

## 2024-10-18 PROCEDURE — G8484 FLU IMMUNIZE NO ADMIN: HCPCS | Performed by: INTERNAL MEDICINE

## 2024-10-18 PROCEDURE — 3075F SYST BP GE 130 - 139MM HG: CPT | Performed by: INTERNAL MEDICINE

## 2024-10-18 PROCEDURE — G8419 CALC BMI OUT NRM PARAM NOF/U: HCPCS | Performed by: INTERNAL MEDICINE

## 2024-10-18 PROCEDURE — G8427 DOCREV CUR MEDS BY ELIG CLIN: HCPCS | Performed by: INTERNAL MEDICINE

## 2024-10-18 PROCEDURE — 3017F COLORECTAL CA SCREEN DOC REV: CPT | Performed by: INTERNAL MEDICINE

## 2024-10-18 PROCEDURE — 87635 SARS-COV-2 COVID-19 AMP PRB: CPT | Performed by: INTERNAL MEDICINE

## 2024-10-18 RX ORDER — GUAIFENESIN 600 MG/1
600 TABLET, EXTENDED RELEASE ORAL 2 TIMES DAILY
Qty: 30 TABLET | Refills: 0 | Status: SHIPPED | OUTPATIENT
Start: 2024-10-18 | End: 2024-11-02

## 2024-10-18 RX ORDER — SULFAMETHOXAZOLE/TRIMETHOPRIM 800-160 MG
1 TABLET ORAL 2 TIMES DAILY
Qty: 20 TABLET | Refills: 0 | Status: SHIPPED | OUTPATIENT
Start: 2024-10-18 | End: 2024-10-28

## 2024-10-18 RX ORDER — BENZONATATE 100 MG/1
100 CAPSULE ORAL 3 TIMES DAILY PRN
Qty: 30 CAPSULE | Refills: 0 | Status: SHIPPED | OUTPATIENT
Start: 2024-10-18 | End: 2024-10-28

## 2024-10-18 ASSESSMENT — ENCOUNTER SYMPTOMS
COUGH: 1
WHEEZING: 0
PHOTOPHOBIA: 0
SHORTNESS OF BREATH: 0
VOICE CHANGE: 0
SORE THROAT: 1
TROUBLE SWALLOWING: 0
RHINORRHEA: 1

## 2024-10-18 NOTE — PROGRESS NOTES
Mario MENDEZ Bippus  1960  female  63 y.o.    SUBJECTIVE:    Chief Complaint   Patient presents with    Cough     Yellow phlem     Congestion     Sneezing        HPI:  Patient has been complaining of cough with productive sputum, nasal congestion and sneezing and sore throat for the last 3 to 4 days.  Patient has history of year-round allergies.  She believes because of weather change she is having all the symptoms.  She has been using Claritin, Singulair, Flonase nasal spray without any significant improvement of symptoms.  History of diabetes mellitus.  She does not check blood glucose regularly however she is pretty confident her blood sugar has been excellent.  Her last A1c was 6.1 about 2 months ago    Past Medical History:   Diagnosis Date    Allergic rhinitis, cause unspecified     Breast cancer (HCC)     Cancer (HCC)     left breast    Closed fracture of left distal radius 02/16/2023    Displaced fracture of distal phalanx of left great toe, initial encounter for open fracture 02/16/2023    Generalized osteoarthrosis, involving multiple sites     Pure hypercholesterolemia     Pure hyperglyceridemia     Type II or unspecified type diabetes mellitus without mention of complication, uncontrolled     Unspecified essential hypertension      Past Surgical History:   Procedure Laterality Date    CHOLECYSTECTOMY, LAPAROSCOPIC N/A 12/19/2018    LAPAROSCOPIC CHOLECYSTECTOMY; CHOLANGIOGRAM performed by Lalito Dickerson MD at [facility] OR    COLONOSCOPY      colonoscopy ordered 6/2010; 5/2011; 11/2011, recommended again and refused at this time on 3/30/12 & 4/2012     COLONOSCOPY N/A 11/29/2018    COLONOSCOPY POLYPECTOMY SNARE/COLD BIOPSY performed by Edil Estrada MD at [facility] ENDOSCOPY    FOREARM SURGERY Left 2/16/2023    OPEN REDUCTION INTERNAL FIXATION LEFT DISTAL RADIUS-MARY performed by Gabriela Henderson MD at [facility] OR    MASTECTOMY Left 04/18/2019    Skin sparing mastectomy Sentinal Lymph node Bx Tissue

## 2024-10-22 ENCOUNTER — TELEPHONE (OUTPATIENT)
Dept: INTERNAL MEDICINE CLINIC | Age: 64
End: 2024-10-22

## 2024-10-22 RX ORDER — CLINDAMYCIN HYDROCHLORIDE 300 MG/1
300 CAPSULE ORAL 3 TIMES DAILY
Qty: 30 CAPSULE | Refills: 0 | Status: SHIPPED | OUTPATIENT
Start: 2024-10-22 | End: 2024-11-01

## 2024-10-22 NOTE — TELEPHONE ENCOUNTER
Spoke to patient, gave message. Voiced understanding. Is it ok to still take the Mucous relief that was prescribed by Dr. Caceres?

## 2024-10-22 NOTE — TELEPHONE ENCOUNTER
Pt saw  on 10/18/24 for an acute visit and was prescribed Bactrim for bronchitis. Pt has been experiencing nausea, rash to stomach, redness to face. She states she has never taken that med before. She is asking if that can be changed to clindamycin which has worked well for her in the past - she is allergic to several antibiotics. [facility] preferred pharmacy. Please let pt know if that med is ok and when it is sent to pharmacy.

## 2024-10-22 NOTE — TELEPHONE ENCOUNTER
Yes, stop Bactrim and add to her allergy list.   Clindamycin sent to pharmacy.     Take Benadryl for rash and itching.

## 2024-11-27 RX ORDER — INSULIN GLARGINE 100 [IU]/ML
INJECTION, SOLUTION SUBCUTANEOUS
Qty: 15 ML | Refills: 5 | Status: SHIPPED | OUTPATIENT
Start: 2024-11-27

## 2024-12-03 RX ORDER — FLURBIPROFEN SODIUM 0.3 MG/ML
SOLUTION/ DROPS OPHTHALMIC
Qty: 100 EACH | Refills: 3 | Status: SHIPPED | OUTPATIENT
Start: 2024-12-03

## 2024-12-06 DIAGNOSIS — E78.5 HYPERLIPIDEMIA LDL GOAL <70: ICD-10-CM

## 2024-12-09 RX ORDER — ROSUVASTATIN CALCIUM 10 MG/1
10 TABLET, COATED ORAL DAILY
Qty: 90 TABLET | Refills: 1 | Status: SHIPPED | OUTPATIENT
Start: 2024-12-09

## 2024-12-15 DIAGNOSIS — R09.81 NASAL CONGESTION: ICD-10-CM

## 2024-12-15 DIAGNOSIS — J30.2 SEASONAL ALLERGIES: ICD-10-CM

## 2024-12-15 DIAGNOSIS — J30.89 SEASONAL ALLERGIC RHINITIS DUE TO OTHER ALLERGIC TRIGGER: ICD-10-CM

## 2024-12-15 DIAGNOSIS — R05.9 COUGH: ICD-10-CM

## 2024-12-15 DIAGNOSIS — R06.7 SNEEZING: ICD-10-CM

## 2024-12-16 RX ORDER — LORATADINE 10 MG/1
TABLET ORAL
Qty: 90 TABLET | Refills: 3 | Status: SHIPPED | OUTPATIENT
Start: 2024-12-16 | End: 2024-12-18 | Stop reason: SDUPTHER

## 2024-12-16 RX ORDER — MONTELUKAST SODIUM 10 MG/1
10 TABLET ORAL NIGHTLY
Qty: 90 TABLET | Refills: 1 | Status: SHIPPED | OUTPATIENT
Start: 2024-12-16 | End: 2024-12-18 | Stop reason: SDUPTHER

## 2024-12-18 ENCOUNTER — OFFICE VISIT (OUTPATIENT)
Dept: INTERNAL MEDICINE CLINIC | Age: 64
End: 2024-12-18
Payer: COMMERCIAL

## 2024-12-18 VITALS
WEIGHT: 141.6 LBS | SYSTOLIC BLOOD PRESSURE: 122 MMHG | HEIGHT: 62 IN | DIASTOLIC BLOOD PRESSURE: 60 MMHG | OXYGEN SATURATION: 97 % | BODY MASS INDEX: 26.06 KG/M2 | HEART RATE: 74 BPM

## 2024-12-18 DIAGNOSIS — E11.9 TYPE 2 DIABETES MELLITUS WITHOUT COMPLICATION, WITHOUT LONG-TERM CURRENT USE OF INSULIN (HCC): Primary | ICD-10-CM

## 2024-12-18 DIAGNOSIS — I10 ESSENTIAL HYPERTENSION: ICD-10-CM

## 2024-12-18 DIAGNOSIS — E11.9 TYPE 2 DIABETES MELLITUS WITHOUT COMPLICATION, WITHOUT LONG-TERM CURRENT USE OF INSULIN (HCC): ICD-10-CM

## 2024-12-18 DIAGNOSIS — J30.2 SEASONAL ALLERGIES: ICD-10-CM

## 2024-12-18 DIAGNOSIS — D64.9 NORMOCYTIC ANEMIA: ICD-10-CM

## 2024-12-18 LAB
ALBUMIN SERPL-MCNC: 4.4 G/DL (ref 3.4–5)
ALBUMIN/GLOB SERPL: 1.6 {RATIO} (ref 1.1–2.2)
ALP SERPL-CCNC: 109 U/L (ref 40–129)
ALT SERPL-CCNC: 24 U/L (ref 10–40)
ANION GAP SERPL CALCULATED.3IONS-SCNC: 10 MMOL/L (ref 3–16)
AST SERPL-CCNC: 31 U/L (ref 15–37)
BASOPHILS # BLD: 0 K/UL (ref 0–0.2)
BASOPHILS NFR BLD: 0.6 %
BILIRUB SERPL-MCNC: 0.7 MG/DL (ref 0–1)
BUN SERPL-MCNC: 11 MG/DL (ref 7–20)
CALCIUM SERPL-MCNC: 9.4 MG/DL (ref 8.3–10.6)
CHLORIDE SERPL-SCNC: 106 MMOL/L (ref 99–110)
CO2 SERPL-SCNC: 23 MMOL/L (ref 21–32)
CREAT SERPL-MCNC: 0.7 MG/DL (ref 0.6–1.2)
DEPRECATED RDW RBC AUTO: 14.3 % (ref 12.4–15.4)
EOSINOPHIL # BLD: 0.1 K/UL (ref 0–0.6)
EOSINOPHIL NFR BLD: 2.2 %
GFR SERPLBLD CREATININE-BSD FMLA CKD-EPI: >90 ML/MIN/{1.73_M2}
GLUCOSE SERPL-MCNC: 61 MG/DL (ref 70–99)
HBA1C MFR BLD: 5.8 %
HCT VFR BLD AUTO: 38 % (ref 36–48)
HGB BLD-MCNC: 12.7 G/DL (ref 12–16)
LYMPHOCYTES # BLD: 1.4 K/UL (ref 1–5.1)
LYMPHOCYTES NFR BLD: 23 %
MCH RBC QN AUTO: 30.2 PG (ref 26–34)
MCHC RBC AUTO-ENTMCNC: 33.5 G/DL (ref 31–36)
MCV RBC AUTO: 90.4 FL (ref 80–100)
MONOCYTES # BLD: 0.4 K/UL (ref 0–1.3)
MONOCYTES NFR BLD: 7.5 %
NEUTROPHILS # BLD: 4 K/UL (ref 1.7–7.7)
NEUTROPHILS NFR BLD: 66.7 %
PLATELET # BLD AUTO: 239 K/UL (ref 135–450)
PMV BLD AUTO: 8.8 FL (ref 5–10.5)
POTASSIUM SERPL-SCNC: 4.3 MMOL/L (ref 3.5–5.1)
PROT SERPL-MCNC: 7.2 G/DL (ref 6.4–8.2)
RBC # BLD AUTO: 4.2 M/UL (ref 4–5.2)
SODIUM SERPL-SCNC: 139 MMOL/L (ref 136–145)
WBC # BLD AUTO: 6 K/UL (ref 4–11)

## 2024-12-18 PROCEDURE — 3017F COLORECTAL CA SCREEN DOC REV: CPT | Performed by: INTERNAL MEDICINE

## 2024-12-18 PROCEDURE — 99214 OFFICE O/P EST MOD 30 MIN: CPT | Performed by: INTERNAL MEDICINE

## 2024-12-18 PROCEDURE — 90471 IMMUNIZATION ADMIN: CPT | Performed by: INTERNAL MEDICINE

## 2024-12-18 PROCEDURE — G8484 FLU IMMUNIZE NO ADMIN: HCPCS | Performed by: INTERNAL MEDICINE

## 2024-12-18 PROCEDURE — 3074F SYST BP LT 130 MM HG: CPT | Performed by: INTERNAL MEDICINE

## 2024-12-18 PROCEDURE — 3078F DIAST BP <80 MM HG: CPT | Performed by: INTERNAL MEDICINE

## 2024-12-18 PROCEDURE — 83036 HEMOGLOBIN GLYCOSYLATED A1C: CPT | Performed by: INTERNAL MEDICINE

## 2024-12-18 PROCEDURE — G8427 DOCREV CUR MEDS BY ELIG CLIN: HCPCS | Performed by: INTERNAL MEDICINE

## 2024-12-18 PROCEDURE — G8419 CALC BMI OUT NRM PARAM NOF/U: HCPCS | Performed by: INTERNAL MEDICINE

## 2024-12-18 PROCEDURE — 3044F HG A1C LEVEL LT 7.0%: CPT | Performed by: INTERNAL MEDICINE

## 2024-12-18 PROCEDURE — 2022F DILAT RTA XM EVC RTNOPTHY: CPT | Performed by: INTERNAL MEDICINE

## 2024-12-18 PROCEDURE — 90677 PCV20 VACCINE IM: CPT | Performed by: INTERNAL MEDICINE

## 2024-12-18 PROCEDURE — 1036F TOBACCO NON-USER: CPT | Performed by: INTERNAL MEDICINE

## 2024-12-18 RX ORDER — MONTELUKAST SODIUM 10 MG/1
10 TABLET ORAL NIGHTLY
Qty: 90 TABLET | Refills: 1 | Status: SHIPPED | OUTPATIENT
Start: 2024-12-18

## 2024-12-18 RX ORDER — VALSARTAN 320 MG/1
320 TABLET ORAL DAILY
Qty: 90 TABLET | Refills: 1 | Status: SHIPPED | OUTPATIENT
Start: 2024-12-18

## 2024-12-18 RX ORDER — LORATADINE 10 MG/1
10 TABLET ORAL DAILY
Qty: 90 TABLET | Refills: 3 | Status: SHIPPED | OUTPATIENT
Start: 2024-12-18

## 2024-12-18 NOTE — PROGRESS NOTES
heard.  Pulmonary:      Effort: Pulmonary effort is normal.      Breath sounds: Normal breath sounds.   Musculoskeletal:      Right lower leg: No edema.      Left lower leg: No edema.   Lymphadenopathy:      Cervical: No cervical adenopathy.   Skin:     Nails: There is no clubbing.   Neurological:      General: No focal deficit present.      Mental Status: She is alert and oriented to person, place, and time.   Psychiatric:         Mood and Affect: Mood normal.         Behavior: Behavior normal. Behavior is cooperative.         ASSESSMENT/PLAN:    Problem List Items Addressed This Visit       Essential hypertension      Well-controlled.  Continue Ziac 5/25 mg daily and  valsartan 320 mg daily.         Relevant Medications    valsartan (DIOVAN) 320 MG tablet    Other Relevant Orders    CBC with Auto Differential    Seasonal allergies      Continue Claritin, Singulair, Flonase and Astelin.         Relevant Medications    montelukast (SINGULAIR) 10 MG tablet    Type 2 diabetes mellitus without complication, without long-term current use of insulin ([facility]) - Primary      Well-controlled with POCT hemoglobin A1c of 5.8%.  Continue Mounjaro 5 mg weekly, Lantus 60 units nightly, and Jardiance 25 mg daily.  Discontinue metformin.  Consider weaning Lantus if her next hemoglobin A1c remains around 6.0% or lower.         Relevant Orders    CBC with Auto Differential    Hemoglobin A1C    Comprehensive Metabolic Panel    Lipid Panel    POCT glycosylated hemoglobin (Hb A1C) (Completed)     DIABETES FOOT EXAM (Completed)       Current Outpatient Medications   Medication Sig Dispense Refill    loratadine (CLARITIN) 10 MG tablet Take 1 tablet by mouth daily 90 tablet 3    montelukast (SINGULAIR) 10 MG tablet Take 1 tablet by mouth nightly 90 tablet 1    valsartan (DIOVAN) 320 MG tablet Take 1 tablet by mouth daily 90 tablet 1    rosuvastatin (CRESTOR) 10 MG tablet TAKE 1 TABLET BY MOUTH DAILY 90 tablet 1    B-D UF III MINI PEN

## 2024-12-24 NOTE — ASSESSMENT & PLAN NOTE
Well-controlled with POCT hemoglobin A1c of 5.8%.  Continue Mounjaro 5 mg weekly, Lantus 60 units nightly, and Jardiance 25 mg daily.  Discontinue metformin.  Consider weaning Lantus if her next hemoglobin A1c remains around 6.0% or lower.

## 2025-01-09 ENCOUNTER — TELEPHONE (OUTPATIENT)
Dept: INTERNAL MEDICINE CLINIC | Age: 65
End: 2025-01-09

## 2025-01-09 NOTE — TELEPHONE ENCOUNTER
Pt calling, states she has congestion with some blood in her mucus. Pt is asking what OTC medications she can try. Pt states this is day 3 of symptom. Please advise and call pt. Aware Dr. Sinclair is out of the office.

## 2025-01-10 NOTE — TELEPHONE ENCOUNTER
Is the blood in her nasal drainage or is she coughing up blood?     For cold symptoms and congestion, you may safely try nasal saline,  Mucinex or Coridin HBP. Try to avoid medications labeled \"cold and sinus\" as these may raise your blood pressure or heart rate. If you really need something more, may try Sudafed 30 mg once daily.

## 2025-01-13 DIAGNOSIS — E11.9 TYPE 2 DIABETES MELLITUS WITHOUT COMPLICATION, WITHOUT LONG-TERM CURRENT USE OF INSULIN (HCC): Primary | ICD-10-CM

## 2025-01-13 NOTE — TELEPHONE ENCOUNTER
Medication:   Requested Prescriptions     Pending Prescriptions Disp Refills    Tirzepatide 5 MG/0.5ML SOAJ 2 mL 0     Sig: Inject 5 mg into the skin every 7 days        Last Filled:  10/8/24    Patient Phone Number: [phone] (home)     Last appt: 12/18/2024   Next appt: 4/21/2025    Last OARRS:        No data to display

## 2025-01-16 ENCOUNTER — TELEPHONE (OUTPATIENT)
Dept: INTERNAL MEDICINE CLINIC | Age: 65
End: 2025-01-16

## 2025-01-16 NOTE — TELEPHONE ENCOUNTER
Pt calling, needs PA for Tirzepatide 5 MG/0.5ML SOAJ. Please advise and call pt once response is received.

## 2025-01-17 NOTE — TELEPHONE ENCOUNTER
Submitted PA for Tirzepatide 5 MG/0.5ML SOAJ   Via CMM (Key: [id number]) STATUS: PENDING.    Follow up done daily; if no decision with in three days we will refax.  If another three days goes by with no decision will call the insurance for status.

## 2025-01-20 NOTE — TELEPHONE ENCOUNTER
The medication is APPROVED thru 12/31/25    If this requires a response please respond to the pool ( P MHCX P[facility] MEDICATION PRE-AUTH).      Thank you please advise patient.

## 2025-01-29 DIAGNOSIS — J30.89 SEASONAL ALLERGIC RHINITIS DUE TO OTHER ALLERGIC TRIGGER: ICD-10-CM

## 2025-01-29 DIAGNOSIS — R05.9 COUGH: ICD-10-CM

## 2025-01-29 DIAGNOSIS — R09.81 NASAL CONGESTION: ICD-10-CM

## 2025-01-29 DIAGNOSIS — R06.7 SNEEZING: ICD-10-CM

## 2025-01-29 RX ORDER — FLUTICASONE PROPIONATE 50 MCG
SPRAY, SUSPENSION (ML) NASAL
Qty: 16 G | Refills: 2 | Status: SHIPPED | OUTPATIENT
Start: 2025-01-29

## 2025-03-10 ENCOUNTER — HOSPITAL ENCOUNTER (OUTPATIENT)
Dept: GENERAL RADIOLOGY | Age: 65
Discharge: HOME | End: 2025-03-10
Payer: COMMERCIAL

## 2025-03-10 DIAGNOSIS — M81.0 POSTMENOPAUSAL OSTEOPOROSIS: ICD-10-CM

## 2025-03-10 PROCEDURE — 77080 DXA BONE DENSITY AXIAL: CPT

## 2025-04-07 RX ORDER — OMEPRAZOLE 40 MG/1
40 CAPSULE, DELAYED RELEASE ORAL DAILY
Qty: 90 CAPSULE | Refills: 1 | Status: SHIPPED | OUTPATIENT
Start: 2025-04-07

## 2025-04-07 NOTE — TELEPHONE ENCOUNTER
Last OV: 12/18/2024  Next OV: 5/5/2025    Next appointment due:    Last fill:4/17/2024  Refills:1    OK to fill

## 2025-04-22 ENCOUNTER — TELEPHONE (OUTPATIENT)
Dept: INTERNAL MEDICINE CLINIC | Age: 65
End: 2025-04-22

## 2025-04-22 DIAGNOSIS — E11.9 TYPE 2 DIABETES MELLITUS WITHOUT COMPLICATION, WITHOUT LONG-TERM CURRENT USE OF INSULIN (HCC): ICD-10-CM

## 2025-04-22 NOTE — TELEPHONE ENCOUNTER
Pt calling requesting refill of LANTUS SOLOSTAR 100 UNIT/ML injection pen     Last written 11/27/24  Last OV 12/18/24  Next OV 5/5/25    Please send to [facility].

## 2025-04-22 NOTE — TELEPHONE ENCOUNTER
Medication: Mounjaro    Last Filled: 1/13/25    [facility]arm[facility]    Last appt: 12/18/2024   Next appt: 5/5/2025    Last OARRS:        No data to display

## 2025-04-23 RX ORDER — INSULIN GLARGINE 100 [IU]/ML
60 INJECTION, SOLUTION SUBCUTANEOUS NIGHTLY
Qty: 15 ML | Refills: 5 | Status: SHIPPED | OUTPATIENT
Start: 2025-04-23

## 2025-04-23 RX ORDER — INSULIN GLARGINE 100 [IU]/ML
INJECTION, SOLUTION SUBCUTANEOUS
Qty: 15 ML | Refills: 5 | OUTPATIENT
Start: 2025-04-23

## 2025-05-21 ENCOUNTER — TELEPHONE (OUTPATIENT)
Dept: INTERNAL MEDICINE CLINIC | Age: 65
End: 2025-05-21

## 2025-05-21 NOTE — TELEPHONE ENCOUNTER
Patient is calling stating that she got help from juliana and dr sheppard to get her on medicaid insurance and she just found out she was dropped from medicaid , she has united [employer] insurance right now but her insurance company states that she should try to get on medicaid dual so they can cover her treatments and medication  . She would like to know if she can get help trying to get back on medicaid again .  Please advise

## 2025-05-21 NOTE — TELEPHONE ENCOUNTER
Juanita- can you please call her and see if you can help? Thank you.     Christy- she is super overdue for a follow up. Can you please get her scheduled. May use lunch spot. She also has labs that she needs to have drawn- ordered in December.

## 2025-05-22 ENCOUNTER — CARE COORDINATION (OUTPATIENT)
Dept: CARE COORDINATION | Age: 65
End: 2025-05-22

## 2025-05-22 NOTE — CARE COORDINATION
Ambulatory Care Coordination Note     2025 1:01 PM     Patient Current Location:  Home: [address]  [place] [address]     This patient was received as a referral from Provider.    ACM contacted the patient by telephone. Verified name and  with family as identifiers. Provided introduction to self, and explanation of the ACM role.   Family accepted care management services at this time.          ACM: Juanita Metzger RN     Challenges to be reviewed by the provider   Additional needs identified to be addressed with provider No                 Method of communication with provider: none.    Utilization: Initial Call - N/A    Care Summary Note:     ACM outreached patient, her mother answered the phone. ACM verified PHI release form. ACM introduced self and role of care coordinator. Rayna states Mario is not home but told her to be expecting my call and relay information to me.     Rayna Martin received a letter stating her current insurance plan will terminate in 2025. The letter did not state why her insurance will end and Mario has not called her carrier to ask. Mario is requesting assistance applying for Medicaid. ACM advised Rayna to have Mario call her current insurance to determine why her coverage will end next month and if another plan will take its place to determine the next steps. If assistance is needed with reapplying for Medicaid, ACM will refer to CCSW for assistance.     Offered patient enrollment in the Remote Patient Monitoring (RPM) program for in-home monitoring: Yes, but did not enroll at this time: controlled chronic disease management.     Assessments Completed:   No immediate healthcare concerns reported    Medications Reviewed:   Not completed during this call:      Advance Care Planning:   Not reviewed during this call     Care Planning:    Goals Addressed                      This Visit's Progress      Patient Stated (pt-stated)         Insurance terminates in .

## 2025-05-26 DIAGNOSIS — E78.5 HYPERLIPIDEMIA LDL GOAL <70: ICD-10-CM

## 2025-05-26 DIAGNOSIS — E11.9 TYPE 2 DIABETES MELLITUS WITHOUT COMPLICATION, WITHOUT LONG-TERM CURRENT USE OF INSULIN (HCC): ICD-10-CM

## 2025-05-27 RX ORDER — EMPAGLIFLOZIN 25 MG/1
25 TABLET, FILM COATED ORAL DAILY
Qty: 90 TABLET | Refills: 1 | Status: SHIPPED | OUTPATIENT
Start: 2025-05-27

## 2025-05-27 RX ORDER — ROSUVASTATIN CALCIUM 10 MG/1
10 TABLET, COATED ORAL DAILY
Qty: 90 TABLET | Refills: 1 | Status: SHIPPED | OUTPATIENT
Start: 2025-05-27

## 2025-05-28 ENCOUNTER — CARE COORDINATION (OUTPATIENT)
Dept: CARE COORDINATION | Age: 65
End: 2025-05-28

## 2025-05-28 NOTE — CARE COORDINATION
Ambulatory Care Coordination Note     2025 11:56 AM     Patient Current Location:  Home: [address]  [place] [address]     ACM contacted the patient by telephone. Verified name and  with patient as identifiers.         ACM: Juanita Metzger RN     Challenges to be reviewed by the provider   Additional needs identified to be addressed with provider No                 Method of communication with provider: none.    Utilization: Patient has not had any utilization since our last call.     Care Summary Note: ACM outreached patient for follow up regarding insurance coverage. Patient states she is losing her Medicaid benefits beginning  and will have [facility] Medicare only. [facility] to determine why she is losing coverage but has been unable to speak with a live representative. Patient advised ACM will refer to CCSW for assistance. No other immediate needs voiced at this time.     Offered patient enrollment in the Remote Patient Monitoring (RPM) program for in-home monitoring: Yes, but did not enroll at this time: controlled chronic disease management.     Assessments Completed:   General Assessment    Do you have any symptoms that are causing concern?: No          Medications Reviewed:   Patient denies any changes with medications and reports taking all medications as prescribed.    Advance Care Planning:   Not reviewed during this call     Care Planning:    Goals Addressed                      This Visit's Progress      Patient Stated (pt-stated)         Insurance terminates in . Patient requesting assistance reapplying for Medicaid.    Barriers: lack of education  Plan for overcoming my barriers: Patient and her mother have been advised to contact her insurance carrier to determine why her insurance is being terminated and if another plan will take its place. ACM will refer to CCSW if assistance is needed with applying for Medicaid  Confidence: 7/10  Anticipated Goal

## 2025-06-06 ENCOUNTER — CARE COORDINATION (OUTPATIENT)
Dept: CARE COORDINATION | Age: 65
End: 2025-06-06

## 2025-06-06 NOTE — CARE COORDINATION
Ambulatory Care Coordination Note     2025 9:32 AM     Patient Current Location:  Home: [address]  [place] [id number]     ACM contacted the patient by telephone. Verified name and  with patient as identifiers.         ACM: Juanita Metzger RN     Challenges to be reviewed by the provider   Additional needs identified to be addressed with provider No                 Method of communication with provider: none.    Utilization: Patient has not had any utilization since our last call.     Care Summary Note: ACM outreached patient for cc follow up. Patient is on her way to a  and will return call to [facility] at later date.     Offered patient enrollment in the Remote Patient Monitoring (RPM) program for in-home monitoring: Yes, but did not enroll at this time: controlled chronic disease management.     Assessments Completed:   No changes since last call    Medications Reviewed:   Patient denies any changes with medications and reports taking all medications as prescribed.    Advance Care Planning:   Not reviewed during this call     Care Planning:   Not completed during this call    PCP/Specialist follow up:   Future Appointments         Provider Specialty Dept Phone    2025 8:15 AM (Arrive by 8:00 AM) [facility] RM 1 Radiology [phone]    2025 9:30 AM Abimbola Jha MD General Surgery [phone]    8/15/2025 3:20 PM Ana Sinclair DO Internal Medicine [phone]            Follow Up:   Plan for next [facility] outreach in approximately 1 week to complete:  - insurance  Patient  is agreeable to this plan.

## 2025-06-09 ENCOUNTER — CARE COORDINATION (OUTPATIENT)
Dept: CARE COORDINATION | Age: 65
End: 2025-06-09

## 2025-06-09 NOTE — CARE COORDINATION
Ambulatory Care Coordination Note     6/9/2025 2:17 PM     Patient outreach attempt by this ACM today to perform care management follow up . ACM was unable to reach the patient by telephone today;   left voice message requesting a return phone call to this ACM.     ACM: Juanita Metzger RN     Care Summary Note:     PCP/Specialist follow up:   Future Appointments         Provider Specialty Dept Phone    8/7/2025 8:15 AM (Arrive by 8:00 AM) [facility] RM 1 Radiology [phone]    8/7/2025 9:30 AM Abimbola Jha MD General Surgery [phone]    8/15/2025 3:20 PM Ana Sinclair DO Internal Medicine [phone]            Follow Up:   Plan for next ACM outreach in approximately 1 week to complete:  insurance

## 2025-06-10 ENCOUNTER — CARE COORDINATION (OUTPATIENT)
Dept: CARE COORDINATION | Age: 65
End: 2025-06-10

## 2025-06-10 NOTE — CARE COORDINATION
Call to patient to initiate  referral for Medicaid questions. Pt plan changed from dual to Medicare only. She stated that it may have to do with her turning 24. She has not been able to reach anyone at Medicaid however stated she would try again. She recently experienced a death in the family and has not been able to follow up. Informed her that she can call [facility] instead due to both plans being [facility], and they should be able to tell her why her plan has changed. She confirmed that she can call the member number on the back of her card. She documented SW number and stated she would call  after she speaks with [facility].    SW will f/u.

## 2025-06-11 ENCOUNTER — CARE COORDINATION (OUTPATIENT)
Dept: CARE COORDINATION | Age: 65
End: 2025-06-11

## 2025-06-11 DIAGNOSIS — R05.9 COUGH: ICD-10-CM

## 2025-06-11 DIAGNOSIS — J30.89 SEASONAL ALLERGIC RHINITIS DUE TO OTHER ALLERGIC TRIGGER: ICD-10-CM

## 2025-06-11 DIAGNOSIS — R09.81 NASAL CONGESTION: ICD-10-CM

## 2025-06-11 DIAGNOSIS — R06.7 SNEEZING: ICD-10-CM

## 2025-06-11 RX ORDER — FLUTICASONE PROPIONATE 50 MCG
2 SPRAY, SUSPENSION (ML) NASAL DAILY
Qty: 16 G | Refills: 2 | Status: SHIPPED | OUTPATIENT
Start: 2025-06-11

## 2025-06-11 NOTE — CARE COORDINATION
Ambulatory Care Coordination Note     2025 4:26 PM     Patient Current Location:  Home: [address]  [place] [id number]     ACM contacted the patient by telephone. Verified name and  with patient as identifiers.         ACM: Juanita Metzger RN     Challenges to be reviewed by the provider   Additional needs identified to be addressed with provider No                 Method of communication with provider: none.    Utilization: Patient has not had any utilization since our last call.     Care Summary Note: [facility] outreached patient regarding medicaid. Patient connected with [facility] and was advised to call to [facility] to inquire about coverage. Patient has not outreached [facility] to date but intends to call soon. AC discussed applying for Medicare Xtra Help but patient states she already receives this service. No other immediate needs voiced at this time. Patient agreeable to f/u from [facility] in 2-3 weeks.    Offered patient enrollment in the Remote Patient Monitoring (RPM) program for in-home monitoring: Yes, but did not enroll at this time: controlled chronic disease management.     Assessments Completed:   No changes since last call    Medications Reviewed:   Patient denies any changes with medications and reports taking all medications as prescribed.    Advance Care Planning:   Not reviewed during this call     Care Planning:   Not completed during this call    PCP/Specialist follow up:   Future Appointments         Provider Specialty Dept Phone    2025 8:15 AM (Arrive by 8:00 AM) [facility] RM 1 Radiology [phone]    2025 9:30 AM Abimbola Jha MD General Surgery [phone]    8/15/2025 3:20 PM Ana Sinclair DO Internal Medicine [phone]            Follow Up:   Plan for next [facility] outreach in approximately  1-2 weeks  to complete:  - medicare/medicaid .   Patient  is agreeable to this plan.

## 2025-06-11 NOTE — TELEPHONE ENCOUNTER
Medication:   Requested Prescriptions     Pending Prescriptions Disp Refills    fluticasone (FLONASE) 50 MCG/ACT nasal spray [Pharmacy Med Name: FLUTICASONE 50MCG NASAL SP (120) RX] 16 g 2     Sig: SHAKE LIQUID AND USE 2 SPRAYS IN EACH NOSTRIL DAILY        Last Filled:      Patient Phone Number: [phone] (home)     Last appt: 12/18/2024   Next appt: 8/15/2025    Last OARRS:        No data to display

## 2025-06-19 DIAGNOSIS — J30.2 SEASONAL ALLERGIES: ICD-10-CM

## 2025-06-19 DIAGNOSIS — I10 ESSENTIAL HYPERTENSION: ICD-10-CM

## 2025-06-19 RX ORDER — MONTELUKAST SODIUM 10 MG/1
10 TABLET ORAL NIGHTLY
Qty: 90 TABLET | Refills: 1 | Status: SHIPPED | OUTPATIENT
Start: 2025-06-19

## 2025-06-19 RX ORDER — VALSARTAN 320 MG/1
320 TABLET ORAL DAILY
Qty: 90 TABLET | Refills: 1 | Status: SHIPPED | OUTPATIENT
Start: 2025-06-19

## 2025-06-23 ENCOUNTER — CARE COORDINATION (OUTPATIENT)
Dept: CARE COORDINATION | Age: 65
End: 2025-06-23

## 2025-06-23 NOTE — CARE COORDINATION
Ambulatory Care Coordination Note     2025 9:37 AM     Patient Current Location:  Home: [address]  [place] [id number]     ACM contacted the patient by telephone. Verified name and  with patient as identifiers.         ACM: Juanita Metzger RN     Challenges to be reviewed by the provider   Additional needs identified to be addressed with provider No                 Method of communication with provider: none.    Utilization: Patient has not had any utilization since our last call.     Care Summary Note: ACM outreached patient who states she has not contacted [facility] to date. Patient states she has been busy but intends to follow up soon. No immediate needs voiced. Patient agreeable to follow up from [facility] in 1 month.     Offered patient enrollment in the Remote Patient Monitoring (RPM) program for in-home monitoring: Yes, but did not enroll at this time: controlled chronic disease management.     Assessments Completed:   No changes since last call    Medications Reviewed:   Patient denies any changes with medications and reports taking all medications as prescribed.    Advance Care Planning:   Not reviewed during this call     Care Planning:    Goals Addressed                      This Visit's Progress      Patient Stated (pt-stated)         Insurance terminates in . Patient requesting assistance reapplying for Medicaid.    Barriers: lack of education  Plan for overcoming my barriers: Patient and her mother have been advised to contact her insurance carrier to determine why her insurance is being terminated and if another plan will take its place. ACM will refer to CCSW if assistance is needed with applying for Medicaid  Confidence: 7/10  Anticipated Goal Completion Date: 2025 ACM spoke with patients mother, Rayna. Rayna states Mario is not home at the time of my call but was anticipating my call and told her mother to relay the information to me. Patient received a letter stating her current

## 2025-06-26 ENCOUNTER — OFFICE VISIT (OUTPATIENT)
Dept: INTERNAL MEDICINE CLINIC | Age: 65
End: 2025-06-26
Payer: MEDICARE

## 2025-06-26 VITALS
HEART RATE: 84 BPM | WEIGHT: 136.2 LBS | TEMPERATURE: 98.5 F | OXYGEN SATURATION: 96 % | HEIGHT: 62 IN | BODY MASS INDEX: 25.06 KG/M2 | DIASTOLIC BLOOD PRESSURE: 72 MMHG | SYSTOLIC BLOOD PRESSURE: 148 MMHG

## 2025-06-26 DIAGNOSIS — E11.9 TYPE 2 DIABETES MELLITUS WITHOUT COMPLICATION, WITHOUT LONG-TERM CURRENT USE OF INSULIN (HCC): ICD-10-CM

## 2025-06-26 DIAGNOSIS — J30.2 SEASONAL ALLERGIES: ICD-10-CM

## 2025-06-26 DIAGNOSIS — I10 ESSENTIAL HYPERTENSION: ICD-10-CM

## 2025-06-26 DIAGNOSIS — J06.9 URI, ACUTE: Primary | ICD-10-CM

## 2025-06-26 LAB
Lab: NORMAL
QC PASS/FAIL: NORMAL
SARS-COV-2 RDRP RESP QL NAA+PROBE: NEGATIVE

## 2025-06-26 PROCEDURE — 1090F PRES/ABSN URINE INCON ASSESS: CPT | Performed by: NURSE PRACTITIONER

## 2025-06-26 PROCEDURE — 3046F HEMOGLOBIN A1C LEVEL >9.0%: CPT | Performed by: NURSE PRACTITIONER

## 2025-06-26 PROCEDURE — G8420 CALC BMI NORM PARAMETERS: HCPCS | Performed by: NURSE PRACTITIONER

## 2025-06-26 PROCEDURE — 2022F DILAT RTA XM EVC RTNOPTHY: CPT | Performed by: NURSE PRACTITIONER

## 2025-06-26 PROCEDURE — 87635 SARS-COV-2 COVID-19 AMP PRB: CPT | Performed by: NURSE PRACTITIONER

## 2025-06-26 PROCEDURE — 1123F ACP DISCUSS/DSCN MKR DOCD: CPT | Performed by: NURSE PRACTITIONER

## 2025-06-26 PROCEDURE — G8427 DOCREV CUR MEDS BY ELIG CLIN: HCPCS | Performed by: NURSE PRACTITIONER

## 2025-06-26 PROCEDURE — 99214 OFFICE O/P EST MOD 30 MIN: CPT | Performed by: NURSE PRACTITIONER

## 2025-06-26 PROCEDURE — 3017F COLORECTAL CA SCREEN DOC REV: CPT | Performed by: NURSE PRACTITIONER

## 2025-06-26 PROCEDURE — 1036F TOBACCO NON-USER: CPT | Performed by: NURSE PRACTITIONER

## 2025-06-26 PROCEDURE — 3077F SYST BP >= 140 MM HG: CPT | Performed by: NURSE PRACTITIONER

## 2025-06-26 PROCEDURE — 3078F DIAST BP <80 MM HG: CPT | Performed by: NURSE PRACTITIONER

## 2025-06-26 PROCEDURE — G8399 PT W/DXA RESULTS DOCUMENT: HCPCS | Performed by: NURSE PRACTITIONER

## 2025-06-26 RX ORDER — CLINDAMYCIN HYDROCHLORIDE 300 MG/1
300 CAPSULE ORAL 3 TIMES DAILY
Qty: 21 CAPSULE | Refills: 0 | Status: SHIPPED | OUTPATIENT
Start: 2025-06-26 | End: 2025-07-03

## 2025-06-26 SDOH — ECONOMIC STABILITY: FOOD INSECURITY: WITHIN THE PAST 12 MONTHS, THE FOOD YOU BOUGHT JUST DIDN'T LAST AND YOU DIDN'T HAVE MONEY TO GET MORE.: NEVER TRUE

## 2025-06-26 SDOH — ECONOMIC STABILITY: FOOD INSECURITY: WITHIN THE PAST 12 MONTHS, YOU WORRIED THAT YOUR FOOD WOULD RUN OUT BEFORE YOU GOT MONEY TO BUY MORE.: NEVER TRUE

## 2025-06-26 ASSESSMENT — ENCOUNTER SYMPTOMS
SINUS PAIN: 1
RHINORRHEA: 1
COUGH: 1
WHEEZING: 0
SHORTNESS OF BREATH: 0

## 2025-06-26 ASSESSMENT — PATIENT HEALTH QUESTIONNAIRE - PHQ9
SUM OF ALL RESPONSES TO PHQ QUESTIONS 1-9: 0
2. FEELING DOWN, DEPRESSED OR HOPELESS: NOT AT ALL
SUM OF ALL RESPONSES TO PHQ QUESTIONS 1-9: 0
SUM OF ALL RESPONSES TO PHQ QUESTIONS 1-9: 0
1. LITTLE INTEREST OR PLEASURE IN DOING THINGS: NOT AT ALL
SUM OF ALL RESPONSES TO PHQ QUESTIONS 1-9: 0

## 2025-06-26 NOTE — PROGRESS NOTES
Office Visit   6/26/2025    Assessment and Plan:  Diagnoses and all orders for this visit:  URI, acute  -     POCT COVID-19 Rapid, NAAT  -     clindamycin (CLEOCIN) 300 MG capsule; Take 1 capsule by mouth 3 times daily for 7 days  Type 2 diabetes mellitus without complication, without long-term current use of insulin (HCC)  Seasonal allergies  Essential hypertension    Assessment & Plan  Sinusitis/URI  - Acute, new problem  - COVID-19 test negative  - Discussion: avoid heat exposure, stay hydrated, monitor blood pressure  - Patient is interested in using Clindamycin which worked for her in the past. Patient would like to avoid prednisone at this time.  - Prescription for Clindamycin 300 mg prescribed, to be taken three times daily for 7 days sent to pharmacy.  - Saline spray recommended for nasal bleeding    Allergic rhinitis  - Reports year-round allergies that worsen in the summer  - Currently taking loratadine, montelukast, and Flonase  - Advised to continue current allergy medications  - No changes to medication regimen    Diabetes mellitus  - Advised to monitor blood sugar levels closely during illness  - Encourage adequate hydration  - Continue current medication regimen.    HTN  - Blood pressure mildly elevated today most likely secondary to URI  - Monitor BP at home.  - Continue current medication regimen    No follow-ups on file.       Subjective:  Chief Complaint   Patient presents with    Fever     Fever of 99.3 (6/24) and congestion x 1 week   Took 2 tylenol around 1pm        HPI:   Mario Chopra is a 64 y.o. female who presents to the clinic today for acute visit.    History of Present Illness  The patient presents for evaluation of sinus or upper respiratory infection.    She reports symptoms of congestion, a low-grade fever fluctuating between 99.3 and 100 degrees, and sharp nasal pain. These symptoms began on 06/19/2025 and have progressively worsened, with today being the most severe. She also

## 2025-06-30 ENCOUNTER — CARE COORDINATION (OUTPATIENT)
Dept: CARE COORDINATION | Age: 65
End: 2025-06-30

## 2025-06-30 NOTE — CARE COORDINATION
F/u call with pt. She reported that she has not called [facility] yet however will call them soon. She reported no other questions or concerns to be addressed by SW.

## 2025-07-24 NOTE — TELEPHONE ENCOUNTER
Patient calling to check status of refill. Informed patient message has been sent back, just hasn't been addressed yet. Please give patient a call once script is sent.

## 2025-07-24 NOTE — TELEPHONE ENCOUNTER
Received refill request for clindamycin (CLEOCIN) 300 MG capsule  from [facility] pharmacy.     Last OV: 06/26/25    Next OV: 08/15/25    Last Labs: 12/18/24    Last Filled: 06/26/25    Has upper respiratory infection

## 2025-07-25 ENCOUNTER — TELEPHONE (OUTPATIENT)
Dept: INTERNAL MEDICINE CLINIC | Age: 65
End: 2025-07-25

## 2025-07-25 RX ORDER — CLINDAMYCIN HYDROCHLORIDE 300 MG/1
300 CAPSULE ORAL 3 TIMES DAILY
Qty: 30 CAPSULE | Refills: 0 | OUTPATIENT
Start: 2025-07-25 | End: 2025-08-04

## 2025-07-25 RX ORDER — CLINDAMYCIN HYDROCHLORIDE 150 MG/1
150 CAPSULE ORAL 3 TIMES DAILY
Qty: 30 CAPSULE | Refills: 0 | Status: SHIPPED | OUTPATIENT
Start: 2025-07-25 | End: 2025-08-04

## 2025-07-25 NOTE — TELEPHONE ENCOUNTER
Called patient to clarify info given. Patient stated that she called yesterday, but symptoms started 3 days ago, which were congestion, low grade temp, loss of voice, slight cough and sinus pain. Patient stated that she has tried Mucinex, tylenol, moisturizing nasal sprays, flonase and Vicks salve.

## 2025-07-25 NOTE — TELEPHONE ENCOUNTER
I have sent Clindamycin to her pharmacy.     Add OTC Claritin, Allegra, or Zyrtec as well.     If not feeling better next week, she will need to be seen.

## 2025-07-28 ENCOUNTER — TELEPHONE (OUTPATIENT)
Dept: INTERNAL MEDICINE CLINIC | Age: 65
End: 2025-07-28

## 2025-07-28 NOTE — TELEPHONE ENCOUNTER
Called patient to get an update on how she was feeling. Did  med from the pharmacy. Patient states that she is feeling a lot better. She stated that she will make an appointment if things change.

## 2025-08-07 ENCOUNTER — HOSPITAL ENCOUNTER (OUTPATIENT)
Dept: WOMENS IMAGING | Age: 65
Discharge: HOME | End: 2025-08-07
Payer: MEDICARE

## 2025-08-07 ENCOUNTER — OFFICE VISIT (OUTPATIENT)
Dept: SURGERY | Age: 65
End: 2025-08-07
Payer: MEDICARE

## 2025-08-07 VITALS
OXYGEN SATURATION: 97 % | BODY MASS INDEX: 23.81 KG/M2 | WEIGHT: 134.4 LBS | HEIGHT: 63 IN | HEART RATE: 77 BPM | RESPIRATION RATE: 17 BRPM

## 2025-08-07 VITALS — BODY MASS INDEX: 23.92 KG/M2 | HEIGHT: 63 IN | WEIGHT: 135 LBS

## 2025-08-07 DIAGNOSIS — Z85.3 ENCOUNTER FOR FOLLOW-UP SURVEILLANCE OF BREAST CANCER: Primary | ICD-10-CM

## 2025-08-07 DIAGNOSIS — Z09 SURGICAL FOLLOW-UP CARE: ICD-10-CM

## 2025-08-07 DIAGNOSIS — Z12.31 SCREENING MAMMOGRAM, ENCOUNTER FOR: ICD-10-CM

## 2025-08-07 DIAGNOSIS — Z85.3 ENCOUNTER FOR FOLLOW-UP SURVEILLANCE OF BREAST CANCER: ICD-10-CM

## 2025-08-07 DIAGNOSIS — Z08 ENCOUNTER FOR FOLLOW-UP SURVEILLANCE OF BREAST CANCER: Primary | ICD-10-CM

## 2025-08-07 DIAGNOSIS — Z08 ENCOUNTER FOR FOLLOW-UP SURVEILLANCE OF BREAST CANCER: ICD-10-CM

## 2025-08-07 DIAGNOSIS — Z12.39 SCREENING BREAST EXAMINATION: ICD-10-CM

## 2025-08-07 DIAGNOSIS — Z85.3 PERSONAL HISTORY OF BREAST CANCER: ICD-10-CM

## 2025-08-07 DIAGNOSIS — Z85.3 PERSONAL HISTORY OF BREAST CANCER: Primary | ICD-10-CM

## 2025-08-07 PROCEDURE — G8420 CALC BMI NORM PARAMETERS: HCPCS | Performed by: SURGERY

## 2025-08-07 PROCEDURE — 1123F ACP DISCUSS/DSCN MKR DOCD: CPT | Performed by: SURGERY

## 2025-08-07 PROCEDURE — 1036F TOBACCO NON-USER: CPT | Performed by: SURGERY

## 2025-08-07 PROCEDURE — 77063 BREAST TOMOSYNTHESIS BI: CPT

## 2025-08-07 PROCEDURE — G8399 PT W/DXA RESULTS DOCUMENT: HCPCS | Performed by: SURGERY

## 2025-08-07 PROCEDURE — 1090F PRES/ABSN URINE INCON ASSESS: CPT | Performed by: SURGERY

## 2025-08-07 PROCEDURE — G8427 DOCREV CUR MEDS BY ELIG CLIN: HCPCS | Performed by: SURGERY

## 2025-08-07 PROCEDURE — 3017F COLORECTAL CA SCREEN DOC REV: CPT | Performed by: SURGERY

## 2025-08-07 PROCEDURE — 99213 OFFICE O/P EST LOW 20 MIN: CPT | Performed by: SURGERY

## 2025-08-15 ENCOUNTER — OFFICE VISIT (OUTPATIENT)
Dept: INTERNAL MEDICINE CLINIC | Age: 65
End: 2025-08-15

## 2025-08-15 ENCOUNTER — CARE COORDINATION (OUTPATIENT)
Dept: CARE COORDINATION | Age: 65
End: 2025-08-15

## 2025-08-15 VITALS
HEIGHT: 63 IN | OXYGEN SATURATION: 95 % | WEIGHT: 134.5 LBS | BODY MASS INDEX: 23.83 KG/M2 | DIASTOLIC BLOOD PRESSURE: 80 MMHG | HEART RATE: 73 BPM | SYSTOLIC BLOOD PRESSURE: 134 MMHG

## 2025-08-15 VITALS — HEART RATE: 73 BPM

## 2025-08-15 DIAGNOSIS — M81.0 AGE-RELATED OSTEOPOROSIS WITHOUT CURRENT PATHOLOGICAL FRACTURE: ICD-10-CM

## 2025-08-15 DIAGNOSIS — Z88.0 PENICILLIN ALLERGY: ICD-10-CM

## 2025-08-15 DIAGNOSIS — Z00.00 MEDICARE ANNUAL WELLNESS VISIT, SUBSEQUENT: Primary | ICD-10-CM

## 2025-08-15 DIAGNOSIS — R09.81 SINUS CONGESTION: ICD-10-CM

## 2025-08-15 DIAGNOSIS — J30.89 SEASONAL ALLERGIC RHINITIS DUE TO OTHER ALLERGIC TRIGGER: ICD-10-CM

## 2025-08-15 DIAGNOSIS — D12.6 TUBULAR ADENOMA OF COLON: ICD-10-CM

## 2025-08-15 DIAGNOSIS — I10 ESSENTIAL HYPERTENSION: ICD-10-CM

## 2025-08-15 DIAGNOSIS — Z17.0 MALIGNANT NEOPLASM OF LEFT BREAST IN FEMALE, ESTROGEN RECEPTOR POSITIVE, UNSPECIFIED SITE OF BREAST (HCC): ICD-10-CM

## 2025-08-15 DIAGNOSIS — E11.9 TYPE 2 DIABETES MELLITUS WITHOUT COMPLICATION, WITHOUT LONG-TERM CURRENT USE OF INSULIN (HCC): ICD-10-CM

## 2025-08-15 DIAGNOSIS — E11.9 TYPE 2 DIABETES MELLITUS WITHOUT COMPLICATION, WITHOUT LONG-TERM CURRENT USE OF INSULIN (HCC): Primary | ICD-10-CM

## 2025-08-15 DIAGNOSIS — Z12.11 COLON CANCER SCREENING: ICD-10-CM

## 2025-08-15 DIAGNOSIS — J45.20 MILD INTERMITTENT ASTHMA WITHOUT COMPLICATION: ICD-10-CM

## 2025-08-15 DIAGNOSIS — G43.009 MIGRAINE WITHOUT AURA AND WITHOUT STATUS MIGRAINOSUS, NOT INTRACTABLE: ICD-10-CM

## 2025-08-15 DIAGNOSIS — C50.912 MALIGNANT NEOPLASM OF LEFT BREAST IN FEMALE, ESTROGEN RECEPTOR POSITIVE, UNSPECIFIED SITE OF BREAST (HCC): ICD-10-CM

## 2025-08-15 DIAGNOSIS — E78.5 HYPERLIPIDEMIA LDL GOAL <70: ICD-10-CM

## 2025-08-15 LAB
ALBUMIN SERPL-MCNC: 4.2 G/DL (ref 3.4–5)
ALBUMIN/GLOB SERPL: 1.5 {RATIO} (ref 1.1–2.2)
ALP SERPL-CCNC: 97 U/L (ref 40–129)
ALT SERPL-CCNC: 19 U/L (ref 10–40)
ANION GAP SERPL CALCULATED.3IONS-SCNC: 11 MMOL/L (ref 3–16)
AST SERPL-CCNC: 28 U/L (ref 15–37)
BASOPHILS # BLD: 0 K/UL (ref 0–0.2)
BASOPHILS NFR BLD: 0.6 %
BILIRUB SERPL-MCNC: 0.8 MG/DL (ref 0–1)
BUN SERPL-MCNC: 5 MG/DL (ref 7–20)
CALCIUM SERPL-MCNC: 9.1 MG/DL (ref 8.3–10.6)
CHLORIDE SERPL-SCNC: 106 MMOL/L (ref 99–110)
CHOLEST SERPL-MCNC: 109 MG/DL (ref 0–199)
CO2 SERPL-SCNC: 26 MMOL/L (ref 21–32)
CREAT SERPL-MCNC: 0.8 MG/DL (ref 0.6–1.2)
DEPRECATED RDW RBC AUTO: 14 % (ref 12.4–15.4)
EOSINOPHIL # BLD: 0.2 K/UL (ref 0–0.6)
EOSINOPHIL NFR BLD: 3.6 %
GFR SERPLBLD CREATININE-BSD FMLA CKD-EPI: 81 ML/MIN/{1.73_M2}
GLUCOSE SERPL-MCNC: 108 MG/DL (ref 70–99)
HCT VFR BLD AUTO: 39 % (ref 36–48)
HDLC SERPL-MCNC: 35 MG/DL (ref 40–60)
HGB BLD-MCNC: 13 G/DL (ref 12–16)
LDLC SERPL CALC-MCNC: 52 MG/DL
LYMPHOCYTES # BLD: 1.2 K/UL (ref 1–5.1)
LYMPHOCYTES NFR BLD: 18.5 %
MCH RBC QN AUTO: 29.9 PG (ref 26–34)
MCHC RBC AUTO-ENTMCNC: 33.2 G/DL (ref 31–36)
MCV RBC AUTO: 90.1 FL (ref 80–100)
MONOCYTES # BLD: 0.5 K/UL (ref 0–1.3)
MONOCYTES NFR BLD: 7.2 %
NEUTROPHILS # BLD: 4.4 K/UL (ref 1.7–7.7)
NEUTROPHILS NFR BLD: 70.1 %
PLATELET # BLD AUTO: 213 K/UL (ref 135–450)
PMV BLD AUTO: 9.2 FL (ref 5–10.5)
POTASSIUM SERPL-SCNC: 3.6 MMOL/L (ref 3.5–5.1)
PROT SERPL-MCNC: 7 G/DL (ref 6.4–8.2)
RBC # BLD AUTO: 4.33 M/UL (ref 4–5.2)
SODIUM SERPL-SCNC: 143 MMOL/L (ref 136–145)
TRIGL SERPL-MCNC: 111 MG/DL (ref 0–150)
VLDLC SERPL CALC-MCNC: 22 MG/DL
WBC # BLD AUTO: 6.3 K/UL (ref 4–11)

## 2025-08-15 RX ORDER — VALSARTAN 320 MG/1
320 TABLET ORAL DAILY
Qty: 90 TABLET | Refills: 1 | Status: SHIPPED | OUTPATIENT
Start: 2025-08-15

## 2025-08-15 RX ORDER — FLUTICASONE PROPIONATE 50 MCG
2 SPRAY, SUSPENSION (ML) NASAL DAILY
Qty: 16 G | Refills: 2 | Status: SHIPPED | OUTPATIENT
Start: 2025-08-15

## 2025-08-15 RX ORDER — ALBUTEROL SULFATE 90 UG/1
2 INHALANT RESPIRATORY (INHALATION) EVERY 6 HOURS PRN
Qty: 18 G | Refills: 1 | Status: SHIPPED | OUTPATIENT
Start: 2025-08-15

## 2025-08-15 RX ORDER — ROSUVASTATIN CALCIUM 10 MG/1
10 TABLET, COATED ORAL DAILY
Qty: 90 TABLET | Refills: 1 | Status: SHIPPED | OUTPATIENT
Start: 2025-08-15

## 2025-08-15 ASSESSMENT — PATIENT HEALTH QUESTIONNAIRE - PHQ9
SUM OF ALL RESPONSES TO PHQ QUESTIONS 1-9: 0
SUM OF ALL RESPONSES TO PHQ QUESTIONS 1-9: 0
1. LITTLE INTEREST OR PLEASURE IN DOING THINGS: NOT AT ALL
SUM OF ALL RESPONSES TO PHQ QUESTIONS 1-9: 0
2. FEELING DOWN, DEPRESSED OR HOPELESS: NOT AT ALL
SUM OF ALL RESPONSES TO PHQ QUESTIONS 1-9: 0

## 2025-08-15 ASSESSMENT — LIFESTYLE VARIABLES
HOW MANY STANDARD DRINKS CONTAINING ALCOHOL DO YOU HAVE ON A TYPICAL DAY: PATIENT DOES NOT DRINK
HOW OFTEN DO YOU HAVE A DRINK CONTAINING ALCOHOL: MONTHLY OR LESS

## 2025-08-16 LAB
EST. AVERAGE GLUCOSE BLD GHB EST-MCNC: 122.6 MG/DL
HBA1C MFR BLD: 5.9 %

## 2025-08-29 ENCOUNTER — CARE COORDINATION (OUTPATIENT)
Dept: CARE COORDINATION | Age: 65
End: 2025-08-29

## (undated) DEVICE — CORD ES L10FT MPLR LAP

## (undated) DEVICE — FORCEPS BX L240CM WRK CHN 2.8MM STD CAP W/ NDL MIC MESH

## (undated) DEVICE — PROCEDURE KIT ENDOSCP CUST

## (undated) DEVICE — MEDICINE CUP, GRADUATED, STER: Brand: MEDLINE

## (undated) DEVICE — 35 ML SYRINGE LUER-LOCK TIP: Brand: MONOJECT

## (undated) DEVICE — DRAPE,LAP,CHOLE,W/TROUGHS,STERILE: Brand: MEDLINE

## (undated) DEVICE — INDICATED FOR USE DURING OPEN AND LAPAROSCOPIC CHOLECYSTECTOMY PROCEDURES TO INJECT RADIOPAQUE MEDIA THROUGH THE CYSTIC DUCT INTO THE BILIARY TREE.: Brand: AEROSTAT®

## (undated) DEVICE — GLOVE SURG SZ 65 L12IN THK75MIL DK GRN LTX FREE

## (undated) DEVICE — DRAPE HND W114XL142IN BLU POLYPR W O PCH FEN CRD AND TB HLDR

## (undated) DEVICE — GLOVE SURG SZ 65 THK91MIL LTX FREE SYN POLYISOPRENE

## (undated) DEVICE — LAPAROSCOPY PK

## (undated) DEVICE — MASC TURNOVER KIT: Brand: MEDLINE INDUSTRIES, INC.

## (undated) DEVICE — SYRINGE MED 10ML TRNSLUC BRL PLUNG BLK MRK POLYPR CTRL

## (undated) DEVICE — BANDAGE ELASTIC 5YDX4IN ST COMPRSS LF NON ADH

## (undated) DEVICE — DRILL, AO, SCALED: Brand: VARIAX

## (undated) DEVICE — TROCAR ENDOSCP L100MM DIA5MM BLDELSS STBL SL OBT RADLUC

## (undated) DEVICE — GLOVE,SURG,SENSICARE SLT,LF,PF,8: Brand: MEDLINE

## (undated) DEVICE — SUTURE VCRL + SZ 3-0 L18IN ABSRB UD SH 1/2 CIR TAPERCUT NDL VCP864D

## (undated) DEVICE — PADDING CAST W4INXL4YD ST COT RAYON MICROPLEATED HIGHLY

## (undated) DEVICE — ELECTRODE PT RET AD L9FT HI MOIST COND ADH HYDRGEL CORDED

## (undated) DEVICE — GLOVE SURG SZ 6.5 L11.2IN FNGR THK9.8MIL STRW LTX POLYMER

## (undated) DEVICE — NEEDLE HYPO 22GA L1 1/2IN PIVOTING SHLD FOR LUERLOCK SYR

## (undated) DEVICE — C-ARM: Brand: UNBRANDED

## (undated) DEVICE — Device

## (undated) DEVICE — HYPODERMIC SAFETY NEEDLE: Brand: MAGELLAN

## (undated) DEVICE — INTENDED FOR TISSUE SEPARATION, AND OTHER PROCEDURES THAT REQUIRE A SHARP SURGICAL BLADE TO PUNCTURE OR CUT.: Brand: BARD-PARKER ® STAINLESS STEEL BLADES

## (undated) DEVICE — TRAP SPEC RETRV CLR PLAS POLYP IN LN SUCT QUIK CTCH

## (undated) DEVICE — DRAPE C ARM UNIV W41XL74IN CLR PLAS XR VELC CLSR POLY STRP

## (undated) DEVICE — SOLUTION IV IRRIG POUR BRL 0.9% SODIUM CHL 2F7124

## (undated) DEVICE — KIRSCHNER WIRE
Type: IMPLANTABLE DEVICE | Site: WRIST | Status: NON-FUNCTIONAL
Brand: VARIAX
Removed: 2023-02-16

## (undated) DEVICE — APPLIER CLP M/L SHFT DIA5MM 15 LIG LIGAMAX 5

## (undated) DEVICE — WET SKIN PREP TRAY: Brand: MEDLINE INDUSTRIES, INC.

## (undated) DEVICE — TOWEL,OR,DSP,ST,BLUE,STD,4/PK,20PK/CS: Brand: MEDLINE

## (undated) DEVICE — BANDAGE COMPR W4INXL12FT E DISP ESMARCH EVEN

## (undated) DEVICE — PACK PROCEDURE SURG EXTREMITY MFFOP CUST

## (undated) DEVICE — GAUZE,SPONGE,4"X4",8PLY,STRL,LF,10/TRAY: Brand: MEDLINE

## (undated) DEVICE — COVER LT HNDL BLU PLAS

## (undated) DEVICE — SUTURE MCRYL + SZ 4-0 L27IN ABSRB UD L19MM PS-2 3/8 CIR MCP426H

## (undated) DEVICE — STRIP,CLOSURE,WOUND,MEDI-STRIP,1/2X4: Brand: MEDLINE

## (undated) DEVICE — UNTHREADED GUIDE WIRE: Brand: FIXOS

## (undated) DEVICE — MASTISOL ADHESIVE LIQ 2/3ML

## (undated) DEVICE — SUTURE MCRYL SZ 4-0 L27IN ABSRB UD L19MM PS-2 1/2 CIR PRIM Y426H

## (undated) DEVICE — SKIN AFFIX SURG ADHESIVE 72/CS 0.55ML: Brand: MEDLINE

## (undated) DEVICE — SOLUTION IV IRRIG WATER 500ML POUR BRL ST 2F7113

## (undated) DEVICE — 60 ML SYRINGE,REGULAR TIP: Brand: MONOJECT

## (undated) DEVICE — Device: Brand: DISPOSABLE ELECTROSURGICAL SNARE

## (undated) DEVICE — BW-412T DISP COMBO CLEANING BRUSH: Brand: SINGLE USE COMBINATION CLEANING BRUSH

## (undated) DEVICE — ZIMMER® STERILE DISPOSABLE TOURNIQUET CUFF WITH PLC, DUAL PORT, SINGLE BLADDER, 18 IN. (46 CM)

## (undated) DEVICE — KIT OR ROOM TURNOVER W/STRAP

## (undated) DEVICE — SOLUTION IRRIG 500ML 0.9% SOD CHLO USP POUR PLAS BTL

## (undated) DEVICE — COTTON UNDERCAST PADDING,CRIMPED FINISH: Brand: WEBRIL

## (undated) DEVICE — SYRINGE IRRIG 60ML SFT PLIABLE BLB EZ TO GRP 1 HND USE W/

## (undated) DEVICE — CHLORAPREP 26ML ORANGE

## (undated) DEVICE — SHEET,DRAPE,53X77,STERILE: Brand: MEDLINE

## (undated) DEVICE — GLOVE ORTHO 8   MSG9480